# Patient Record
Sex: FEMALE | Race: WHITE | NOT HISPANIC OR LATINO | Employment: FULL TIME | URBAN - METROPOLITAN AREA
[De-identification: names, ages, dates, MRNs, and addresses within clinical notes are randomized per-mention and may not be internally consistent; named-entity substitution may affect disease eponyms.]

---

## 2017-08-18 ENCOUNTER — OFFICE VISIT (OUTPATIENT)
Dept: FAMILY MEDICINE | Facility: CLINIC | Age: 32
End: 2017-08-18
Payer: COMMERCIAL

## 2017-08-18 VITALS
TEMPERATURE: 98.4 F | BODY MASS INDEX: 31.65 KG/M2 | WEIGHT: 190 LBS | SYSTOLIC BLOOD PRESSURE: 120 MMHG | DIASTOLIC BLOOD PRESSURE: 70 MMHG | HEART RATE: 106 BPM | HEIGHT: 65 IN | OXYGEN SATURATION: 100 % | RESPIRATION RATE: 16 BRPM

## 2017-08-18 DIAGNOSIS — G89.4 CHRONIC PAIN SYNDROME: Primary | ICD-10-CM

## 2017-08-18 LAB
CRP SERPL-MCNC: <2.9 MG/L (ref 0–8)
ERYTHROCYTE [SEDIMENTATION RATE] IN BLOOD BY WESTERGREN METHOD: 9 MM/H (ref 0–20)

## 2017-08-18 PROCEDURE — 86618 LYME DISEASE ANTIBODY: CPT | Performed by: PHYSICIAN ASSISTANT

## 2017-08-18 PROCEDURE — 36415 COLL VENOUS BLD VENIPUNCTURE: CPT | Performed by: PHYSICIAN ASSISTANT

## 2017-08-18 PROCEDURE — 86140 C-REACTIVE PROTEIN: CPT | Performed by: PHYSICIAN ASSISTANT

## 2017-08-18 PROCEDURE — 85652 RBC SED RATE AUTOMATED: CPT | Performed by: PHYSICIAN ASSISTANT

## 2017-08-18 PROCEDURE — 99214 OFFICE O/P EST MOD 30 MIN: CPT | Performed by: PHYSICIAN ASSISTANT

## 2017-08-18 PROCEDURE — 86038 ANTINUCLEAR ANTIBODIES: CPT | Performed by: PHYSICIAN ASSISTANT

## 2017-08-18 PROCEDURE — 82306 VITAMIN D 25 HYDROXY: CPT | Performed by: PHYSICIAN ASSISTANT

## 2017-08-18 RX ORDER — DEXTROAMPHETAMINE SACCHARATE, AMPHETAMINE ASPARTATE, DEXTROAMPHETAMINE SULFATE AND AMPHETAMINE SULFATE 5; 5; 5; 5 MG/1; MG/1; MG/1; MG/1
20 TABLET ORAL 2 TIMES DAILY
Refills: 0 | COMMUNITY
Start: 2017-08-18 | End: 2020-07-27

## 2017-08-18 RX ORDER — DEXTROAMPHETAMINE SACCHARATE, AMPHETAMINE ASPARTATE MONOHYDRATE, DEXTROAMPHETAMINE SULFATE AND AMPHETAMINE SULFATE 5; 5; 5; 5 MG/1; MG/1; MG/1; MG/1
20 CAPSULE, EXTENDED RELEASE ORAL DAILY
Qty: 30 CAPSULE | Refills: 0 | COMMUNITY
Start: 2017-08-18 | End: 2019-04-22

## 2017-08-18 RX ORDER — CYCLOBENZAPRINE HCL 5 MG
5 TABLET ORAL 3 TIMES DAILY PRN
Qty: 30 TABLET | Refills: 1 | Status: SHIPPED | OUTPATIENT
Start: 2017-08-18 | End: 2018-02-05

## 2017-08-18 NOTE — PROGRESS NOTES
"  SUBJECTIVE:   Nel Durán is a 32 year old female who presents to clinic today for the following health issues:      Neck pain and multiple symptoms        Problem list and histories reviewed & adjusted, as indicated.  Additional history: 33 y/o new to me female here to discuss ome pain issues.  She has long term struggle with pain issues.  Started about 4 years ago with some LLQ and groin. She did end up having hysterectomy and further work up.  She did did work with pain clinic as well.  She states that she has had several other surgeries.  She last followed with GYN where her last surgery was for adhesions and had complications with some nerve damage.  She tried narcotics in the past, but she states she is hyper sensitive and they do not help.  Most of her pain symptoms tend to ebb and flow.  She has given gabapentin, but never took.  Lidocaine patch, no help.  Massage therapy.      She did work with GI and did have normal colonoscopy.      She has worked with chiropractors, PHYSICAL THERAPY, injection therapy with blocks.      When she gets flare, she feels very bloated.  But it does seem very random.    BP Readings from Last 3 Encounters:   08/18/17 120/70   05/19/15 118/82   01/20/15 125/84    Wt Readings from Last 3 Encounters:   08/18/17 190 lb (86.2 kg)   05/19/15 179 lb 11.2 oz (81.5 kg)   01/20/15 199 lb 11.2 oz (90.6 kg)                      Reviewed and updated as needed this visit by clinical staffTobacco  Allergies  Meds       Reviewed and updated as needed this visit by Provider         ROS:  Constitutional, HEENT, cardiovascular, pulmonary, gi and gu systems are negative, except as otherwise noted.      OBJECTIVE:   /70 (BP Location: Left arm, Cuff Size: Adult Regular)  Pulse 106  Temp 98.4  F (36.9  C) (Oral)  Resp 16  Ht 5' 5\" (1.651 m)  Wt 190 lb (86.2 kg)  LMP 03/09/2013  SpO2 100%  BMI 31.62 kg/m2  Body mass index is 31.62 kg/(m^2).  GENERAL: alert and no " distress  EYES: Eyes grossly normal to inspection  HENT: ear canals and TM's normal, nose and mouth without ulcers or lesions  NECK: no adenopathy, no asymmetry, masses, or scars and thyroid normal to palpation  RESP: lungs clear to auscultation - no rales, rhonchi or wheezes  CV: regular rate and rhythm, normal S1 S2, no S3 or S4, no murmur, click or rub, no peripheral edema and peripheral pulses strong  MS: no gross musculoskeletal defects noted, no edema  SKIN: no suspicious lesions or rashes  PSYCH: mentation appears normal, affect normal/bright    Diagnostic Test Results:  none     ASSESSMENT/PLAN:             1. Chronic pain syndrome  Complicated case from long history and unknown triggers.  Will trial low dose flexeril to see if helps symptoms.  Will start some inflammatory/auto immune work up.  We discussed some alternative approaches, and discussed elimination diet.  May trial gluten free.  - cyclobenzaprine (FLEXERIL) 5 MG tablet; Take 1 tablet (5 mg) by mouth 3 times daily as needed for muscle spasms  Dispense: 30 tablet; Refill: 1  - Antinuclear antibody screen by EIA  - ESR: Erythrocyte sedimentation rate  - CRP, inflammation  - Lyme Disease Aydee with reflex to WB Serum  - Vitamin D Deficiency        Jovanny Zayas PA-C  [facility]

## 2017-08-18 NOTE — MR AVS SNAPSHOT
"              After Visit Summary   8/18/2017    Nel Durán    MRN: [id number]           Patient Information     Date Of Birth          1985        Visit Information        Provider Department      8/18/2017 2:20 PM Jovanny Zayas PA-C [facility]        Today's Diagnoses     Chronic pain syndrome    -  1       Follow-ups after your visit        Who to contact     If you have questions or need follow up information about today's clinic visit or your schedule please contact [facility] directly at [phone].  Normal or non-critical lab and imaging results will be communicated to you by M[employer], letter or phone within 4 business days after the clinic has received the results. If you do not hear from us within 7 days, please contact the clinic through Mind[employer] or phone. If you have a critical or abnormal lab result, we will notify you by phone as soon as possible.  Submit refill requests through [employer] or call your pharmacy and they will forward the refill request to us. Please allow 3 business days for your refill to be completed.          Additional Information About Your Visit        MyChart Information     [employer] gives you secure access to your electronic health record. If you see a primary care provider, you can also send messages to your care team and make appointments. If you have questions, please call your primary care clinic.  If you do not have a primary care provider, please call [phone] and they will assist you.        Care EveryWhere ID     This is your Care EveryWhere ID. This could be used by other organizations to access your [facility] medical records  [id number]        Your Vitals Were     Pulse Temperature Respirations Height Last Period Pulse Oximetry    106 98.4  F (36.9  C) (Oral) 16 5' 5\" (1.651 m) 03/09/2013 100%    BMI (Body Mass Index)                   31.62 kg/m2            Blood Pressure from Last 3 Encounters:   08/18/17 120/70 "   05/19/15 118/82   01/20/15 125/84    Weight from Last 3 Encounters:   08/18/17 190 lb (86.2 kg)   05/19/15 179 lb 11.2 oz (81.5 kg)   01/20/15 199 lb 11.2 oz (90.6 kg)              We Performed the Following     Antinuclear antibody screen by EIA     CRP, inflammation     ESR: Erythrocyte sedimentation rate     Lyme Disease Aydee with reflex to WB Serum     Vitamin D Deficiency          Today's Medication Changes          These changes are accurate as of: 8/18/17  2:54 PM.  If you have any questions, ask your nurse or doctor.               Start taking these medicines.        Dose/Directions    cyclobenzaprine 5 MG tablet   Commonly known as:  FLEXERIL   Used for:  Chronic pain syndrome   Started by:  Jovanny Zayas PA-C        Dose:  5 mg   Take 1 tablet (5 mg) by mouth 3 times daily as needed for muscle spasms   Quantity:  30 tablet   Refills:  1            Where to get your medicines      These medications were sent to [facility] [address] [place] [address] & [address], [place] [address]     Phone:  [phone]     cyclobenzaprine 5 MG tablet                Primary Care Provider Office Phone # Fax #    Tammy Arzate [phone] [address] [address]        Equal Access to Services     CHARO RUEDA AH: Hadfederica kebedeo Somatthias, waaxda luqadaha, qaybta kaalmada heath, tian tanner . So [facility] [phone].    ATENCIÓN: Si habla español, tiene a maza disposición servicios gratuitos de asistencia lingüística. Nydia al [phone].    We comply with applicable federal civil rights laws and [place] laws. We do not discriminate on the basis of race, color, national origin, age, disability sex, sexual orientation or gender identity.            Thank you!     Thank you for choosing [facility]  for your care. Our goal is always to provide you with  excellent care. Hearing back from our patients is one way we can continue to improve our services. Please take a few minutes to complete the written survey that you may receive in the mail after your visit with us. Thank you!             Your Updated Medication List - Protect others around you: Learn how to safely use, store and throw away your medicines at www.disposemymeds.org.          This list is accurate as of: 8/18/17  2:54 PM.  Always use your most recent med list.                   Brand Name Dispense Instructions for use Diagnosis    * ADDERALL XR 20 MG per 24 hr capsule   Generic drug:  amphetamine-dextroamphetamine     30 capsule    Take 1 capsule (20 mg) by mouth daily        * ADDERALL 20 MG per tablet   Generic drug:  amphetamine-dextroamphetamine      Take 1 tablet (20 mg) by mouth 2 times daily        cetirizine 10 MG tablet    zyrTEC     Take 10 mg by mouth daily        cyclobenzaprine 5 MG tablet    FLEXERIL    30 tablet    Take 1 tablet (5 mg) by mouth 3 times daily as needed for muscle spasms    Chronic pain syndrome       MULTIVITAMINS PO      Take  by mouth daily.        * Notice:  This list has 2 medication(s) that are the same as other medications prescribed for you. Read the directions carefully, and ask your doctor or other care provider to review them with you.

## 2017-08-18 NOTE — NURSING NOTE
"Chief Complaint   Patient presents with     Neck Pain     initial /70 (BP Location: Left arm, Cuff Size: Adult Regular)  Pulse 106  Temp 98.4  F (36.9  C) (Oral)  Resp 16  Ht 5' 5\" (1.651 m)  Wt 190 lb (86.2 kg)  LMP 03/09/2013  SpO2 100%  BMI 31.62 kg/m2 Estimated body mass index is 31.62 kg/(m^2) as calculated from the following:    Height as of this encounter: 5' 5\" (1.651 m).    Weight as of this encounter: 190 lb (86.2 kg).  BP completed using cuff size: regular.  L  arm      Health Maintenance that is potentially due pending provider review:  NONE    n/a    Ulices Hernandez ma  "

## 2017-08-19 LAB — DEPRECATED CALCIDIOL+CALCIFEROL SERPL-MC: 34 UG/L (ref 20–75)

## 2017-08-21 LAB
ANA SER QL IA: <1
B BURGDOR IGG+IGM SER QL: 0.12 (ref 0–0.89)

## 2017-08-21 NOTE — PROGRESS NOTES
Dear Nel    Your test results are attached, feel free to contact me via Hashtagot     I have released your results so you can see as well.  Your vitamin D level is a little sub optimal, but I doubt this playing much role.  Have you started going gluten free today?    Cole Zayas PA-C

## 2017-08-27 ENCOUNTER — HEALTH MAINTENANCE LETTER (OUTPATIENT)
Age: 32
End: 2017-08-27

## 2018-02-05 ENCOUNTER — HOSPITAL ENCOUNTER (EMERGENCY)
Facility: CLINIC | Age: 33
Discharge: HOME | End: 2018-02-05
Attending: EMERGENCY MEDICINE | Admitting: EMERGENCY MEDICINE
Payer: COMMERCIAL

## 2018-02-05 ENCOUNTER — APPOINTMENT (OUTPATIENT)
Dept: ULTRASOUND IMAGING | Facility: CLINIC | Age: 33
End: 2018-02-05
Attending: EMERGENCY MEDICINE
Payer: COMMERCIAL

## 2018-02-05 ENCOUNTER — APPOINTMENT (OUTPATIENT)
Dept: CT IMAGING | Facility: CLINIC | Age: 33
End: 2018-02-05
Attending: EMERGENCY MEDICINE
Payer: COMMERCIAL

## 2018-02-05 VITALS
HEIGHT: 65 IN | DIASTOLIC BLOOD PRESSURE: 83 MMHG | SYSTOLIC BLOOD PRESSURE: 131 MMHG | OXYGEN SATURATION: 99 % | RESPIRATION RATE: 16 BRPM | BODY MASS INDEX: 29.16 KG/M2 | WEIGHT: 175 LBS | TEMPERATURE: 99.5 F

## 2018-02-05 DIAGNOSIS — N83.202 CYST OF LEFT OVARY: ICD-10-CM

## 2018-02-05 DIAGNOSIS — N28.9 RENAL LESION: ICD-10-CM

## 2018-02-05 DIAGNOSIS — J02.0 STREP THROAT: ICD-10-CM

## 2018-02-05 LAB
ALBUMIN SERPL-MCNC: 3.9 G/DL (ref 3.4–5)
ALBUMIN UR-MCNC: 30 MG/DL
ALP SERPL-CCNC: 46 U/L (ref 40–150)
ALT SERPL W P-5'-P-CCNC: 23 U/L (ref 0–50)
ANION GAP SERPL CALCULATED.3IONS-SCNC: 8 MMOL/L (ref 3–14)
APPEARANCE UR: CLEAR
AST SERPL W P-5'-P-CCNC: 16 U/L (ref 0–45)
BASOPHILS # BLD AUTO: 0 10E9/L (ref 0–0.2)
BASOPHILS NFR BLD AUTO: 0.2 %
BILIRUB SERPL-MCNC: 0.5 MG/DL (ref 0.2–1.3)
BILIRUB UR QL STRIP: NEGATIVE
BUN SERPL-MCNC: 8 MG/DL (ref 7–30)
CALCIUM SERPL-MCNC: 8.5 MG/DL (ref 8.5–10.1)
CHLORIDE SERPL-SCNC: 105 MMOL/L (ref 94–109)
CO2 SERPL-SCNC: 22 MMOL/L (ref 20–32)
COLOR UR AUTO: YELLOW
CREAT SERPL-MCNC: 0.71 MG/DL (ref 0.52–1.04)
DEPRECATED S PYO AG THROAT QL EIA: ABNORMAL
DIFFERENTIAL METHOD BLD: ABNORMAL
EOSINOPHIL # BLD AUTO: 0 10E9/L (ref 0–0.7)
EOSINOPHIL NFR BLD AUTO: 0 %
ERYTHROCYTE [DISTWIDTH] IN BLOOD BY AUTOMATED COUNT: 12 % (ref 10–15)
GFR SERPL CREATININE-BSD FRML MDRD: >90 ML/MIN/1.7M2
GLUCOSE SERPL-MCNC: 104 MG/DL (ref 70–99)
GLUCOSE UR STRIP-MCNC: NEGATIVE MG/DL
HCG UR QL: NEGATIVE
HCT VFR BLD AUTO: 39 % (ref 35–47)
HGB BLD-MCNC: 13.6 G/DL (ref 11.7–15.7)
HGB UR QL STRIP: ABNORMAL
IMM GRANULOCYTES # BLD: 0 10E9/L (ref 0–0.4)
IMM GRANULOCYTES NFR BLD: 0.3 %
KETONES UR STRIP-MCNC: 80 MG/DL
LEUKOCYTE ESTERASE UR QL STRIP: NEGATIVE
LYMPHOCYTES # BLD AUTO: 0.8 10E9/L (ref 0.8–5.3)
LYMPHOCYTES NFR BLD AUTO: 6.8 %
MCH RBC QN AUTO: 29.8 PG (ref 26.5–33)
MCHC RBC AUTO-ENTMCNC: 34.9 G/DL (ref 31.5–36.5)
MCV RBC AUTO: 86 FL (ref 78–100)
MONOCYTES # BLD AUTO: 0.7 10E9/L (ref 0–1.3)
MONOCYTES NFR BLD AUTO: 6.1 %
MUCOUS THREADS #/AREA URNS LPF: PRESENT /LPF
NEUTROPHILS # BLD AUTO: 10.1 10E9/L (ref 1.6–8.3)
NEUTROPHILS NFR BLD AUTO: 86.6 %
NITRATE UR QL: NEGATIVE
NRBC # BLD AUTO: 0 10*3/UL
NRBC BLD AUTO-RTO: 0 /100
PH UR STRIP: 5.5 PH (ref 5–7)
PLATELET # BLD AUTO: 122 10E9/L (ref 150–450)
POTASSIUM SERPL-SCNC: 3.5 MMOL/L (ref 3.4–5.3)
PROT SERPL-MCNC: 7.7 G/DL (ref 6.8–8.8)
RBC # BLD AUTO: 4.56 10E12/L (ref 3.8–5.2)
RBC #/AREA URNS AUTO: 1 /HPF (ref 0–2)
SODIUM SERPL-SCNC: 135 MMOL/L (ref 133–144)
SOURCE: ABNORMAL
SP GR UR STRIP: 1.02 (ref 1–1.03)
SPECIMEN SOURCE: ABNORMAL
SQUAMOUS #/AREA URNS AUTO: 2 /HPF (ref 0–1)
UROBILINOGEN UR STRIP-MCNC: NORMAL MG/DL (ref 0–2)
WBC # BLD AUTO: 11.7 10E9/L (ref 4–11)
WBC #/AREA URNS AUTO: 1 /HPF (ref 0–2)

## 2018-02-05 PROCEDURE — 81001 URINALYSIS AUTO W/SCOPE: CPT | Performed by: EMERGENCY MEDICINE

## 2018-02-05 PROCEDURE — 99285 EMERGENCY DEPT VISIT HI MDM: CPT | Mod: 25

## 2018-02-05 PROCEDURE — 96374 THER/PROPH/DIAG INJ IV PUSH: CPT | Mod: 59

## 2018-02-05 PROCEDURE — 93976 VASCULAR STUDY: CPT

## 2018-02-05 PROCEDURE — 85025 COMPLETE CBC W/AUTO DIFF WBC: CPT | Performed by: EMERGENCY MEDICINE

## 2018-02-05 PROCEDURE — 25000128 H RX IP 250 OP 636: Performed by: EMERGENCY MEDICINE

## 2018-02-05 PROCEDURE — 74177 CT ABD & PELVIS W/CONTRAST: CPT

## 2018-02-05 PROCEDURE — 80053 COMPREHEN METABOLIC PANEL: CPT | Performed by: EMERGENCY MEDICINE

## 2018-02-05 PROCEDURE — 25000125 ZZHC RX 250: Performed by: EMERGENCY MEDICINE

## 2018-02-05 PROCEDURE — 87880 STREP A ASSAY W/OPTIC: CPT | Performed by: EMERGENCY MEDICINE

## 2018-02-05 PROCEDURE — 81025 URINE PREGNANCY TEST: CPT | Performed by: EMERGENCY MEDICINE

## 2018-02-05 RX ORDER — IOPAMIDOL 755 MG/ML
88 INJECTION, SOLUTION INTRAVASCULAR ONCE
Status: COMPLETED | OUTPATIENT
Start: 2018-02-05 | End: 2018-02-05

## 2018-02-05 RX ORDER — KETOROLAC TROMETHAMINE 30 MG/ML
30 INJECTION, SOLUTION INTRAMUSCULAR; INTRAVENOUS ONCE
Status: COMPLETED | OUTPATIENT
Start: 2018-02-05 | End: 2018-02-05

## 2018-02-05 RX ORDER — AMOXICILLIN 500 MG/1
1000 CAPSULE ORAL 2 TIMES DAILY
Qty: 40 CAPSULE | Refills: 0 | Status: SHIPPED | OUTPATIENT
Start: 2018-02-05 | End: 2018-02-15

## 2018-02-05 RX ADMIN — SODIUM CHLORIDE 67 ML: 9 INJECTION, SOLUTION INTRAVENOUS at 13:22

## 2018-02-05 RX ADMIN — KETOROLAC TROMETHAMINE 30 MG: 30 INJECTION, SOLUTION INTRAMUSCULAR at 16:25

## 2018-02-05 RX ADMIN — IOPAMIDOL 88 ML: 755 INJECTION, SOLUTION INTRAVENOUS at 13:22

## 2018-02-05 ASSESSMENT — ENCOUNTER SYMPTOMS
SORE THROAT: 1
ABDOMINAL PAIN: 1
COUGH: 0
FEVER: 1
BLOOD IN STOOL: 1

## 2018-02-05 NOTE — ED PROVIDER NOTES
History     Chief Complaint:  Abdominal Pain       The history is provided by the patient.      Nel Durán is a 32 year old female with a history of chronic abdominal pain who presents to the ED for evaluation of abdominal pain. The patient has had LLQ abdominal pain and rectal bleeding since 02/01. In every bowel movement she has had since, she has noticed streaks of bright red blood in her stool and in the toilet. On 02/03, she developed a right sided sore throat with swelling and purulent drainage. Today, she noted an objective fever of 102F and decided to come in for evaluation.     Here in the ED, the patient denies having a cough or urinary changes. She reports a history of strep throat, a UTI in 2017, and colonoscopy from 3 years ago at the [facility]. Her colonoscopy was unremarkable, however, her pain decreased with a diet change.       Allergies:  No Known Drug Allergy    Medications:    The patient is currently on no regular medications.    Past Medical History:    ADD (attention deficit disorder)   ASCUS favor benign   depression   History of blood transfusion   Menarche   Pelvic pain     Past Surgical History:    Colonoscopy  Labial plasty  Hysterectomy supracervical  Lysis adhesions  Tubal ligation  Laparatomy exploratory    Family History:    Father: colon polyps  Mother: colon polyps    Social History:  Presents with her .  Negative for tobacco use.  Negative for alcohol use.  Marital Status:   [2]     Review of Systems   Constitutional: Positive for fever.   HENT: Positive for sore throat.    Respiratory: Negative for cough.    Gastrointestinal: Positive for abdominal pain and blood in stool.   All other systems reviewed and are negative.    Physical Exam   First Vitals:  Patient Vitals for the past 24 hrs:   BP Temp Temp src Heart Rate Resp SpO2 Height Weight   02/05/18 1630 131/83 - - - 16 99 % - -   02/05/18 1132 148/86 99.5  F (37.5  C) Oral 108 16 99 %  "1.651 m (5' 5\") 79.4 kg (175 lb)     Physical Exam  General: Resting on the gurney, appears uncomfortable  Head:  The scalp, face, and head appear normal  Mouth/Throat: Mucus membranes are moist. Slight tonsillar hydropathy, redness, and exudate of the posterior oral pharyxn, right worse than left.      No evidence of peritonsillar abscess or redness or swelling of the soft palat.   CV:  Regular rate    Normal S1 and S2  No pathological murmur   Resp:  Breath sounds clear and equal bilaterally    Non-labored, no retractions or accessory muscle use    No coarseness    No wheezing   GI:  Abdomen is soft, no rigidity    LLQ tenderness to palpation.    No guarding or rebound.    No CVA tenderness to percussion.  MS:  Normal motor assessment of all extremities.    Good capillary refill noted.  Skin:   No rash or lesions noted.  Neuro:   Speech is normal and fluent. No apparent deficit.  Psych: Awake. Alert.  Normal affect.      Appropriate interactions.    Emergency Department Course   Imaging:  Radiographic findings were communicated with the patient who voiced understanding of the findings.    CT Abdomen Pelvis with contrast:   IMPRESSION:   1. A left ovarian cystic lesion measures 3.1 cm. Pelvic ultrasound may  be helpful for further characterization.  2. Trace amount of nonspecific free fluid in the pelvis.  3.  Mild soft tissue density thickening involving the anterior  abdominal wall overlying the bladder just left of midline had a  similar appearance on the exam of 1/15/2015, and may represent  postoperative scarring. Please clinically correlate.  4. Indeterminate 1.6 cm hypoenhancing lesion in the lower pole of the  left kidney could represent a hemorrhagic or proteinaceous renal cyst,  however solid renal neoplasm cannot be excluded. Renal MRI should be  considered for further characterization.   As per radiology.    US Abdomen Pelvis Deplex Limited:   IMPRESSION:  1. Simple-appearing cyst in the left ovary " measuring 3 x 2.9 x 2.9 cm.  2. Status post hysterectomy. As per radiology.     Laboratory:  CBC: WBC: 11.7 (H), HGB: 13.6, PLT: 122 (L)  CMP: Glucose 104 (H), o/w WNL (Creatinine: 0.71)    UA with micro: blood trace (A), urineketon 80 (A), albumin 30 (A), squamous epithelial 2 (H), mucous present (A), o/w negative  HCG: Negative   Rapid strep: Positive     Interventions:  1625 Toradol 30 mg IV    Emergency Department Course:  Nursing notes and vitals reviewed. 1153 I performed an exam of the patient as documented above.     IV inserted. Medicine administered as documented above. Blood drawn. This was sent to the lab for further testing, results above.    The patient was sent for a CT Abdomen Pelvis and US Abdomen Pelvis Duplex while in the emergency department, findings above.     1447 I rechecked the patient and discussed the results of her workup thus far.     1619  I rechecked the patient and discussed the results of her workup thus far.  Findings and plan explained to the Patient. Patient discharged home with instructions regarding supportive care, medications, and reasons to return. The importance of close follow-up was reviewed. The patient was prescribed Amoxil.     I personally reviewed the laboratory results with the Patient and answered all related questions prior to discharge.     Impression & Plan    Medical Decision Making:  Nel Durán is a 32 year old female who presents with sore throat and clinical evidence of pharyngitis.  The rapid strep test is positive. There is no clinical evidence of  peritonsillar abscess, retropharyngeal abscess, Lemierre's Syndrome, epiglottis, or Rashawn's angina. The patient's symptoms are consistent with streptococcal pharyngitis.  I have recommended treatment with antibiotics and analgesics.     She additionally has LLQ pain pain.   A broad differential diagnosis was considered including colitis, appendicitis, intestinal cramping, pyelonephritis, UTI, kidney  stone, constipation, diverticulitis, volvulus, ileus, obstruction, pregnancy (ectopic or intrauterine), ovarian cyst (enlarged or ruptured), ovarian torsion, PID, etc as possibilities.   The workup in the ED shows likely ovarian cyst as source of pain. Doubt PID or TOA given clear findings of cyst without signs of abscess.  No other etiology for the patients pain is found at this point and my suspicion of an intraabdominal catastrophe or other worrisome etiology is very low. Patient is hemodynamically stable in ED.    Additionally, there was an incidental finding of a renal lesion. Patient was told to follow up with her primary care who will arrange a follow up with renal MRI.       Return for fevers greater than 102, increasing pain, other new symptoms develop.  Questions were answered.       Diagnosis:    ICD-10-CM    1. Strep throat J02.0    2. Cyst of left ovary N83.202    3. Renal lesion N28.9     -will need follow up with renal MRI - have your [facility] caredoctor arrange this       Disposition:  discharged to home    Discharge Medications:  New Prescriptions    AMOXICILLIN (AMOXIL) 500 MG CAPSULE    Take 2 capsules (1,000 mg) by mouth 2 times daily for 10 days     Rehana CUNHA, am serving as a scribe on 2/5/2018 at 11:53 AM to personally document services performed by Jessica Puckett MD based on my observations and the provider's statements to me.       Rehana Way  2/5/2018    EMERGENCY DEPARTMENT       Jessica Puckett MD  02/06/18 0218

## 2018-02-05 NOTE — DISCHARGE INSTRUCTIONS
Discharge Instructions  Sore Throat  You were seen today for a sore throat.  Most sore throats are caused by a virus. Antibiotics do not help with viral infections, but you can fight off the virus on your own.  In this case, your sore throat would be treated with medications for your pain and fever.    Strep throat is a kind of sore throat caused by Group A streptococcus bacteria.  This type of sore throat is treated with antibiotics.  If you had a rapid test done today for strep throat and it did not show infection, we always do a culture. If the culture shows you have strep throat, we will call you and get you a prescription for antibiotics.    Return to the Emergency Department if:    If you have difficulty breathing.    If you are drooling because you are unable to swallow.    You become dehydrated due to difficulty drinking. Signs of dehydration include weakness, dry mouth, and urinating less than 3 times per day.    If you develop swelling of the neck or tongue.    If you develop a high fever with either headache or stiff neck.    Treatment:      Pain relief -- Non-prescription pain medications, such as Tylenol  (acetaminophen) or Motrin , Advil  (ibuprofen) are usually recommended for pain.  Do not use a medicine that you are allergic to, or if your doctor has told you not to use it.   If you have been given a narcotic such as Vicodin  (hydrocodone with acetaminophen), Percocet  (oxycodone with acetaminophen), codeine, do not drive for four hours after you have taken it.  If the narcotic contains Tylenol  (acetaminophen), do not take Tylenol  with it. All narcotics will cause constipation, so eat a high fiber diet.      If you have been placed on antibiotics, watch for signs of allergic reaction.  These include rash, lip swelling, difficulty breathing, wheezing, and dizziness.  If you develop any of these symptoms, stop the antibiotic immediately and go to an Emergency Department or Urgent Care for  "evaluation.    Probiotics: If you have been given an antibiotic, you may want to also take a probiotic pill or eat yogurt with live cultures. Probiotics have \"good bacteria\" to help your intestines stay healthy. Studies have shown that probiotics help prevent diarrhea and other intestine problems (including C. diff infection) when you take antibiotics. You can buy these without a prescription in the pharmacy section of the store.   If you were given a prescription for medicine here today, be sure to read all of the information (including the package insert) that comes with your prescription.  This will include important information about the medicine, its side effects, and any warnings that you need to know about.  The pharmacist who fills the prescription can provide more information and answer questions you may have about the medicine.  If you have questions or concerns that the pharmacist cannot address, please call or return to the Emergency Department.           Opioid Medication Information    Pain medications are among the most commonly prescribed medicines, so we are including this information for all our patients. If you did not receive pain medication or get a prescription for pain medicine, you can ignore it.     You may have been given a prescription for an opioid (narcotic) pain medicine and/or have received a pain medicine while here in the Emergency Department. These medicines can make you drowsy or impaired. You must not drive, operate dangerous equipment, or engage in any other dangerous activities while taking these medications. If you drive while taking these medications, you could be arrested for DUI, or driving under the influence. Do not drink any alcohol while you are taking these medications.     Opioid pain medications can cause addiction. If you have a history of chemical dependency of any type, you are at a higher risk of becoming addicted to pain medications.  Only take these prescribed " medications to treat your pain when all other options have been tried. Take it for as short a time and as few doses as possible. Store your pain pills in a secure place, as they are frequently stolen and provide a dangerous opportunity for children or visitors in your house to start abusing these powerful medications. We will not replace any lost or stolen medicine.  As soon as your pain is better, you should flush all your remaining medication.     Many prescription pain medications contain Tylenol  (acetaminophen), including Vicodin , Tylenol #3 , Norco , Lortab , and Percocet .  You should not take any extra pills of Tylenol  if you are using these prescription medications or you can get very sick.  Do not ever take more than 3000 mg of acetaminophen in any 24 hour period.    All opioids tend to cause constipation. Drink plenty of water and eat foods that have a lot of fiber, such as fruits, vegetables, prune juice, apple juice and high fiber cereal.  Take a laxative if you don t move your bowels at least every other day. Miralax , Milk of Magnesia, Colace , or Senna  can be used to keep you regular.      Remember that you can always come back to the Emergency Department if you are not able to see your regular doctor in the amount of time listed above, if you get any new symptoms, or if there is anything that worries you.    Discharge Instructions  Ovarian Cyst    Abdominal pain can be caused by many things. Your doctor today has found that you have a cyst on the ovary, which appears to be the cause of your pain. Women in their reproductive years form cysts every month, but only cause pain if they are very large, or if they rupture and release blood or fluid. Fortunately, they rarely require surgery or hospitalization. The pain from a ruptured cyst usually gets gradually better, and should be much better within a few days. If there is a large cyst, it will usually go away within 1-2 months, but needs to be  watched to be sure it does go away, since sometimes a large cyst can become a cancer. There can be complications of a cyst, or other problems that cannot be found right away, so it is very important that you follow up as directed.      Return to the Emergency Department for a recheck if your pain gets worse, changes in location, or feels different.    Return to the Emergency Department right away if:    You get an oral temperature above 102oF or as directed by your doctor.    You have blood in your stools (bright red or black, tarry stools), or in your vomit.    You keep throwing up or can t drink liquids.    You can t have a bowel movement or you can t pass gas.    You faint, or feel very weak.    You have bloody, frequent or painful urination.    You have new symptoms or anything that worries you.    What can I do to help myself?    Take any medication prescribed by your doctor.    You may use Tylenol  (acetaminophen) or Advil , Motrin  (ibuprofen) for pain. Be sure to read and follow the package directions, and ask your doctor if you have questions.    Narcotic pain pills. If you have been given a narcotic such as Vicodin  (hydrocodone with acetaminophen), Percocet  (oxycodone with acetaminophen), codeine, do not drive for four hours after you have taken it.  If the narcotic contains Tylenol  (acetaminophen), do not take Tylenol  with it. All narcotics will cause constipation, so eat a high fiber diet.      Avoid sex for several days, because it will probably be painful.    Follow-up:      See your doctor within 2-3 days for a re-check.    If you were given a prescription for medicine here today, be sure to read all of the information (including the package insert) that comes with your prescription.  This will include important information about the medicine, its side effects, and any warnings that you need to know about.  The pharmacist who fills the prescription can provide more information and answer questions  you may have about the medicine.  If you have questions or concerns that the pharmacist cannot address, please call or return to the Emergency Department.         Opioid Medication Information    Pain medications are among the most commonly prescribed medicines, so we are including this information for all our patients. If you did not receive pain medication or get a prescription for pain medicine, you can ignore it.     You may have been given a prescription for an opioid (narcotic) pain medicine and/or have received a pain medicine while here in the Emergency Department. These medicines can make you drowsy or impaired. You must not drive, operate dangerous equipment, or engage in any other dangerous activities while taking these medications. If you drive while taking these medications, you could be arrested for DUI, or driving under the influence. Do not drink any alcohol while you are taking these medications.     Opioid pain medications can cause addiction. If you have a history of chemical dependency of any type, you are at a higher risk of becoming addicted to pain medications.  Only take these prescribed medications to treat your pain when all other options have been tried. Take it for as short a time and as few doses as possible. Store your pain pills in a secure place, as they are frequently stolen and provide a dangerous opportunity for children or visitors in your house to start abusing these powerful medications. We will not replace any lost or stolen medicine.  As soon as your pain is better, you should flush all your remaining medication.     Many prescription pain medications contain Tylenol  (acetaminophen), including Vicodin , Tylenol #3 , Norco , Lortab , and Percocet .  You should not take any extra pills of Tylenol  if you are using these prescription medications or you can get very sick.  Do not ever take more than 3000 mg of acetaminophen in any 24 hour period.    All opioids tend to cause  constipation. Drink plenty of water and eat foods that have a lot of fiber, such as fruits, vegetables, prune juice, apple juice and high fiber cereal.  Take a laxative if you don t move your bowels at least every other day. Miralax , Milk of Magnesia, Colace , or Senna  can be used to keep you regular.      Remember that you can always come back to the Emergency Department if you are not able to see your regular doctor in the amount of time listed above, if you get any new symptoms, or if there is anything that worries you.    Discharge Instructions  Incidental Findings    An incidental finding is something unexpected that was found while you were being treated today.  While this finding is not an emergency, you will want to follow up with your primary doctor to determine if anything should be done about it.    These findings can come from:    Checking your vital signs (example: high blood pressure).    Taking your history (example: unexplained weight loss).    The physical exam (example: a heart murmur).    Laboratory study (example: anemia).    X-rays/ultrasound/CT or other imaging (example: an unexplained mass).    Return to the Emergency Department if:    Your condition worsens.    You develop unexpected pain.    You now develop new symptoms or have new concerns.    Follow up with your doctor:    Follow up within the next week to discuss the results of all of your tests and about the main reason for your visit today.    Inform your doctor of what testing you had done today. You may want to obtain copies of your results from the medical records department.    Modern medical technology has become very sensitive.  Unexpected or incidental findings are very common and do not always indicate a problem.  However, sometimes problems are found very early before symptoms have even started. While these findings are not an emergency, this may allow your primary care doctor to start the earliest treatment possible.  Sometimes these incidental findings are not discovered until later when the final report is reported or when your primary care doctor compares our finding to your previous studies. Therefore, it is important for you to always review all results and studies with your primary care doctor or clinic after an Emergency Department visit.  If you were given a prescription for medicine here today, be sure to read all of the information (including the package insert) that comes with your prescription.  This will include important information about the medicine, its side effects, and any warnings that you need to know about.  The pharmacist who fills the prescription can provide more information and answer questions you may have about the medicine.  If you have questions or concerns that the pharmacist cannot address, please call or return to the Emergency Department.   Remember that you can always come back to the Emergency Department if you are not able to see your regular doctor in the amount of time listed above, if you get any new symptoms, or if there is anything that worries you.      You need to follow up with your primary to have your kidney reevaluated

## 2018-02-05 NOTE — ED AVS SNAPSHOT
Emergency Department    [address] [facility] [address]    Phone:  [phone]    Fax:  [phone]                                       Nel Durán   MRN: [id number]    Department:   Emergency Department   Date of Visit:  2/5/2018           After Visit Summary Signature Page     I have received my discharge instructions, and my questions have been answered. I have discussed any challenges I see with this plan with the nurse or doctor.    ..........................................................................................................................................  Patient/Patient Representative Signature      ..........................................................................................................................................  Patient Representative Print Name and Relationship to Patient    ..................................................               ................................................  Date                                            Time    ..........................................................................................................................................  Reviewed by Signature/Title    ...................................................              ..............................................  Date                                                            Time

## 2018-02-05 NOTE — ED AVS SNAPSHOT
Emergency Department    [address] [facility] [address]    Phone:  [phone]    Fax:  [phone]                                       Nel Durán   MRN: [id number]    Department:   Emergency Department   Date of Visit:  2/5/2018           Patient Information     Date Of Birth          1985        Your diagnoses for this visit were:     Strep throat     Cyst of left ovary     Renal lesion -will need follow up with renal MRI - have your priamry caredoctor arrange this       You were seen by Jessica Puckett MD.      Follow-up Information     Follow up with [facility]. Schedule an appointment as soon as possible for a visit in 2 days.    Contact information:    [address], [address]  [facility] [address] [phone]          Follow up with  Emergency Department.    Specialty:  EMERGENCY MEDICINE    Why:  As needed, If symptoms worsen    Contact information:    [address] [facility] [address] [phone]        Discharge Instructions       Discharge Instructions  Sore Throat  You were seen today for a sore throat.  Most sore throats are caused by a virus. Antibiotics do not help with viral infections, but you can fight off the virus on your own.  In this case, your sore throat would be treated with medications for your pain and fever.    Strep throat is a kind of sore throat caused by Group A streptococcus bacteria.  This type of sore throat is treated with antibiotics.  If you had a rapid test done today for strep throat and it did not show infection, we always do a culture. If the culture shows you have strep throat, we will call you and get you a prescription for antibiotics.    Return to the Emergency Department if:    If you have difficulty breathing.    If you are drooling because you are unable to swallow.    You become dehydrated due to difficulty drinking. Signs of dehydration include weakness, dry mouth, and urinating less  "than 3 times per day.    If you develop swelling of the neck or tongue.    If you develop a high fever with either headache or stiff neck.    Treatment:      Pain relief -- Non-prescription pain medications, such as Tylenol  (acetaminophen) or Motrin , Advil  (ibuprofen) are usually recommended for pain.  Do not use a medicine that you are allergic to, or if your doctor has told you not to use it.   If you have been given a narcotic such as Vicodin  (hydrocodone with acetaminophen), Percocet  (oxycodone with acetaminophen), codeine, do not drive for four hours after you have taken it.  If the narcotic contains Tylenol  (acetaminophen), do not take Tylenol  with it. All narcotics will cause constipation, so eat a high fiber diet.      If you have been placed on antibiotics, watch for signs of allergic reaction.  These include rash, lip swelling, difficulty breathing, wheezing, and dizziness.  If you develop any of these symptoms, stop the antibiotic immediately and go to an Emergency Department or Urgent Care for evaluation.    Probiotics: If you have been given an antibiotic, you may want to also take a probiotic pill or eat yogurt with live cultures. Probiotics have \"good bacteria\" to help your intestines stay healthy. Studies have shown that probiotics help prevent diarrhea and other intestine problems (including C. diff infection) when you take antibiotics. You can buy these without a prescription in the pharmacy section of the store.   If you were given a prescription for medicine here today, be sure to read all of the information (including the package insert) that comes with your prescription.  This will include important information about the medicine, its side effects, and any warnings that you need to know about.  The pharmacist who fills the prescription can provide more information and answer questions you may have about the medicine.  If you have questions or concerns that the pharmacist cannot address, " please call or return to the Emergency Department.           Opioid Medication Information    Pain medications are among the most commonly prescribed medicines, so we are including this information for all our patients. If you did not receive pain medication or get a prescription for pain medicine, you can ignore it.     You may have been given a prescription for an opioid (narcotic) pain medicine and/or have received a pain medicine while here in the Emergency Department. These medicines can make you drowsy or impaired. You must not drive, operate dangerous equipment, or engage in any other dangerous activities while taking these medications. If you drive while taking these medications, you could be arrested for DUI, or driving under the influence. Do not drink any alcohol while you are taking these medications.     Opioid pain medications can cause addiction. If you have a history of chemical dependency of any type, you are at a higher risk of becoming addicted to pain medications.  Only take these prescribed medications to treat your pain when all other options have been tried. Take it for as short a time and as few doses as possible. Store your pain pills in a secure place, as they are frequently stolen and provide a dangerous opportunity for children or visitors in your house to start abusing these powerful medications. We will not replace any lost or stolen medicine.  As soon as your pain is better, you should flush all your remaining medication.     Many prescription pain medications contain Tylenol  (acetaminophen), including Vicodin , Tylenol #3 , Norco , Lortab , and Percocet .  You should not take any extra pills of Tylenol  if you are using these prescription medications or you can get very sick.  Do not ever take more than 3000 mg of acetaminophen in any 24 hour period.    All opioids tend to cause constipation. Drink plenty of water and eat foods that have a lot of fiber, such as fruits, vegetables,  prune juice, apple juice and high fiber cereal.  Take a laxative if you don t move your bowels at least every other day. Miralax , Milk of Magnesia, Colace , or Senna  can be used to keep you regular.      Remember that you can always come back to the Emergency Department if you are not able to see your regular doctor in the amount of time listed above, if you get any new symptoms, or if there is anything that worries you.    Discharge Instructions  Ovarian Cyst    Abdominal pain can be caused by many things. Your doctor today has found that you have a cyst on the ovary, which appears to be the cause of your pain. Women in their reproductive years form cysts every month, but only cause pain if they are very large, or if they rupture and release blood or fluid. Fortunately, they rarely require surgery or hospitalization. The pain from a ruptured cyst usually gets gradually better, and should be much better within a few days. If there is a large cyst, it will usually go away within 1-2 months, but needs to be watched to be sure it does go away, since sometimes a large cyst can become a cancer. There can be complications of a cyst, or other problems that cannot be found right away, so it is very important that you follow up as directed.      Return to the Emergency Department for a recheck if your pain gets worse, changes in location, or feels different.    Return to the Emergency Department right away if:    You get an oral temperature above 102oF or as directed by your doctor.    You have blood in your stools (bright red or black, tarry stools), or in your vomit.    You keep throwing up or can t drink liquids.    You can t have a bowel movement or you can t pass gas.    You faint, or feel very weak.    You have bloody, frequent or painful urination.    You have new symptoms or anything that worries you.    What can I do to help myself?    Take any medication prescribed by your doctor.    You may use Tylenol   (acetaminophen) or Advil , Motrin  (ibuprofen) for pain. Be sure to read and follow the package directions, and ask your doctor if you have questions.    Narcotic pain pills. If you have been given a narcotic such as Vicodin  (hydrocodone with acetaminophen), Percocet  (oxycodone with acetaminophen), codeine, do not drive for four hours after you have taken it.  If the narcotic contains Tylenol  (acetaminophen), do not take Tylenol  with it. All narcotics will cause constipation, so eat a high fiber diet.      Avoid sex for several days, because it will probably be painful.    Follow-up:      See your doctor within 2-3 days for a re-check.    If you were given a prescription for medicine here today, be sure to read all of the information (including the package insert) that comes with your prescription.  This will include important information about the medicine, its side effects, and any warnings that you need to know about.  The pharmacist who fills the prescription can provide more information and answer questions you may have about the medicine.  If you have questions or concerns that the pharmacist cannot address, please call or return to the Emergency Department.         Opioid Medication Information    Pain medications are among the most commonly prescribed medicines, so we are including this information for all our patients. If you did not receive pain medication or get a prescription for pain medicine, you can ignore it.     You may have been given a prescription for an opioid (narcotic) pain medicine and/or have received a pain medicine while here in the Emergency Department. These medicines can make you drowsy or impaired. You must not drive, operate dangerous equipment, or engage in any other dangerous activities while taking these medications. If you drive while taking these medications, you could be arrested for DUI, or driving under the influence. Do not drink any alcohol while you are taking these  medications.     Opioid pain medications can cause addiction. If you have a history of chemical dependency of any type, you are at a higher risk of becoming addicted to pain medications.  Only take these prescribed medications to treat your pain when all other options have been tried. Take it for as short a time and as few doses as possible. Store your pain pills in a secure place, as they are frequently stolen and provide a dangerous opportunity for children or visitors in your house to start abusing these powerful medications. We will not replace any lost or stolen medicine.  As soon as your pain is better, you should flush all your remaining medication.     Many prescription pain medications contain Tylenol  (acetaminophen), including Vicodin , Tylenol #3 , Norco , Lortab , and Percocet .  You should not take any extra pills of Tylenol  if you are using these prescription medications or you can get very sick.  Do not ever take more than 3000 mg of acetaminophen in any 24 hour period.    All opioids tend to cause constipation. Drink plenty of water and eat foods that have a lot of fiber, such as fruits, vegetables, prune juice, apple juice and high fiber cereal.  Take a laxative if you don t move your bowels at least every other day. Miralax , Milk of Magnesia, Colace , or Senna  can be used to keep you regular.      Remember that you can always come back to the Emergency Department if you are not able to see your regular doctor in the amount of time listed above, if you get any new symptoms, or if there is anything that worries you.    Discharge Instructions  Incidental Findings    An incidental finding is something unexpected that was found while you were being treated today.  While this finding is not an emergency, you will want to follow up with your primary doctor to determine if anything should be done about it.    These findings can come from:    Checking your vital signs (example: high blood  pressure).    Taking your history (example: unexplained weight loss).    The physical exam (example: a heart murmur).    Laboratory study (example: anemia).    X-rays/ultrasound/CT or other imaging (example: an unexplained mass).    Return to the Emergency Department if:    Your condition worsens.    You develop unexpected pain.    You now develop new symptoms or have new concerns.    Follow up with your doctor:    Follow up within the next week to discuss the results of all of your tests and about the main reason for your visit today.    Inform your doctor of what testing you had done today. You may want to obtain copies of your results from the medical records department.    Modern medical technology has become very sensitive.  Unexpected or incidental findings are very common and do not always indicate a problem.  However, sometimes problems are found very early before symptoms have even started. While these findings are not an emergency, this may allow your primary care doctor to start the earliest treatment possible. Sometimes these incidental findings are not discovered until later when the final report is reported or when your primary care doctor compares our finding to your previous studies. Therefore, it is important for you to always review all results and studies with your primary care doctor or clinic after an Emergency Department visit.  If you were given a prescription for medicine here today, be sure to read all of the information (including the package insert) that comes with your prescription.  This will include important information about the medicine, its side effects, and any warnings that you need to know about.  The pharmacist who fills the prescription can provide more information and answer questions you may have about the medicine.  If you have questions or concerns that the pharmacist cannot address, please call or return to the Emergency Department.   Remember that you can always come back  to the Emergency Department if you are not able to see your regular doctor in the amount of time listed above, if you get any new symptoms, or if there is anything that worries you.      You need to follow up with your primary to have your kidney reevaluated    Future Appointments        Provider Department Dept Phone Center    2/8/2018 8:20 AM Jovanny Zayas PA-C [facility] [phone] UP      24 Hour Appointment Hotline       To make an appointment at any [facility], call [phone] ([phone]). If you don't have a family doctor or clinic, we will help you find one. [facility] are conveniently located to serve the needs of you and your family.             Review of your medicines      START taking        Dose / Directions Last dose taken    amoxicillin 500 MG capsule   Commonly known as:  AMOXIL   Dose:  1000 mg   Quantity:  40 capsule        Take 2 capsules (1,000 mg) by mouth 2 times daily for 10 days   Refills:  0          Our records show that you are taking the medicines listed below. If these are incorrect, please call your family doctor or clinic.        Dose / Directions Last dose taken    * ADDERALL XR 20 MG per 24 hr capsule   Dose:  20 mg   Quantity:  30 capsule   Generic drug:  amphetamine-dextroamphetamine        Take 1 capsule (20 mg) by mouth daily   Refills:  0        * ADDERALL 20 MG per tablet   Dose:  20 mg   Generic drug:  amphetamine-dextroamphetamine        Take 1 tablet (20 mg) by mouth 2 times daily   Refills:  0        B COMPLEX + C TR PO        Refills:  0        FISH OIL PO        Refills:  0        * Notice:  This list has 2 medication(s) that are the same as other medications prescribed for you. Read the directions carefully, and ask your doctor or other care provider to review them with you.            Prescriptions were sent or printed at these locations (1 Prescription)                   Other Prescriptions                Printed at  Department/Unit printer (1 of 1)         amoxicillin (AMOXIL) 500 MG capsule                Procedures and tests performed during your visit     CBC with platelets differential    CT Abdomen Pelvis w Contrast    Comprehensive metabolic panel    Give 20 ounces of water 15 minutes before CT of abdomen    HCG qualitative urine    Rapid strep screen    UA with Microscopic reflex to Culture    US Abdomen Pelvis Duplex Limited      Orders Needing Specimen Collection     None      Pending Results     Date and Time Order Name Status Description    2/5/2018 1406 US Abdomen Pelvis Duplex Limited Preliminary             Pending Culture Results     No orders found from 2/3/2018 to 2/6/2018.            Pending Results Instructions     If you had any lab results that were not finalized at the time of your Discharge, you can call the ED Lab Result RN at [phone]. You will be contacted by this team for any positive Lab results or changes in treatment. The nurses are available 7 days a week from 10A to 6:30P.  You can leave a message 24 hours per day and they will return your call.        Test Results From Your Hospital Stay        2/5/2018 12:40 PM      Component Results     Component Value Ref Range & Units Status    WBC 11.7 (H) 4.0 - 11.0 10e9/L Final    RBC Count 4.56 3.8 - 5.2 10e12/L Final    Hemoglobin 13.6 11.7 - 15.7 g/dL Final    Hematocrit 39.0 35.0 - 47.0 % Final    MCV 86 78 - 100 fl Final    MCH 29.8 26.5 - 33.0 pg Final    MCHC 34.9 31.5 - 36.5 g/dL Final    RDW 12.0 10.0 - 15.0 % Final    Platelet Count 122 (L) 150 - 450 10e9/L Final    Diff Method Automated Method  Final    % Neutrophils 86.6 % Final    % Lymphocytes 6.8 % Final    % Monocytes 6.1 % Final    % Eosinophils 0.0 % Final    % Basophils 0.2 % Final    % Immature Granulocytes 0.3 % Final    Nucleated RBCs 0 0 /100 Final    Absolute Neutrophil 10.1 (H) 1.6 - 8.3 10e9/L Final    Absolute Lymphocytes 0.8 0.8 - 5.3 10e9/L Final    Absolute Monocytes 0.7  0.0 - 1.3 10e9/L Final    Absolute Eosinophils 0.0 0.0 - 0.7 10e9/L Final    Absolute Basophils 0.0 0.0 - 0.2 10e9/L Final    Abs Immature Granulocytes 0.0 0 - 0.4 10e9/L Final    Absolute Nucleated RBC 0.0  Final         2/5/2018 12:54 PM      Component Results     Component Value Ref Range & Units Status    Sodium 135 133 - 144 mmol/L Final    Potassium 3.5 3.4 - 5.3 mmol/L Final    Chloride 105 94 - 109 mmol/L Final    Carbon Dioxide 22 20 - 32 mmol/L Final    Anion Gap 8 3 - 14 mmol/L Final    Glucose 104 (H) 70 - 99 mg/dL Final    Urea Nitrogen 8 7 - 30 mg/dL Final    Creatinine 0.71 0.52 - 1.04 mg/dL Final    GFR Estimate >90 >60 mL/min/1.7m2 Final    Non  GFR Calc    GFR Estimate If Black >90 >60 mL/min/1.7m2 Final    African American GFR Calc    Calcium 8.5 8.5 - 10.1 mg/dL Final    Bilirubin Total 0.5 0.2 - 1.3 mg/dL Final    Albumin 3.9 3.4 - 5.0 g/dL Final    Protein Total 7.7 6.8 - 8.8 g/dL Final    Alkaline Phosphatase 46 40 - 150 U/L Final    ALT 23 0 - 50 U/L Final    AST 16 0 - 45 U/L Final         2/5/2018 12:49 PM      Component Results     Component Value Ref Range & Units Status    Color Urine Yellow  Final    Appearance Urine Clear  Final    Glucose Urine Negative NEG^Negative mg/dL Final    Bilirubin Urine Negative NEG^Negative Final    Ketones Urine 80 (A) NEG^Negative mg/dL Final    Specific Gravity Urine 1.022 1.003 - 1.035 Final    Blood Urine Trace (A) NEG^Negative Final    pH Urine 5.5 5.0 - 7.0 pH Final    Protein Albumin Urine 30 (A) NEG^Negative mg/dL Final    Urobilinogen mg/dL Normal 0.0 - 2.0 mg/dL Final    Nitrite Urine Negative NEG^Negative Final    Leukocyte Esterase Urine Negative NEG^Negative Final    Source Midstream Urine  Final    WBC Urine 1 0 - 2 /HPF Final    RBC Urine 1 0 - 2 /HPF Final    Squamous Epithelial /HPF Urine 2 (H) 0 - 1 /HPF Final    Mucous Urine Present (A) NEG^Negative /LPF Final         2/5/2018 12:42 PM      Component Results     Component  Value Ref Range & Units Status    HCG Qual Urine Negative NEG^Negative Final    This test is for screening purposes.  Results should be interpreted along with   the clinical picture.  Confirmation testing is available if warranted by   ordering [id number], HCG Quantitative Pregnancy.           2/5/2018 12:51 PM      Component Results     Component    Specimen Description    Throat    Rapid Strep A Screen (Abnormal)    POSITIVE: Group A Streptococcal antigen detected by immunoassay.         2/5/2018  2:48 PM      Narrative     CT ABDOMEN AND PELVIS WITH CONTRAST   2/5/2018 1:25 PM     HISTORY: Left lower quadrant pain.    TECHNIQUE: 88 mL Isovue-370 IV were administered. After contrast  administration, volumetric helical sections were acquired from the  lung bases to the ischial tuberosities. Coronal images were also  reconstructed. Radiation dose for this scan was reduced using  automated exposure control, adjustment of the mA and/or kV according  to patient size, or iterative reconstruction technique.    COMPARISON: CT of the abdomen and pelvis performed 1/15/2015.     FINDINGS: No bowel obstruction. No evidence for colitis or  diverticulitis. The appendix is partially seen, and appears  unremarkable where visualized. The uterus is not visualized,  consistent with history of prior supracervical hysterectomy. A left  ovarian cystic lesion measures 3.1 cm (series 2 image 76). There is a  trace amount of nonspecific free fluid in the pelvis mild soft tissue  density thickening in the anterior abdominal wall overlying the pelvis  just left of midline (series 2 image 80) has a similar appearance to  the previous exam, and may represent postoperative scarring.  Indeterminate hypoenhancing lesion in the lower pole of the left  kidney posterolaterally (series 2 image 39) measures 1.6 cm. This  lesion was not visible on the previous examination. The liver,  gallbladder, spleen, adrenal glands, pancreas, and kidneys  are  otherwise unremarkable. No hydronephrosis.  The visualized lung bases  are clear.          Impression     IMPRESSION:   1. A left ovarian cystic lesion measures 3.1 cm. Pelvic ultrasound may  be helpful for further characterization.  2. Trace amount of nonspecific free fluid in the pelvis.  3.  Mild soft tissue density thickening involving the anterior  abdominal wall overlying the bladder just left of midline had a  similar appearance on the exam of 1/15/2015, and may represent  postoperative scarring. Please clinically correlate.  4. Indeterminate 1.6 cm hypoenhancing lesion in the lower pole of the  left kidney could represent a hemorrhagic or proteinaceous renal cyst,  however solid renal neoplasm cannot be excluded. Renal MRI should be  considered for further characterization.      JET MELISSA MD               2/5/2018  4:08 PM      Narrative     ULTRASOUND ABDOMEN OR PELVIS DOPPLER LIMITED  2/5/2018 3:03 PM    HISTORY:  32-year-old woman with pelvic pain.    COMPARISON: No previous ultrasound exam, though a CT exam was  reviewed, performed earlier the same day on February 5, 2018.    TECHNIQUE: Transabdominal images were obtained. Patient refused  transvaginal imaging. Patient had hysterectomy in 2013. CT exam  demonstrated cystic collection in/adjacent to left ovary.    FINDINGS: The uterus is not identified. The right ovary is 3.5 x 2.6 x  2.4 cm. The left ovary is 5.1 x 3.3 x 4.1 cm. Hypoechoic collection is  noted within the left ovary measuring 3 x 2.9 x 2.9 cm. No internal  blood flow. This collection appears relatively anechoic and without  septations. No free fluid identified. Arterial and venous blood flow  is visible and identified in both ovaries.        Impression     IMPRESSION:  1. Simple-appearing cyst in the left ovary measuring 3 x 2.9 x 2.9 cm.  2. Status post hysterectomy.                Clinical Quality Measure: Blood Pressure Screening     Your blood pressure was checked while  you were in the emergency department today. The last reading we obtained was  BP: 148/86 . Please read the guidelines below about what these numbers mean and what you should do about them.  If your systolic blood pressure (the top number) is less than 120 and your diastolic blood pressure (the bottom number) is less than 80, then your blood pressure is normal. There is nothing more that you need to do about it.  If your systolic blood pressure (the top number) is 120-139 or your diastolic blood pressure (the bottom number) is 80-89, your blood pressure may be higher than it should be. You should have your blood pressure rechecked within a year by a primary care provider.  If your systolic blood pressure (the top number) is 140 or greater or your diastolic blood pressure (the bottom number) is 90 or greater, you may have high blood pressure. High blood pressure is treatable, but if left untreated over time it can put you at risk for heart attack, stroke, or kidney failure. You should have your blood pressure rechecked by a primary care provider within the next 4 weeks.  If your provider in the emergency department today gave you specific instructions to follow-up with your doctor or provider even sooner than that, you should follow that instruction and not wait for up to 4 weeks for your follow-up visit.        Thank you for choosing [place]       Thank you for choosing [facility] for your care. Our goal is always to provide you with excellent care. Hearing back from our patients is one way we can continue to improve our services. Please take a few minutes to complete the written survey that you may receive in the mail after you visit with us. Thank you!        [employer]W [employer] Information     [employer] gives you secure access to your electronic health record. If you see a primary care provider, you can also send messages to your care team and make appointments. If you have questions, please call your primary care clinic.  If  you do not have a primary care provider, please call [phone] and they will assist you.        Care EveryWhere ID     This is your Care EveryWhere ID. This could be used by other organizations to access your [place] medical records  [id number]        Equal Access to Services     KARMEN RUEDA : Tessa Maxwell, colton chambers, ileana kaalrichy joe, tian nevarez. So [facility] [phone].    ATENCIÓN: Si habla español, tiene a maza disposición servicios gratuitos de asistencia lingüística. Llame al [phone].    We comply with applicable federal civil rights laws and [place] laws. We do not discriminate on the basis of race, color, national origin, age, disability, sex, sexual orientation, or gender identity.            After Visit Summary       This is your record. Keep this with you and show to your community pharmacist(s) and doctor(s) at your next visit.

## 2018-02-22 ENCOUNTER — OFFICE VISIT (OUTPATIENT)
Dept: FAMILY MEDICINE | Facility: CLINIC | Age: 33
End: 2018-02-22
Payer: COMMERCIAL

## 2018-02-22 VITALS
OXYGEN SATURATION: 99 % | RESPIRATION RATE: 16 BRPM | TEMPERATURE: 98.7 F | HEART RATE: 66 BPM | WEIGHT: 179 LBS | SYSTOLIC BLOOD PRESSURE: 124 MMHG | BODY MASS INDEX: 29.79 KG/M2 | DIASTOLIC BLOOD PRESSURE: 86 MMHG

## 2018-02-22 DIAGNOSIS — N28.9 RENAL LESION: Primary | ICD-10-CM

## 2018-02-22 DIAGNOSIS — N83.202 CYST OF LEFT OVARY: ICD-10-CM

## 2018-02-22 PROCEDURE — 99213 OFFICE O/P EST LOW 20 MIN: CPT | Performed by: PHYSICIAN ASSISTANT

## 2018-02-22 ASSESSMENT — ANXIETY QUESTIONNAIRES
7. FEELING AFRAID AS IF SOMETHING AWFUL MIGHT HAPPEN: NOT AT ALL
6. BECOMING EASILY ANNOYED OR IRRITABLE: SEVERAL DAYS
IF YOU CHECKED OFF ANY PROBLEMS ON THIS QUESTIONNAIRE, HOW DIFFICULT HAVE THESE PROBLEMS MADE IT FOR YOU TO DO YOUR WORK, TAKE CARE OF THINGS AT HOME, OR GET ALONG WITH OTHER PEOPLE: NOT DIFFICULT AT ALL
5. BEING SO RESTLESS THAT IT IS HARD TO SIT STILL: NOT AT ALL
2. NOT BEING ABLE TO STOP OR CONTROL WORRYING: NOT AT ALL
1. FEELING NERVOUS, ANXIOUS, OR ON EDGE: SEVERAL DAYS
GAD7 TOTAL SCORE: 3
3. WORRYING TOO MUCH ABOUT DIFFERENT THINGS: NOT AT ALL

## 2018-02-22 ASSESSMENT — PATIENT HEALTH QUESTIONNAIRE - PHQ9: 5. POOR APPETITE OR OVEREATING: SEVERAL DAYS

## 2018-02-22 NOTE — PROGRESS NOTES
SUBJECTIVE:   Nel Durán is a 32 year old female who presents to clinic today for the following health issues:      ED/UC Followup:     Facility:  [facility]   Date of visit: 2/5/2018  Reason for visit: Sore throat, abdominal pain, and rectal bleeding  Current Status: pt states that she feels fine             Problem list and histories reviewed & adjusted, as indicated.  Additional history: patient was recently in the ER on 2/5 secondary to lower abdomen pain that was assumed to be from ovarian cyst.  She has had these in the past, but this one was worse.  She was also was dx and treated for strep.  She is feeling better from both of those.  During her work up, a CT was performed, and a possible renal lesion was seen, which recommendation for MRI follow up.    BP Readings from Last 3 Encounters:   02/22/18 124/86   02/05/18 131/83   08/18/17 120/70    Wt Readings from Last 3 Encounters:   02/22/18 179 lb (81.2 kg)   02/05/18 175 lb (79.4 kg)   08/18/17 190 lb (86.2 kg)                    Reviewed and updated as needed this visit by clinical staff  Tobacco  Allergies  Meds  Med Hx  Surg Hx  Fam Hx  Soc Hx      Reviewed and updated as needed this visit by Provider         ROS:  Constitutional, HEENT, cardiovascular, pulmonary, gi and gu systems are negative, except as otherwise noted.    OBJECTIVE:     /86  Pulse 66  Temp 98.7  F (37.1  C) (Tympanic)  Resp 16  Wt 179 lb (81.2 kg)  LMP 03/09/2013  SpO2 99%  BMI 29.79 kg/m2  Body mass index is 29.79 kg/(m^2).  GENERAL: alert and no distress  EYES: Eyes grossly normal to inspection  RESP: lungs clear to auscultation - no rales, rhonchi or wheezes  CV: regular rate and rhythm, normal S1 S2, no S3 or S4, no murmur, click or rub, no peripheral edema and peripheral pulses strong  PSYCH: mentation appears normal, affect normal/bright    Diagnostic Test Results:  none     ASSESSMENT/PLAN:             1. Renal lesion  Will get follow up  image as recommended by radiology.    - MR Abdomen w/o & w Contrast; Future    2. Cyst of left ovary  improved          Jovanny Zayas PA-C  [facility]

## 2018-02-22 NOTE — MR AVS SNAPSHOT
After Visit Summary   2/22/2018    Nel Durán    MRN: [id number]           Patient Information     Date Of Birth          1985        Visit Information        Provider Department      2/22/2018 2:00 PM Jovanny Zayas PA-C [facility]        Today's Diagnoses     Renal lesion    -  1    Cyst of left ovary           Follow-ups after your visit        Future tests that were ordered for you today     Open Future Orders        Priority Expected Expires Ordered    MR Abdomen w/o & w Contrast Routine  2/22/2019 2/22/2018            Who to contact     If you have questions or need follow up information about today's clinic visit or your schedule please contact [facility] directly at [phone].  Normal or non-critical lab and imaging results will be communicated to you by Bro[employer]-e-gameshart, letter or phone within 4 business days after the clinic has received the results. If you do not hear from us within 7 days, please contact the clinic through Brocc[employer]-e-gameshart or phone. If you have a critical or abnormal lab result, we will notify you by phone as soon as possible.  Submit refill requests through iRezQ or call your pharmacy and they will forward the refill request to us. Please allow 3 business days for your refill to be completed.          Additional Information About Your Visit        MyChart Information     [employer] gives you secure access to your electronic health record. If you see a primary care provider, you can also send messages to your care team and make appointments. If you have questions, please call your primary care clinic.  If you do not have a primary care provider, please call [phone] and they will assist you.        Care EveryWhere ID     This is your Care EveryWhere ID. This could be used by other organizations to access your [place] medical records  [id number]        Your Vitals Were     Pulse Temperature Respirations Last Period Pulse Oximetry BMI  (Body Mass Index)    66 98.7  F (37.1  C) (Tympanic) 16 03/09/2013 99% 29.79 kg/m2       Blood Pressure from Last 3 Encounters:   02/22/18 124/86   02/05/18 131/83   08/18/17 120/70    Weight from Last 3 Encounters:   02/22/18 179 lb (81.2 kg)   02/05/18 175 lb (79.4 kg)   08/18/17 190 lb (86.2 kg)               Primary Care Provider Office Phone # Fax #    [facility] [phone] [address]  [place] [address]        Equal Access to Services     KARMEN RUEDA : Tessa Maxwell, wamarcie chambers, sarata kaalmada heath, tian tanner . So [facility] [phone].    ATENCIÓN: Si habla español, tiene a maza disposición servicios gratuitos de asistencia lingüística. Llame al [phone].    We comply with applicable federal civil rights laws and [place] laws. We do not discriminate on the basis of race, color, national origin, age, disability, sex, sexual orientation, or gender identity.            Thank you!     Thank you for choosing [facility]  for your care. Our goal is always to provide you with excellent care. Hearing back from our patients is one way we can continue to improve our services. Please take a few minutes to complete the written survey that you may receive in the mail after your visit with us. Thank you!             Your Updated Medication List - Protect others around you: Learn how to safely use, store and throw away your medicines at www.disposemymeds.org.          This list is accurate as of 2/22/18  2:21 PM.  Always use your most recent med list.                   Brand Name Dispense Instructions for use Diagnosis    * ADDERALL XR 20 MG per 24 hr capsule   Generic drug:  amphetamine-dextroamphetamine     30 capsule    Take 1 capsule (20 mg) by mouth daily        * ADDERALL 20 MG per tablet   Generic drug:  amphetamine-dextroamphetamine      Take 1 tablet (20 mg) by mouth 2 times daily        B COMPLEX  + C TR PO           FISH OIL PO           * Notice:  This list has 2 medication(s) that are the same as other medications prescribed for you. Read the directions carefully, and ask your doctor or other care provider to review them with you.

## 2018-02-22 NOTE — NURSING NOTE
"Chief Complaint   Patient presents with     Hospital F/U     2 weeks ago and would like to schedule an MRI      Initial /86  Pulse 66  Temp 98.7  F (37.1  C) (Tympanic)  Resp 16  Wt 179 lb (81.2 kg)  LMP 03/09/2013  SpO2 99%  BMI 29.79 kg/m2 Estimated body mass index is 29.79 kg/(m^2) as calculated from the following:    Height as of 2/5/18: 5' 5\" (1.651 m).    Weight as of this encounter: 179 lb (81.2 kg).  BP completed using cuff size: large.  R arm       Health Maintenance that is potentially due pending provider review:   Pap Smear, PHQ9 and GAD7    PHQ/CONI--Gave pt questionnare and pt stated that she will schedule her pap smear when she gets her new work schedule.        Olesya De Luna CMA     "

## 2018-02-23 ASSESSMENT — PATIENT HEALTH QUESTIONNAIRE - PHQ9: SUM OF ALL RESPONSES TO PHQ QUESTIONS 1-9: 3

## 2018-02-23 ASSESSMENT — ANXIETY QUESTIONNAIRES: GAD7 TOTAL SCORE: 3

## 2018-03-02 ENCOUNTER — HOSPITAL ENCOUNTER (OUTPATIENT)
Dept: MRI IMAGING | Facility: CLINIC | Age: 33
Discharge: HOME | End: 2018-03-02
Attending: PHYSICIAN ASSISTANT | Admitting: PHYSICIAN ASSISTANT
Payer: COMMERCIAL

## 2018-03-02 DIAGNOSIS — N28.9 RENAL LESION: ICD-10-CM

## 2018-03-02 PROCEDURE — 74183 MRI ABD W/O CNTR FLWD CNTR: CPT

## 2018-03-02 PROCEDURE — 25000128 H RX IP 250 OP 636: Performed by: PHYSICIAN ASSISTANT

## 2018-03-02 PROCEDURE — 27210995 ZZH RX 272: Performed by: PHYSICIAN ASSISTANT

## 2018-03-02 PROCEDURE — A9585 GADOBUTROL INJECTION: HCPCS | Performed by: PHYSICIAN ASSISTANT

## 2018-03-02 RX ORDER — ACYCLOVIR 200 MG/1
60 CAPSULE ORAL ONCE
Status: COMPLETED | OUTPATIENT
Start: 2018-03-02 | End: 2018-03-02

## 2018-03-02 RX ORDER — GADOBUTROL 604.72 MG/ML
8 INJECTION INTRAVENOUS ONCE
Status: COMPLETED | OUTPATIENT
Start: 2018-03-02 | End: 2018-03-02

## 2018-03-02 RX ADMIN — GADOBUTROL 8 ML: 604.72 INJECTION INTRAVENOUS at 11:18

## 2018-03-02 RX ADMIN — SODIUM CHLORIDE 60 ML: 9 INJECTION, SOLUTION INTRAMUSCULAR; INTRAVENOUS; SUBCUTANEOUS at 11:18

## 2018-03-08 ENCOUNTER — TELEPHONE (OUTPATIENT)
Dept: FAMILY MEDICINE | Facility: CLINIC | Age: 33
End: 2018-03-08

## 2018-03-08 DIAGNOSIS — N28.9 RENAL LESION: Primary | ICD-10-CM

## 2018-03-09 NOTE — TELEPHONE ENCOUNTER
Patient informed.  Verbalizes understanding  Gave her number for Urology to call and schedule appointment.  Rachelle Ceja RN

## 2018-03-09 NOTE — TELEPHONE ENCOUNTER
Please call patient with results.  The MRI is not giving us much more information that the CT did as far as the lesion.  They still are classifying this as indeterminate.  Instead of ordered further images at this time, I have placed a referral for patient to discuss further with specialist to determine the best step going forward.      Cole Zayas PA-C

## 2018-03-19 ASSESSMENT — ENCOUNTER SYMPTOMS
CONSTIPATION: 1
ABDOMINAL PAIN: 1
SPEECH CHANGE: 0
WEIGHT LOSS: 1
SWOLLEN GLANDS: 0
SLEEP DISTURBANCES DUE TO BREATHING: 0
JOINT SWELLING: 0
BLOOD IN STOOL: 0
HEMATURIA: 0
INSOMNIA: 1
HYPOTENSION: 0
DIARRHEA: 1
BOWEL INCONTINENCE: 0
BACK PAIN: 1
NAUSEA: 1
DIZZINESS: 1
INCREASED ENERGY: 1
PARALYSIS: 0
MYALGIAS: 1
HEADACHES: 1
BRUISES/BLEEDS EASILY: 1
VOMITING: 0
DECREASED APPETITE: 0
EXERCISE INTOLERANCE: 0
POLYPHAGIA: 0
HYPERTENSION: 0
LEG PAIN: 0
NECK PAIN: 1
HALLUCINATIONS: 0
MEMORY LOSS: 0
SYNCOPE: 0
CHILLS: 0
FATIGUE: 1
WEAKNESS: 0
DIFFICULTY URINATING: 0
TREMORS: 0
ALTERED TEMPERATURE REGULATION: 1
DISTURBANCES IN COORDINATION: 1
EYE WATERING: 1
HEARTBURN: 0
FEVER: 0
EYE REDNESS: 0
ORTHOPNEA: 0
SEIZURES: 0
STIFFNESS: 1
MUSCLE WEAKNESS: 0
PANIC: 0
NIGHT SWEATS: 0
NERVOUS/ANXIOUS: 0
DYSURIA: 1
MUSCLE CRAMPS: 0
NUMBNESS: 0
PALPITATIONS: 0
DEPRESSION: 0
RECTAL PAIN: 0
EYE PAIN: 0
EYE IRRITATION: 0
LIGHT-HEADEDNESS: 1
DOUBLE VISION: 0
ARTHRALGIAS: 0
BLOATING: 1
DECREASED CONCENTRATION: 1
FLANK PAIN: 1
POLYDIPSIA: 0
WEIGHT GAIN: 0
JAUNDICE: 0
TINGLING: 0
LOSS OF CONSCIOUSNESS: 0

## 2018-03-21 DIAGNOSIS — N28.9 RENAL LESION: Primary | ICD-10-CM

## 2018-03-22 ENCOUNTER — TELEPHONE (OUTPATIENT)
Dept: INTERVENTIONAL RADIOLOGY/VASCULAR | Facility: CLINIC | Age: 33
End: 2018-03-22

## 2018-03-22 ENCOUNTER — OFFICE VISIT (OUTPATIENT)
Dept: UROLOGY | Facility: CLINIC | Age: 33
End: 2018-03-22
Payer: COMMERCIAL

## 2018-03-22 VITALS — HEART RATE: 72 BPM | HEIGHT: 65 IN | OXYGEN SATURATION: 98 % | BODY MASS INDEX: 29.82 KG/M2 | WEIGHT: 179 LBS

## 2018-03-22 DIAGNOSIS — N28.89 LEFT RENAL MASS: Primary | ICD-10-CM

## 2018-03-22 DIAGNOSIS — N28.9 RENAL LESION: ICD-10-CM

## 2018-03-22 LAB
ALBUMIN UR-MCNC: NEGATIVE MG/DL
APPEARANCE UR: CLEAR
BILIRUB UR QL STRIP: NEGATIVE
COLOR UR AUTO: YELLOW
GLUCOSE UR STRIP-MCNC: NEGATIVE MG/DL
HGB UR QL STRIP: NEGATIVE
KETONES UR STRIP-MCNC: ABNORMAL MG/DL
LEUKOCYTE ESTERASE UR QL STRIP: NEGATIVE
NITRATE UR QL: NEGATIVE
PH UR STRIP: 6 PH (ref 5–7)
SOURCE: ABNORMAL
SP GR UR STRIP: 1.02 (ref 1–1.03)
UROBILINOGEN UR STRIP-ACNC: 1 EU/DL (ref 0.2–1)

## 2018-03-22 PROCEDURE — 81003 URINALYSIS AUTO W/O SCOPE: CPT | Performed by: UROLOGY

## 2018-03-22 PROCEDURE — 99203 OFFICE O/P NEW LOW 30 MIN: CPT | Performed by: UROLOGY

## 2018-03-22 RX ORDER — CETIRIZINE HYDROCHLORIDE 10 MG/1
10 TABLET ORAL DAILY
COMMUNITY
End: 2020-07-27

## 2018-03-22 ASSESSMENT — PAIN SCALES - GENERAL: PAINLEVEL: NO PAIN (0)

## 2018-03-22 NOTE — LETTER
3/22/2018       RE: Nel Durán  [address]  [place] [address]     Dear Colleague,    Thank you for referring your patient, Nel Durán, to the [facility] [place] [facility]. Please see a copy of my visit note below.    [facility]  Main Office: [address]  [address]  [place] [address]       CHIEF COMPLAINT:   Left renal lesion    HISTORY:   I was asked by Cole Zayas at [facility] to see this 33-year-old woman for a left renal lesion. She has a prior history of a hysterectomy in  because of pelvic pain after 2  sections. She had continued pelvic pain after hysterectomy and did undergo a laparoscopy with lysis of adhesions once at the [facility] in 2015. Of note, a cystotomy occurred during the procedure and it was repaired by Dr. Don. She had a recent CT scan and pelvic ultrasound performed because of left lower quadrant pain. A 3 cm ovarian cyst was discovered. She also incidentally had a 1.6 cm left-sided renal mass discovered. It was inconclusive as to whether this was a solid mass or fluid-filled/cyst. She had an MRI performed for follow-up and this also could not determine solid versus fluid filled mass. She is sent here today for evaluation.       PAST MEDICAL HISTORY:   Past Medical History:   Diagnosis Date     ADD (attention deficit disorder)      ASCUS favor benign 2014    neg hpv cotest in 3 yrs     depression     Not currently on meds     History of blood transfusion 2013    6 units after hysterectomy     Menarche age 12     Pelvic pain        PAST SURGICAL HISTORY:     Past Surgical History:   Procedure Laterality Date     C/SECTION, LOW TRANSVERSE        x2     CERVICECTOMY (TRACHELECTOMY) N/A 2015    not done     COLONOSCOPY  2014    Procedure: COMBINED COLONOSCOPY, SINGLE BIOPSY/POLYPECTOMY BY BIOPSY;  Surgeon: Devyn  Elizabeth Shore MD;  Location:  GI     CYSTOSCOPY  4/5/2013    Procedure: CYSTOSCOPY;;  Surgeon: Mariel Smith MD;  Location: [facility] OR     LABIALPLASTY  4/4/2013    Procedure: LABIALPLASTY;;  Surgeon: Leticia Staley MD;  Location: [facility] OR     LAPAROSCOPIC HYSTERECTOMY SUPRACERVICAL  4/4/2013    simple hyperplasiaProcedure: LAPAROSCOPIC HYSTERECTOMY SUPRACERVICAL;  Laparoscopic Supracervical Hysterectomy, left labialplasty;  Surgeon: Leticia Staley MD;  Location: [facility] OR     LAPAROSCOPIC LYSIS ADHESIONS N/A 1/9/2015    Procedure: LAPAROSCOPIC LYSIS ADHESIONS;  Surgeon: Sue Johnston MD;  Location: [facility] OR     LAPAROSCOPIC TUBAL LIGATION  3/7/2013    Procedure: LAPAROSCOPIC TUBAL LIGATION;  Bilateral Laparoscopic Tubal Ligation With Intrauterine Device Removal ;  Surgeon: Jaime Patrick MD;  Location: [facility] OR     LAPAROSCOPY DIAGNOSTIC (GYN)  4/5/2013    Procedure: LAPAROSCOPY DIAGNOSTIC (GYN);;  Surgeon: Mariel Smith MD;  Location: [facility] OR     LAPAROSCOPY OPERATIVE ADULT N/A 1/9/2015    Procedure: LAPAROSCOPY OPERATIVE ADULT;  Surgeon: Sue Johnston MD;  Location: [facility] OR     LAPAROTOMY EXPLORATORY  4/5/2013    Procedure: LAPAROTOMY EXPLORATORY;  ligasure of pedicles and cervical stump;  Surgeon: Mariel Smith MD;  Location: [facility] OR       FAMILY HISTORY:   Family History   Problem Relation Age of Onset     GASTROINTESTINAL DISEASE Mother      colon polyps     GASTROINTESTINAL DISEASE Father      colon polyps     Family History Negative Other      Cancer - colorectal Maternal Grandmother      thinks grandparents on both sides had colon cancer, unsure of details     Cancer - colorectal Paternal Grandmother        SOCIAL HISTORY:   Social History   Substance Use Topics     Smoking status: Never Smoker     Smokeless tobacco: Never Used     Alcohol use No        ALLERGIES:  Allergies   Allergen Reactions     No Known Drug Allergy        MEDICATIONS:     Current Outpatient  Prescriptions:      B Complex-C-Folic Acid (B COMPLEX + C TR PO), , Disp: , Rfl:      Omega-3 Fatty Acids (FISH OIL PO), , Disp: , Rfl:      amphetamine-dextroamphetamine (ADDERALL XR) 20 MG per 24 hr capsule, Take 1 capsule (20 mg) by mouth daily, Disp: 30 capsule, Rfl: 0     amphetamine-dextroamphetamine (ADDERALL) 20 MG per tablet, Take 1 tablet (20 mg) by mouth 2 times daily, Disp: , Rfl: 0  No current facility-administered medications for this visit.     Facility-Administered Medications Ordered in Other Visits:      lidocaine 1 % injection, , , PRN, Sarah Barillas, SYDNI CRNA, 0.1 mL at 03/07/13 0818    REVIEW OF SYSTEMS:  Allergic/Immunologic: negative  Constitutional Symptoms: negative   Fever: none   Weight loss: none   Other: none  Hematologic/Lymphatic: negative  Integumentary: negative   Breast: negative   Hair: negative   Skin: negative   Skin: negative  Eyes: negative  Ears/Nose/Throat: negative  Respiratory: negative  Cardiovascular: negative  Gastrointestinal: negative  Genitourinary: negative  Musculoskeletal: negative  Neurologic: negative  Psychiatric: negative  Reproductive System: negative  Endocrine: negative      PHYSICAL EXAM:  VS: HR: Data Unavailable    BP: Data Unavailable      WT: 0 lbs 0 oz       HT: Data Unavailable    General appearance: In NAD, conversant  HEENT: normocephalic and atraumatic, anicteric sclera  Lymph Nodes: not examined  Cardiovascular: not examined  Respiratory: normal, non-labored breathing  Abdomen: soft, non-tender, and non-distended  Back/Flank: not examined  Peripheral Vascular/extremity: no peripheral edema  Lymphatic: not examined  Skin: Normal temperature, turgor, and texture. No rash  Psychiatric: Appropriate affect. Alert and Oriented to person, place, and time    Pelvic:    External genitalia: not examined   Urethra: not examined   Vagina: not examined      Cystoscopy:     Laboratory Studies:     Imaging Studies: I reviewed her CT scan and MRI  images. She has a renal lesion in the lateral aspect of the lower pole of the left kidney. On both the CT scan and MRI really cannot tell whether this is a fluid filled cyst or solid mass      CLINICAL IMPRESSION:   Left renal lesion    PLAN:   We discussed her findings on both her CT scan and MRI. In looking at both of these studies really cannot determine whether or not this is a fluid-filled cyst or a solid mass. In light of this, we discussed her options. We could proceed with surgical treatment of the mass. We could also commence with surveillance of the mass with either a repeat CT scan or serial ultrasounds to monitor the mass regrowth. We could also consider a biopsy of the renal mass. Since she is of such a young age and in good health I would lean towards doing a renal mass biopsy. Although both options, biopsy versus surveillance are reasonable in my opinion. We discussed both options and she would like to go ahead with a biopsy of the renal mass. I will get this ordered. I will contact her when the results are available.      Again, thank you for allowing me to participate in the care of your patient.      Sincerely,    Matthew Aranda MD

## 2018-03-22 NOTE — TELEPHONE ENCOUNTER
Interventional Radiology   Interventional Radiology at [facility] has been requested to perform a Left renal mass biopsy from Dr Aranda.  Nel Durán is a 33 year old woman with a history of dysthymia, endometriosis and Left lower quadrant pain.  She has been seen for abdominal pain since her c sections. She had a hysterectomy in 2013 and a laparascopic lysis of adhesions in 2015 to help with control of abdominal pain. She had a CT scan and pelvic US done recently for further evaluation of the LLQ pain and an incidental finding of a 1.6 cm renal lesions was found. She was sent to Urology who requested a biopsy for further evaluation  Reviewed imaging and clinical information with Radiology staff Dr Turner who approved the biopsy with Ultrasound and/or CT imaging (both rooms will be held in case it is difficult to visualize in either modality for biopsy). It may be difficult to see the lesion during CT biopsy without IV contrast which is short lived. The US didn't show the lesions well but it was not specifically looked for. When biopsy is attempted that will be the main focus.   Central scheduling will contact the patient to schedule the biopsy.     Thanks Bibiana [facility] Interventional Radiology CNP ([phone])

## 2018-03-22 NOTE — MR AVS SNAPSHOT
After Visit Summary   3/22/2018    Nel Durán    MRN: [id number]           Patient Information     Date Of Birth          1985        Visit Information        Provider Department      3/22/2018 8:30 AM Matthew Aranda MD [facility] [place]        Today's Diagnoses     Left renal mass    -  1    Renal lesion           Follow-ups after your visit        Follow-up notes from your care team     Return for I ordered renal mass biopsy with interventional radiology , I will call with results.      Future tests that were ordered for you today     Open Future Orders        Priority Expected Expires Ordered    IR Renal Biopsy Left Routine  3/22/2019 3/22/2018            Who to contact     If you have questions or need follow up information about today's clinic visit or your schedule please contact [facility] UROLOG[facility] directly at [phone].  Normal or non-critical lab and imaging results will be communicated to you by PillGuardhart, letter or phone within 4 business days after the clinic has received the results. If you do not hear from us within 7 days, please contact the clinic through PillGuardhart or phone. If you have a critical or abnormal lab result, we will notify you by phone as soon as possible.  Submit refill requests through Entomo or call your pharmacy and they will forward the refill request to us. Please allow 3 business days for your refill to be completed.          Additional Information About Your Visit        MyChart Information     [employer] gives you secure access to your electronic health record. If you see a primary care provider, you can also send messages to your care team and make appointments. If you have questions, please call your primary care clinic.  If you do not have a primary care provider, please call [phone] and they will assist you.        Care EveryWhere ID     This is your Care EveryWhere ID.  "This could be used by other organizations to access your [place] medical records  [id number]        Your Vitals Were     Pulse Height Last Period Pulse Oximetry BMI (Body Mass Index)       72 1.651 m (5' 5\") 03/09/2013 98% 29.79 kg/m2        Blood Pressure from Last 3 Encounters:   02/22/18 124/86   02/05/18 131/83   08/18/17 120/70    Weight from Last 3 Encounters:   03/22/18 81.2 kg (179 lb)   02/22/18 81.2 kg (179 lb)   02/05/18 79.4 kg (175 lb)              We Performed the Following     UA without Microscopic        Primary Care Provider Office Phone # Fax #    [facility] [phone] [address]  [facility] [address]        Equal Access to Services     KARMEN RUEDA : Hadii juaquin rubin hadasho Somatthias, waaxda luqadaha, qaybta kaalmada aderommelyada, tian tanner . So [facility] [place] [facility] [phone].    ATENCIÓN: Si habla español, tiene a maza disposición servicios gratuitos de asistencia lingüística. Llame al [phone].    We comply with applicable federal civil rights laws and [place] laws. We do not discriminate on the basis of race, color, national origin, age, disability, sex, sexual orientation, or gender identity.            Thank you!     Thank you for choosing [facility]OLOG[facility] CL[facility]IC [place]  for your care. Our goal is always to provide you with excellent care. Hearing back from our patients is one way we can continue to improve our services. Please take a few minutes to complete the written survey that you may receive in the mail after your visit with us. Thank you!             Your Updated Medication List - Protect others around you: Learn how to safely use, store and throw away your medicines at www.disposemymeds.org.          This list is accurate as of 3/22/18  9:15 AM.  Always use your most recent med list.                   Brand Name Dispense Instructions for use Diagnosis    * ADDERALL XR 20 MG per 24 hr capsule "   Generic drug:  amphetamine-dextroamphetamine     30 capsule    Take 1 capsule (20 mg) by mouth daily        * ADDERALL 20 MG per tablet   Generic drug:  amphetamine-dextroamphetamine      Take 1 tablet (20 mg) by mouth 2 times daily        B COMPLEX + C TR PO           cetirizine 10 MG tablet    zyrTEC     Take 10 mg by mouth daily        FISH OIL PO           * Notice:  This list has 2 medication(s) that are the same as other medications prescribed for you. Read the directions carefully, and ask your doctor or other care provider to review them with you.

## 2018-03-22 NOTE — PROGRESS NOTES
[facility]  Main Office: [address]  [address]  [place] [address]       CHIEF COMPLAINT:   Left renal lesion    HISTORY:   I was asked by Cole Zayas at [facility] to see this 33-year-old woman for a left renal lesion. She has a prior history of a hysterectomy in  because of pelvic pain after 2  sections. She had continued pelvic pain after hysterectomy and did undergo a laparoscopy with lysis of adhesions once at the [facility] in 2015. Of note, a cystotomy occurred during the procedure and it was repaired by Dr. Don. She had a recent CT scan and pelvic ultrasound performed because of left lower quadrant pain. A 3 cm ovarian cyst was discovered. She also incidentally had a 1.6 cm left-sided renal mass discovered. It was inconclusive as to whether this was a solid mass or fluid-filled/cyst. She had an MRI performed for follow-up and this also could not determine solid versus fluid filled mass. She is sent here today for evaluation.       PAST MEDICAL HISTORY:   Past Medical History:   Diagnosis Date     ADD (attention deficit disorder)      ASCUS favor benign 2014    neg hpv cotest in 3 yrs     depression 2000    Not currently on meds     History of blood transfusion 2013    6 units after hysterectomy     Menarche age 12     Pelvic pain        PAST SURGICAL HISTORY:     Past Surgical History:   Procedure Laterality Date     C/SECTION, LOW TRANSVERSE        x2     CERVICECTOMY (TRACHELECTOMY) N/A 2015    not done     COLONOSCOPY  2014    Procedure: COMBINED COLONOSCOPY, SINGLE BIOPSY/POLYPECTOMY BY BIOPSY;  Surgeon: Elizabeth Shepard MD;  [facility]     CYSTOSCOPY  2013    Procedure: CYSTOSCOPY;;  Surgeon: Mariel Smith MD;  [facility]     LABIALPLASTY  2013    Procedure: LABIALPLASTY;;  Surgeon: Leticia Staley MD;  [facility]     LAPAROSCOPIC HYSTERECTOMY SUPRACERVICAL  2013    simple  hyperplasiaProcedure: LAPAROSCOPIC HYSTERECTOMY SUPRACERVICAL;  Laparoscopic Supracervical Hysterectomy, left labialplasty;  Surgeon: Leticia Staley MD;  [facility]     LAPAROSCOPIC LYSIS ADHESIONS N/A 1/9/2015    Procedure: LAPAROSCOPIC LYSIS ADHESIONS;  Surgeon: Sue Johnston MD;  [facility]     LAPAROSCOPIC TUBAL LIGATION  3/7/2013    Procedure: LAPAROSCOPIC TUBAL LIGATION;  Bilateral Laparoscopic Tubal Ligation With Intrauterine Device Removal ;  Surgeon: Jaime Patrick MD;  [facility]     LAPAROSCOPY DIAGNOSTIC (GYN)  4/5/2013    Procedure: LAPAROSCOPY DIAGNOSTIC (GYN);;  Surgeon: Mariel Smith MD;  [facility]     LAPAROSCOPY OPERATIVE ADULT N/A 1/9/2015    Procedure: LAPAROSCOPY OPERATIVE ADULT;  Surgeon: Sue Johnston MD;  [facility]     LAPAROTOMY EXPLORATORY  4/5/2013    Procedure: LAPAROTOMY EXPLORATORY;  ligasure of pedicles and cervical stump;  Surgeon: Mariel Smith MD;  [facility]       FAMILY HISTORY:   Family History   Problem Relation Age of Onset     GASTROINTESTINAL DISEASE Mother      colon polyps     GASTROINTESTINAL DISEASE Father      colon polyps     Family History Negative Other      Cancer - colorectal Maternal Grandmother      thinks grandparents on both sides had colon cancer, unsure of details     Cancer - colorectal Paternal Grandmother        SOCIAL HISTORY:   Social History   Substance Use Topics     Smoking status: Never Smoker     Smokeless tobacco: Never Used     Alcohol use No        ALLERGIES:  Allergies   Allergen Reactions     No Known Drug Allergy        MEDICATIONS:     Current Outpatient Prescriptions:      B Complex-C-Folic Acid (B COMPLEX + C TR PO), , Disp: , Rfl:      Omega-3 Fatty Acids (FISH OIL PO), , Disp: , Rfl:      amphetamine-dextroamphetamine (ADDERALL XR) 20 MG per 24 hr capsule, Take 1 capsule (20 mg) by mouth daily, Disp: 30 capsule, Rfl: 0     amphetamine-dextroamphetamine (ADDERALL) 20 MG  per tablet, Take 1 tablet (20 mg) by mouth 2 times daily, Disp: , Rfl: 0  No current facility-administered medications for this visit.     Facility-Administered Medications Ordered in Other Visits:      lidocaine 1 % injection, , , PRN, Sarah Barillas APRN CRNA, 0.1 mL at 03/07/13 0818    REVIEW OF SYSTEMS:  Allergic/Immunologic: negative  Constitutional Symptoms: negative   Fever: none   Weight loss: none   Other: none  Hematologic/Lymphatic: negative  Integumentary: negative   Breast: negative   Hair: negative   Skin: negative   Skin: negative  Eyes: negative  Ears/Nose/Throat: negative  Respiratory: negative  Cardiovascular: negative  Gastrointestinal: negative  Genitourinary: negative  Musculoskeletal: negative  Neurologic: negative  Psychiatric: negative  Reproductive System: negative  Endocrine: negative      PHYSICAL EXAM:  VS: HR: Data Unavailable    BP: Data Unavailable      WT: 0 lbs 0 oz       HT: Data Unavailable    General appearance: In NAD, conversant  HEENT: normocephalic and atraumatic, anicteric sclera  Lymph Nodes: not examined  Cardiovascular: not examined  Respiratory: normal, non-labored breathing  Abdomen: soft, non-tender, and non-distended  Back/Flank: not examined  Peripheral Vascular/extremity: no peripheral edema  Lymphatic: not examined  Skin: Normal temperature, turgor, and texture. No rash  Psychiatric: Appropriate affect. Alert and Oriented to person, place, and time    Pelvic:    External genitalia: not examined   Urethra: not examined   Vagina: not examined      Cystoscopy:     Laboratory Studies:     Imaging Studies: I reviewed her CT scan and MRI images. She has a renal lesion in the lateral aspect of the lower pole of the left kidney. On both the CT scan and MRI really cannot tell whether this is a fluid filled cyst or solid mass      CLINICAL IMPRESSION:   Left renal lesion    PLAN:   We discussed her findings on both her CT scan and MRI. In looking at both of these  studies really cannot determine whether or not this is a fluid-filled cyst or a solid mass. In light of this, we discussed her options. We could proceed with surgical treatment of the mass. We could also commence with surveillance of the mass with either a repeat CT scan or serial ultrasounds to monitor the mass regrowth. We could also consider a biopsy of the renal mass. Since she is of such a young age and in good health I would lean towards doing a renal mass biopsy. Although both options, biopsy versus surveillance are reasonable in my opinion. We discussed both options and she would like to go ahead with a biopsy of the renal mass. I will get this ordered. I will contact her when the results are available.      Matthew Aranda M.D.    Answers for HPI/ROS submitted by the patient on 3/19/2018   General Symptoms: Yes  Skin Symptoms: No  HENT Symptoms: No  EYE SYMPTOMS: Yes  HEART SYMPTOMS: Yes  LUNG SYMPTOMS: No  INTESTINAL SYMPTOMS: Yes  URINARY SYMPTOMS: Yes  GYNECOLOGIC SYMPTOMS: No  BREAST SYMPTOMS: No  SKELETAL SYMPTOMS: Yes  BLOOD SYMPTOMS: Yes  NERVOUS SYSTEM SYMPTOMS: Yes  MENTAL HEALTH SYMPTOMS: Yes  Fever: No  Loss of appetite: No  Weight loss: Yes  Weight gain: No  Fatigue: Yes  Night sweats: No  Chills: No  Increased stress: No  Excessive hunger: No  Excessive thirst: No  Feeling hot or cold when others believe the temperature is normal: Yes  Loss of height: No  Post-operative complications: No  Surgical site pain: No  Hallucinations: No  Change in or Loss of Energy: Yes  Hyperactivity: No  Confusion: No  Eye pain: No  Vision loss: No  Dry eyes: Yes  Watery eyes: Yes  Eye bulging: No  Double vision: No  Flashing of lights: No  Spots: No  Floaters: No  Redness: No  Crossed eyes: No  Tunnel Vision: No  Yellowing of eyes: No  Eye irritation: No  Chest pain or pressure: No  Fast or irregular heartbeat: No  Pain in legs with walking: No  Trouble breathing while lying down: No  Fingers or toes appear  blue: No  High blood pressure: No  Low blood pressure: No  Fainting: No  Murmurs: No  Pacemaker: No  Varicose veins: No  Edema or swelling: No  Wake up at night with shortness of breath: No  Light-headedness: Yes  Exercise intolerance: No  Heart burn or indigestion: No  Nausea: Yes  Vomiting: No  Abdominal pain: Yes  Bloating: Yes  Constipation: Yes  Diarrhea: Yes  Blood in stool: No  Black stools: No  Rectal or Anal pain: No  Fecal incontinence: No  Yellowing of skin or eyes: No  Vomit with blood: No  Change in stools: No  Trouble holding urine or incontinence: No  Pain or burning: Yes  Trouble starting or stopping: No  Increased frequency of urination: Yes  Blood in urine: No  Decreased frequency of urination: No  Frequent nighttime urination: No  Flank pain: Yes  Difficulty emptying bladder: No  Back pain: Yes  Muscle aches: Yes  Neck pain: Yes  Swollen joints: No  Joint pain: No  Bone pain: No  Muscle cramps: No  Muscle weakness: No  Joint stiffness: Yes  Bone fracture: No  Anemia: No  Swollen glands: No  Easy bleeding or bruising: Yes  Trouble with coordination: Yes  Dizziness or trouble with balance: Yes  Fainting or black-out spells: No  Memory loss: No  Headache: Yes  Seizures: No  Speech problems: No  Tingling: No  Tremor: No  Weakness: No  Difficulty walking: No  Paralysis: No  Numbness: No  Nervous or Anxious: No  Depression: No  Trouble sleeping: Yes  Trouble thinking or concentrating: Yes  Mood changes: Yes  Panic attacks: No

## 2018-03-27 ENCOUNTER — HOSPITAL ENCOUNTER (OUTPATIENT)
Dept: ULTRASOUND IMAGING | Facility: CLINIC | Age: 33
End: 2018-03-27
Attending: UROLOGY | Admitting: RADIOLOGY
Payer: COMMERCIAL

## 2018-03-27 ENCOUNTER — HOSPITAL ENCOUNTER (OUTPATIENT)
Dept: CT IMAGING | Facility: CLINIC | Age: 33
End: 2018-03-27
Attending: UROLOGY | Admitting: RADIOLOGY
Payer: COMMERCIAL

## 2018-03-27 ENCOUNTER — HOSPITAL ENCOUNTER (OUTPATIENT)
Facility: CLINIC | Age: 33
Discharge: HOME | End: 2018-03-27
Attending: RADIOLOGY | Admitting: RADIOLOGY
Payer: COMMERCIAL

## 2018-03-27 VITALS
SYSTOLIC BLOOD PRESSURE: 109 MMHG | BODY MASS INDEX: 29.99 KG/M2 | DIASTOLIC BLOOD PRESSURE: 65 MMHG | HEIGHT: 65 IN | RESPIRATION RATE: 16 BRPM | OXYGEN SATURATION: 97 % | WEIGHT: 180 LBS | TEMPERATURE: 98.5 F | HEART RATE: 65 BPM

## 2018-03-27 DIAGNOSIS — N28.89 LEFT RENAL MASS: ICD-10-CM

## 2018-03-27 DIAGNOSIS — N28.9 RENAL LESION: ICD-10-CM

## 2018-03-27 LAB
INR PPP: 1.22 (ref 0.86–1.14)
PLATELET # BLD AUTO: 160 10E9/L (ref 150–450)

## 2018-03-27 PROCEDURE — 76775 US EXAM ABDO BACK WALL LIM: CPT

## 2018-03-27 PROCEDURE — 88305 TISSUE EXAM BY PATHOLOGIST: CPT | Performed by: RADIOLOGY

## 2018-03-27 PROCEDURE — 88161 CYTOPATH SMEAR OTHER SOURCE: CPT | Mod: 26 | Performed by: RADIOLOGY

## 2018-03-27 PROCEDURE — 88173 CYTOPATH EVAL FNA REPORT: CPT | Mod: 26 | Performed by: RADIOLOGY

## 2018-03-27 PROCEDURE — 85049 AUTOMATED PLATELET COUNT: CPT | Performed by: RADIOLOGY

## 2018-03-27 PROCEDURE — 88172 CYTP DX EVAL FNA 1ST EA SITE: CPT | Mod: 26 | Performed by: RADIOLOGY

## 2018-03-27 PROCEDURE — 88173 CYTOPATH EVAL FNA REPORT: CPT | Performed by: RADIOLOGY

## 2018-03-27 PROCEDURE — 88161 CYTOPATH SMEAR OTHER SOURCE: CPT | Performed by: RADIOLOGY

## 2018-03-27 PROCEDURE — 85610 PROTHROMBIN TIME: CPT | Performed by: RADIOLOGY

## 2018-03-27 PROCEDURE — 25000125 ZZHC RX 250: Performed by: UROLOGY

## 2018-03-27 PROCEDURE — 40000863 ZZH STATISTIC RADIOLOGY XRAY, US, CT, MAR, NM

## 2018-03-27 PROCEDURE — 88172 CYTP DX EVAL FNA 1ST EA SITE: CPT | Performed by: RADIOLOGY

## 2018-03-27 PROCEDURE — 36415 COLL VENOUS BLD VENIPUNCTURE: CPT | Performed by: RADIOLOGY

## 2018-03-27 PROCEDURE — 88305 TISSUE EXAM BY PATHOLOGIST: CPT | Mod: 26 | Performed by: RADIOLOGY

## 2018-03-27 PROCEDURE — 27211116 CT RENAL BIOPSY PERCUTANEOUS

## 2018-03-27 RX ORDER — LIDOCAINE HYDROCHLORIDE 10 MG/ML
10 INJECTION, SOLUTION EPIDURAL; INFILTRATION; INTRACAUDAL; PERINEURAL ONCE
Status: COMPLETED | OUTPATIENT
Start: 2018-03-27 | End: 2018-03-27

## 2018-03-27 RX ORDER — LIDOCAINE 40 MG/G
CREAM TOPICAL
Status: DISCONTINUED | OUTPATIENT
Start: 2018-03-27 | End: 2018-03-27 | Stop reason: HOSPADM

## 2018-03-27 RX ADMIN — LIDOCAINE HYDROCHLORIDE 10 ML: 10 INJECTION, SOLUTION EPIDURAL; INFILTRATION; INTRACAUDAL; PERINEURAL at 09:55

## 2018-03-27 NOTE — IP AVS SNAPSHOT
[facility] [address]    [place] [address]    Phone:  [phone]                                       After Visit Summary   3/27/2018    Nel Durán    MRN: [id number]           After Visit Summary Signature Page     I have received my discharge instructions, and my questions have been answered. I have discussed any challenges I see with this plan with the nurse or doctor.    ..........................................................................................................................................  Patient/Patient Representative Signature      ..........................................................................................................................................  Patient Representative Print Name and Relationship to Patient    ..................................................               ................................................  Date                                            Time    ..........................................................................................................................................  Reviewed by Signature/Title    ...................................................              ..............................................  Date                                                            Time

## 2018-03-27 NOTE — PROGRESS NOTES
RADIOLOGY PROCEDURE NOTE  Patient name: Nel Durán  MRN: [id number]  : 1985    Pre-procedure diagnosis: Left renal lesion  Post-procedure diagnosis: Same    Procedure Date/Time: 2018  10:23 AM  Procedure: Biopsy.  Estimated blood loss: None  Specimen(s) collected with description: Core biopsy samples.  The patient tolerated the procedure well with a small post biopsy perinephric hematoma.    See imaging dictation for procedural details and findings.    Provider name: Owen Turner  Assistant(s):None

## 2018-03-27 NOTE — PLAN OF CARE
Pt admitted for renal biopsy, accompanied by spouse. INR and PTT drawn, WNL. Post-procedure discharge instructions reviewed with pt, questions answered.

## 2018-03-27 NOTE — PLAN OF CARE
VSS following procedure, L flank adhesive strip clean and dry. Pt reports mild back tenderness, declines intervention.

## 2018-03-27 NOTE — PLAN OF CARE
30 minutes of bedrest complete, site remains dry and soft, slightly tender. Pt up to bathroom. Pt and spouse deny questions and concerns at this time. Discharging home via spouse.

## 2018-03-27 NOTE — IP AVS SNAPSHOT
MRN:[id number]                      After Visit Summary   3/27/2018    Nel Durán    MRN: [id number]           Visit Information        Department      3/27/2018  7:16 AM [facility]          Review of your medicines      UNREVIEWED medicines. Ask your doctor about these medicines        Dose / Directions    * ADDERALL XR 20 MG per 24 hr capsule   Generic drug:  amphetamine-dextroamphetamine        Dose:  20 mg   Take 1 capsule (20 mg) by mouth daily   Quantity:  30 capsule   Refills:  0       * ADDERALL 20 MG per tablet   Generic drug:  amphetamine-dextroamphetamine        Dose:  20 mg   Take 1 tablet (20 mg) by mouth 2 times daily   Refills:  0       B COMPLEX + C TR PO        Refills:  0       cetirizine 10 MG tablet   Commonly known as:  zyrTEC        Dose:  10 mg   Take 10 mg by mouth daily   Refills:  0       FISH OIL PO        Refills:  0       * Notice:  This list has 2 medication(s) that are the same as other medications prescribed for you. Read the directions carefully, and ask your doctor or other care provider to review them with you.             Protect others around you: Learn how to safely use, store and throw away your medicines at www.disposemymeds.org.         Follow-ups after your visit        Your next 10 appointments already scheduled     Mar 27, 2018  8:30 AM CDT   (Arrive by 8:15 AM)   CT RENAL BIOPSY PERCUTANEOUS with ARIC LIAO IMAGING NURSE, [facility] RAD   [facility] CT ([facility])    [address] [place] [id number] [phone]           Please bring any scans or X-rays taken at other hospitals, if they may be helpful. Also bring a list of your medicines, including vitamins, minerals and over-the-counter drugs.  Tell your doctor in advance:   If you have any allergies.   If you are breastfeeding or there s any chance you are pregnant.   If you are taking Coumadin (or any other blood thinners) 5 days  prior to the exam for any special instructions.   If you are diabetic to determine if your insulin needs have to be adjusted for the exam.  The day before your exam:   Drink extra fluids  at least six 8-ounce glasses (unless your doctor tells you to restrict fluids).  The day of your exam:   No eating or drinking for 4 hours before your test. You may take medicine with small sips of water.   Plan for an adult to drive you home and stay with you until morning.   Leave your valuables at home.  If you have any questions, please call the imaging department where you will have your exam.            Mar 27, 2018  8:30 AM CDT   (Arrive by 8:15 AM)   US BIOPSY RENAL with SHUS4   [facility] ([facility])    [address] [place] [address] [phone]           Tell us in advance if there s any chance you may be pregnant.  Bring a list of your medicines to the exam. Include vitamins, minerals and over-the-counter drugs.  No eating or drinking for 4 hours prior to exam.  If you take blood thinners, you may need to stop taking them a few days before treatment. Talk to your doctor before stopping these medicines. You will need a blood test the morning of your exam.   Stop taking Coumadin (warfarin) 3 days before your exam. Restart the day after your exam.   If you take aspirin, you may need to stop taking it 3 days before your scan.   If you take Plavix, Ticlid, Pletal or Persantine, you may need to stop taking them 5 days before your scan. Please talk to your doctor before stopping these medicines.  If you will receive sedation for this test (medicine to help you relax):   See your family doctor for an exam within 30 days of treatment.   Plan for an adult to drive you home and stay with you for at least 6 hours.   Follow the eating and drinking guidelines checked below:   No eating or drinking for 4 hours before your test. You may take medicine with small sips of water.   If  you have diabetes:If you take insulin, call your diabetes care team. Do not take diabetes pills on the morning of your test. If you take metformin (Avandamet, Glucophage, Glucovance, Metaglip) and received contrast, wait 48 hours before re-starting this medicine.  Please call the Imaging Department at your exam site with any questions.               Care Instructions        Further instructions from your care team       Renal Biopsy Discharge Instructions     After you go home:      You may resume your normal diet    Have an adult stay with you for 6 hours if you received sedation       For 24 hours - due to the sedation you received:    Relax and take it easy    Do NOT make any important or legal decisions    Do NOT drive or operate machines at home or at work    Do NOT drink alcohol    Care of Puncture Site:      For the first 48 hrs, check your puncture site every couple hours while you are awake     You may remove/change the bandaid tomorrow    You may shower tomorrow    No tub baths, whirlpools or swimming until your puncture site has fully healed    Activity       You may go back to normal activity in 24 hours     Wait 48 hours before lifting, straining, exercise or other strenuous activity    Medicines:      You may resume all medications    Resume your Warfarin/Coumadin at your regular dose today. Follow up with your provider to have your INR rechecked    Resume your Platelet Inhibitors and Aspirin tomorrow at your regular dose    For minor pain, you may take Acetaminophen (Tylenol) or Ibuprofen (Advil)            Call the provider who ordered this test if:      Increased pain or a large or growing hard lump around the site    Blood or fluid is draining from the site    The site is red, swollen, hot or tender    Chills or a fever greater than 101 F (38 C)    Pain that is getting worse    Any questions or concerns    Call  911 or go to the Emergency Room if:      Severe pain or trouble breathing    Bleeding  "that you cannot control        If you have questions call:          [facility] Radiology Dept @ [phone]                   Additional Information About Your Visit        MyChart Information     [employer] gives you secure access to your electronic health record. If you see a primary care provider, you can also send messages to your care team and make appointments. If you have questions, please call your primary care clinic.  If you do not have a primary care provider, please call [phone] and they will assist you.        Care EveryWhere ID     This is your Care EveryWhere ID. This could be used by other organizations to access your [facility] medical records  [id number]        Your Vitals Were     Blood Pressure Pulse Temperature Respirations Height Weight    109/57 (BP Location: Left arm) 68 98.5  F (36.9  C) (Oral) 16 1.651 m (5' 5\") 81.6 kg (180 lb)    Last Period Pulse Oximetry BMI (Body Mass Index)             03/09/2013 100% 29.95 kg/m2          Primary Care Provider Office Phone # Fax #    [facility] [phone]      Equal Access to Services     KARMEN RUEDA : Hadii juaquin ku hadasho Soomaali, waaxda luqadaha, qaybta kaalmada aderommelyada, tian tanner . So [facility] [phone].    ATENCIÓN: Si habla español, tiene a maza disposición servicios gratuitos de asistencia lingüística. Llame al [phone].    We comply with applicable federal civil rights laws and [place] laws. We do not discriminate on the basis of race, color, national origin, age, disability, sex, sexual orientation, or gender identity.            Thank you!     Thank you for choosing [facility] for your care. Our goal is always to provide you with excellent care. Hearing back from our patients is one way we can continue to improve our services. Please take a few minutes to complete the written survey that you may receive in the mail after you visit with us. Thank you!           "   Medication List: This is a list of all your medications and when to take them. Check marks below indicate your daily home schedule. Keep this list as a reference.      Medications           Morning Afternoon Evening Bedtime As Needed    * ADDERALL XR 20 MG per 24 hr capsule   Take 1 capsule (20 mg) by mouth daily   Generic drug:  amphetamine-dextroamphetamine                                * ADDERALL 20 MG per tablet   Take 1 tablet (20 mg) by mouth 2 times daily   Generic drug:  amphetamine-dextroamphetamine                                B COMPLEX + C TR PO                                cetirizine 10 MG tablet   Commonly known as:  zyrTEC   Take 10 mg by mouth daily                                FISH OIL PO                                * Notice:  This list has 2 medication(s) that are the same as other medications prescribed for you. Read the directions carefully, and ask your doctor or other care provider to review them with you.

## 2018-03-27 NOTE — DISCHARGE INSTRUCTIONS
Renal Biopsy Discharge Instructions     After you go home:      You may resume your normal diet    Have an adult stay with you for 6 hours if you received sedation       For 24 hours - due to the sedation you received:    Relax and take it easy    Do NOT make any important or legal decisions    Do NOT drive or operate machines at home or at work    Do NOT drink alcohol    Care of Puncture Site:      For the first 48 hrs, check your puncture site every couple hours while you are awake     You may remove/change the bandaid tomorrow    You may shower tomorrow    No tub baths, whirlpools or swimming until your puncture site has fully healed    Activity       You may go back to normal activity in 24 hours     Wait 48 hours before lifting, straining, exercise or other strenuous activity    Medicines:      You may resume all medications    Resume your Warfarin/Coumadin at your regular dose today. Follow up with your provider to have your INR rechecked    Resume your Platelet Inhibitors and Aspirin tomorrow at your regular dose    For minor pain, you may take Acetaminophen (Tylenol) or Ibuprofen (Advil)            Call the provider who ordered this test if:      Increased pain or a large or growing hard lump around the site    Blood or fluid is draining from the site    The site is red, swollen, hot or tender    Chills or a fever greater than 101 F (38 C)    Pain that is getting worse    Any questions or concerns    Call  911 or go to the Emergency Room if:      Severe pain or trouble breathing    Bleeding that you cannot control        If you have questions call:          [facility] Radiology Dept @ [phone]

## 2018-03-28 LAB — COPATH REPORT: NORMAL

## 2018-03-30 ENCOUNTER — TELEPHONE (OUTPATIENT)
Dept: UROLOGY | Facility: CLINIC | Age: 33
End: 2018-03-30

## 2018-03-30 DIAGNOSIS — N28.89 RENAL MASS: Primary | ICD-10-CM

## 2018-03-30 NOTE — TELEPHONE ENCOUNTER
Spoke on phone: renal mass biopsy non-diagnostic  I recommended I see her in 6 month with renal ultrasound for surveillance of the mass

## 2019-04-22 ENCOUNTER — OFFICE VISIT (OUTPATIENT)
Dept: FAMILY MEDICINE | Facility: CLINIC | Age: 34
End: 2019-04-22
Payer: COMMERCIAL

## 2019-04-22 VITALS
TEMPERATURE: 98.4 F | OXYGEN SATURATION: 100 % | WEIGHT: 181.6 LBS | HEIGHT: 65 IN | HEART RATE: 100 BPM | SYSTOLIC BLOOD PRESSURE: 135 MMHG | BODY MASS INDEX: 30.26 KG/M2 | DIASTOLIC BLOOD PRESSURE: 89 MMHG

## 2019-04-22 DIAGNOSIS — F41.1 GAD (GENERALIZED ANXIETY DISORDER): Primary | ICD-10-CM

## 2019-04-22 DIAGNOSIS — N28.9 RENAL LESION: ICD-10-CM

## 2019-04-22 DIAGNOSIS — R03.0 ELEVATED BP WITHOUT DIAGNOSIS OF HYPERTENSION: ICD-10-CM

## 2019-04-22 PROCEDURE — 99214 OFFICE O/P EST MOD 30 MIN: CPT | Performed by: PHYSICIAN ASSISTANT

## 2019-04-22 RX ORDER — METOPROLOL SUCCINATE 50 MG/1
50 TABLET, EXTENDED RELEASE ORAL DAILY
Qty: 30 TABLET | Refills: 5 | Status: SHIPPED | OUTPATIENT
Start: 2019-04-22 | End: 2020-07-27

## 2019-04-22 RX ORDER — DEXTROAMPHETAMINE SACCHARATE, AMPHETAMINE ASPARTATE MONOHYDRATE, DEXTROAMPHETAMINE SULFATE AND AMPHETAMINE SULFATE 7.5; 7.5; 7.5; 7.5 MG/1; MG/1; MG/1; MG/1
CAPSULE, EXTENDED RELEASE ORAL
Refills: 0 | COMMUNITY
Start: 2019-04-05 | End: 2020-07-27

## 2019-04-22 ASSESSMENT — ANXIETY QUESTIONNAIRES
6. BECOMING EASILY ANNOYED OR IRRITABLE: NEARLY EVERY DAY
2. NOT BEING ABLE TO STOP OR CONTROL WORRYING: NEARLY EVERY DAY
7. FEELING AFRAID AS IF SOMETHING AWFUL MIGHT HAPPEN: MORE THAN HALF THE DAYS
3. WORRYING TOO MUCH ABOUT DIFFERENT THINGS: NEARLY EVERY DAY
GAD7 TOTAL SCORE: 20
5. BEING SO RESTLESS THAT IT IS HARD TO SIT STILL: NEARLY EVERY DAY
IF YOU CHECKED OFF ANY PROBLEMS ON THIS QUESTIONNAIRE, HOW DIFFICULT HAVE THESE PROBLEMS MADE IT FOR YOU TO DO YOUR WORK, TAKE CARE OF THINGS AT HOME, OR GET ALONG WITH OTHER PEOPLE: EXTREMELY DIFFICULT
1. FEELING NERVOUS, ANXIOUS, OR ON EDGE: NEARLY EVERY DAY

## 2019-04-22 ASSESSMENT — PATIENT HEALTH QUESTIONNAIRE - PHQ9
5. POOR APPETITE OR OVEREATING: NEARLY EVERY DAY
SUM OF ALL RESPONSES TO PHQ QUESTIONS 1-9: 19

## 2019-04-22 ASSESSMENT — MIFFLIN-ST. JEOR: SCORE: 1511.67

## 2019-04-22 NOTE — NURSING NOTE
"Chief Complaint   Patient presents with     MOOD CHANGES     /89   Pulse 100   Temp 98.4  F (36.9  C) (Oral)   Ht 1.638 m (5' 4.5\")   Wt 82.4 kg (181 lb 9.6 oz)   LMP 03/09/2013   SpO2 100%   BMI 30.69 kg/m   Estimated body mass index is 30.69 kg/m  as calculated from the following:    Height as of this encounter: 1.638 m (5' 4.5\").    Weight as of this encounter: 82.4 kg (181 lb 9.6 oz).  Medication Reconciliation: complete      Health Maintenance that is potentially due pending provider review:  Pap Smear    Pt will get pap smear done at physical.    HAWA Olmos  "

## 2019-04-22 NOTE — PROGRESS NOTES
SUBJECTIVE:   eNl Durán is a 34 year old female who presents to clinic today for the following   health issues:      Abnormal Mood Symptoms      Duration: x2-3 months, sx have worsened in the past couple weeks     Description:  Depression: YES- mild   Anxiety: YES  Panic attacks: YES     Accompanying signs and symptoms: see PHQ-9 and CONI scores - stress and decreased appetite     History (similar episodes/previous evaluation): hx of major depression and anxiety in year 2000 - was inpatient during highschool years for sx     Precipitating or alleviating factors: None    Therapies tried and outcome: Celexa (Citalopram), Paxil (Paroxetine), Zoloft (Sertraline) and Abilify - didn't help with sx, Wellbutrin - helped with some sx           Additional history: 33 y/o female here with a flare of her anxiety for the lat few months.  She has been having increases in stress and work.  She has had anxiety in the past, has failed most serotonin specific reuptake inhibitor.  She was on wellbutrin, did help some, but not sure if fully.  Does not remember any side effects.  She has taken a propranolol in the past, and does remember it working well for her anxiety, but did not last all that long.      Since I have seen her last, she had worked with urology for a left renal mass that was indeterminate on both CT and MRI.  They proceeded with bx which was inconclusive.  She has not follow up with them.  Was supposed to get updated US.  Reviewed  and updated as needed this visit by clinical staff         Reviewed and updated as needed this visit by Provider         BP Readings from Last 3 Encounters:   04/22/19 135/89   03/27/18 109/65   02/22/18 124/86    Wt Readings from Last 3 Encounters:   04/22/19 82.4 kg (181 lb 9.6 oz)   03/27/18 81.6 kg (180 lb)   03/22/18 81.2 kg (179 lb)                    ROS:  Constitutional, HEENT, cardiovascular, pulmonary, gi and gu systems are negative, except as otherwise  "noted.    OBJECTIVE:     /89   Pulse 100   Temp 98.4  F (36.9  C) (Oral)   Ht 1.638 m (5' 4.5\")   Wt 82.4 kg (181 lb 9.6 oz)   LMP 03/09/2013   SpO2 100%   BMI 30.69 kg/m    Body mass index is 30.69 kg/m .  GENERAL: alert and no distress  EYES: Eyes grossly normal to inspection  HENT: ear canals and TM's normal, nose and mouth without ulcers or lesions  RESP: lungs clear to auscultation - no rales, rhonchi or wheezes  CV: regular rate and rhythm, normal S1 S2, no S3 or S4, no murmur, click or rub, no peripheral edema and peripheral pulses strong  PSYCH: mentation appears normal, affect normal/bright    Diagnostic Test Results:  none     ASSESSMENT/PLAN:             1. CONI (generalized anxiety disorder)  We discussed different options including going back on wellbutrin, trying new medicine such as buspar, or trying long acting beta blocker.  After discussing the potential benefits and side effects of each, she would like to try the beta blocker.  - metoprolol succinate ER (TOPROL-XL) 50 MG 24 hr tablet; Take 1 tablet (50 mg) by mouth daily  Dispense: 30 tablet; Refill: 5    2. Elevated BP without diagnosis of hypertension  I think this is a good choice, and her BP has been inching up the last few visit.      3.  Renal lesion  Discussed that we do not have US here, and that she should contact scheduling to update.  Will have her follow up with urology after.    Follow up in 2-3 months for recheck.    Jovanny Zayas PA-C  [facility]      "

## 2019-04-23 ASSESSMENT — ANXIETY QUESTIONNAIRES: GAD7 TOTAL SCORE: 20

## 2019-09-24 ENCOUNTER — OFFICE VISIT (OUTPATIENT)
Dept: URGENT CARE | Facility: URGENT CARE | Age: 34
End: 2019-09-24
Payer: COMMERCIAL

## 2019-09-24 ENCOUNTER — ANCILLARY PROCEDURE (OUTPATIENT)
Dept: GENERAL RADIOLOGY | Facility: CLINIC | Age: 34
End: 2019-09-24
Attending: FAMILY MEDICINE
Payer: COMMERCIAL

## 2019-09-24 VITALS
WEIGHT: 181 LBS | TEMPERATURE: 98.9 F | HEART RATE: 94 BPM | RESPIRATION RATE: 18 BRPM | DIASTOLIC BLOOD PRESSURE: 80 MMHG | SYSTOLIC BLOOD PRESSURE: 151 MMHG | OXYGEN SATURATION: 100 % | BODY MASS INDEX: 30.59 KG/M2

## 2019-09-24 DIAGNOSIS — R50.9 FEVER AND CHILLS: ICD-10-CM

## 2019-09-24 DIAGNOSIS — R07.9 CHEST PAIN, UNSPECIFIED TYPE: ICD-10-CM

## 2019-09-24 DIAGNOSIS — J20.9 ACUTE BRONCHITIS, UNSPECIFIED ORGANISM: Primary | ICD-10-CM

## 2019-09-24 PROCEDURE — 71046 X-RAY EXAM CHEST 2 VIEWS: CPT

## 2019-09-24 PROCEDURE — 99214 OFFICE O/P EST MOD 30 MIN: CPT | Performed by: FAMILY MEDICINE

## 2019-09-24 RX ORDER — CHLORAL HYDRATE 500 MG
1000 CAPSULE ORAL
COMMUNITY
End: 2022-07-11

## 2019-09-24 RX ORDER — MULTIVITAMIN WITH IRON
TABLET ORAL
COMMUNITY
End: 2022-07-11

## 2019-09-24 RX ORDER — PREDNISONE 20 MG/1
20 TABLET ORAL 2 TIMES DAILY
Qty: 10 TABLET | Refills: 0 | Status: SHIPPED | OUTPATIENT
Start: 2019-09-24 | End: 2019-09-29

## 2019-09-24 RX ORDER — CYCLOBENZAPRINE HCL 5 MG
TABLET ORAL
COMMUNITY
Start: 2017-08-18 | End: 2020-07-27

## 2019-09-24 RX ORDER — CETIRIZINE HYDROCHLORIDE 10 MG/1
TABLET ORAL
COMMUNITY

## 2019-09-24 RX ORDER — CODEINE PHOSPHATE AND GUAIFENESIN 10; 100 MG/5ML; MG/5ML
SOLUTION ORAL
Qty: 120 ML | Refills: 0 | Status: SHIPPED | OUTPATIENT
Start: 2019-09-24 | End: 2019-09-29

## 2019-09-24 RX ORDER — METHYLPREDNISOLONE 4 MG
TABLET, DOSE PACK ORAL
COMMUNITY
Start: 2017-08-28 | End: 2020-07-27

## 2019-09-24 RX ORDER — BENZONATATE 100 MG/1
100 CAPSULE ORAL 3 TIMES DAILY PRN
Qty: 21 CAPSULE | Refills: 0 | Status: SHIPPED | OUTPATIENT
Start: 2019-09-24 | End: 2019-10-01

## 2019-09-24 RX ORDER — MULTIVITAMIN COMBINATION NO.56
TABLET,CHEWABLE ORAL
COMMUNITY
End: 2020-07-27

## 2019-09-24 RX ORDER — DEXTROAMPHETAMINE SACCHARATE, AMPHETAMINE ASPARTATE MONOHYDRATE, DEXTROAMPHETAMINE SULFATE AND AMPHETAMINE SULFATE 5; 5; 5; 5 MG/1; MG/1; MG/1; MG/1
CAPSULE, EXTENDED RELEASE ORAL
COMMUNITY
Start: 2017-08-18 | End: 2020-07-27

## 2019-09-24 RX ORDER — DEXTROAMPHETAMINE SACCHARATE, AMPHETAMINE ASPARTATE MONOHYDRATE, DEXTROAMPHETAMINE SULFATE AND AMPHETAMINE SULFATE 5; 5; 5; 5 MG/1; MG/1; MG/1; MG/1
CAPSULE, EXTENDED RELEASE ORAL
Refills: 0 | COMMUNITY
Start: 2018-12-22 | End: 2020-07-27

## 2019-09-24 RX ORDER — IBUPROFEN 800 MG/1
800 TABLET, FILM COATED ORAL
COMMUNITY
Start: 2017-08-28 | End: 2020-07-27

## 2019-09-24 NOTE — PROGRESS NOTES
SUBJECTIVE: Nel Durán is a 34 year old female presenting with a chief complaint of cough  and chest pain.  Onset of symptoms was day(s) ago.  Course of illness is worsening.    Severity moderate  Current and Associated symptoms: runny nose, stuffy nose and cough - non-productive  Treatment measures tried include Tylenol/Ibuprofen and Inhaler (name: alb).  Predisposing factors include None.    Past Medical History:   Diagnosis Date     ADD (attention deficit disorder)      ASCUS favor benign 2/2014    neg hpv cotest in 3 yrs     depression 2000    Not currently on meds     History of blood transfusion 4/2013    6 units after hysterectomy     Menarche age 12     Pelvic pain      Allergies   Allergen Reactions     No Known Drug Allergy      Social History     Tobacco Use     Smoking status: Never Smoker     Smokeless tobacco: Never Used   Substance Use Topics     Alcohol use: No       ROS:  SKIN: no rash  GI: no vomiting    OBJECTIVE:  BP (!) 151/80 (BP Location: Left arm, Patient Position: Sitting, Cuff Size: Adult Regular)   Pulse 94   Temp 98.9  F (37.2  C) (Oral)   Resp 18   Wt 82.1 kg (181 lb)   LMP 03/09/2013   SpO2 100%   BMI 30.59 kg/m  GENERAL APPEARANCE: healthy, alert and no distress  EYES: EOMI,  PERRL, conjunctiva clear  HENT: ear canals and TM's normal.  Nose and mouth without ulcers, erythema or lesions  NECK: supple, nontender, no lymphadenopathy  RESP: lungs clear to auscultation - no rales, rhonchi or wheezes  CV: regular rates and rhythm, normal S1 S2, no murmur noted  SKIN: no suspicious lesions or rashes    Xray without acute findings, no pneumonia read by Yoseph Carney D.O.      ICD-10-CM    1. Acute bronchitis, unspecified organism J20.9 guaiFENesin-codeine (ROBITUSSIN AC) 100-10 MG/5ML solution     benzonatate (TESSALON) 100 MG capsule     predniSONE (DELTASONE) 20 MG tablet   2. Chest pain, unspecified type R07.9 XR Chest 2 Views     predniSONE (DELTASONE) 20 MG tablet   3.  Fever and chills R50.9 XR Chest 2 Views     Cont inhaler  Fluids/Rest, f/u if worse/not any better

## 2019-11-07 ENCOUNTER — HEALTH MAINTENANCE LETTER (OUTPATIENT)
Age: 34
End: 2019-11-07

## 2020-02-19 ENCOUNTER — TRANSFERRED RECORDS (OUTPATIENT)
Dept: HEALTH INFORMATION MANAGEMENT | Facility: CLINIC | Age: 35
End: 2020-02-19

## 2020-07-27 ENCOUNTER — VIRTUAL VISIT (OUTPATIENT)
Dept: FAMILY MEDICINE | Facility: CLINIC | Age: 35
End: 2020-07-27
Payer: COMMERCIAL

## 2020-07-27 VITALS — WEIGHT: 268 LBS | BODY MASS INDEX: 45.29 KG/M2

## 2020-07-27 DIAGNOSIS — F90.2 ATTENTION DEFICIT HYPERACTIVITY DISORDER (ADHD), COMBINED TYPE: Primary | ICD-10-CM

## 2020-07-27 PROCEDURE — 99213 OFFICE O/P EST LOW 20 MIN: CPT | Mod: 95 | Performed by: PHYSICIAN ASSISTANT

## 2020-07-27 RX ORDER — DEXTROAMPHETAMINE SACCHARATE, AMPHETAMINE ASPARTATE, DEXTROAMPHETAMINE SULFATE AND AMPHETAMINE SULFATE 5; 5; 5; 5 MG/1; MG/1; MG/1; MG/1
20 TABLET ORAL DAILY
Qty: 30 TABLET | Refills: 0 | Status: SHIPPED | OUTPATIENT
Start: 2020-07-27 | End: 2020-09-23

## 2020-07-27 RX ORDER — DEXTROAMPHETAMINE SACCHARATE, AMPHETAMINE ASPARTATE MONOHYDRATE, DEXTROAMPHETAMINE SULFATE AND AMPHETAMINE SULFATE 7.5; 7.5; 7.5; 7.5 MG/1; MG/1; MG/1; MG/1
30 CAPSULE, EXTENDED RELEASE ORAL EVERY MORNING
Qty: 30 CAPSULE | Refills: 0 | Status: SHIPPED | OUTPATIENT
Start: 2020-07-27 | End: 2020-09-23

## 2020-07-27 ASSESSMENT — PATIENT HEALTH QUESTIONNAIRE - PHQ9: SUM OF ALL RESPONSES TO PHQ QUESTIONS 1-9: 4

## 2020-07-27 NOTE — PROGRESS NOTES
"Nel Durán is a 35 year old female who is being evaluated via a billable video visit.      The patient has been notified of following:     \"This video visit will be conducted via a call between you and your physician/provider. We have found that certain health care needs can be provided without the need for an in-person physical exam.  This service lets us provide the care you need with a video conversation.  If a prescription is necessary we can send it directly to your pharmacy.  If lab work is needed we can place an order for that and you can then stop by our lab to have the test done at a later time.    Video visits are billed at different rates depending on your insurance coverage.  Please reach out to your insurance provider with any questions.    If during the course of the call the physician/provider feels a video visit is not appropriate, you will not be charged for this service.\"    Patient has given verbal consent for Video visit? Yes  How would you like to obtain your AVS? MyChart  If you are dropped from the video visit, the video invite should be resent to:   Will anyone else be joining your video visit? No    Subjective     Nel Durán is a 35 year old female who presents today via video visit for the following health issues:    HPI    New Patient/Transfer of Care  ADHD    How many servings of fruits and vegetables do you eat daily?  4 or more    On average, how many sweetened beverages do you drink each day (Examples: soda, juice, sweet tea, etc.  Do NOT count diet or artificially sweetened beverages)?   0    How many days per week do you exercise enough to make your heart beat faster? 5    How many minutes a day do you exercise enough to make your heart beat faster? 30 - 60    How many days per week do you miss taking your medication? 0         Video Start Time: 12:04 PM    36 y/o female to discuss PCP taking over her adderall rx.  She has followed with another provider for " many years, and she has found out that he has retired and closed clinic.  Was a small independent clinic, so no other provider to take over.  She has been stable on adderall xr 30 mg daily along with IR 20 mg daily.  Denies any side effects, and continues to work well.    BP Readings from Last 3 Encounters:   09/24/19 (!) 151/80   04/22/19 135/89   03/27/18 109/65    Wt Readings from Last 3 Encounters:   07/27/20 121.6 kg (268 lb)   09/24/19 82.1 kg (181 lb)   04/22/19 82.4 kg (181 lb 9.6 oz)                    Reviewed and updated as needed this visit by Provider         Review of Systems   Constitutional, HEENT, cardiovascular, pulmonary, gi and gu systems are negative, except as otherwise noted.      Objective             Physical Exam     GENERAL: alert and no distress  EYES: Eyes grossly normal to inspection.  No discharge or erythema, or obvious scleral/conjunctival abnormalities.  RESP: No audible wheeze, cough, or visible cyanosis.  No visible retractions or increased work of breathing.    SKIN: Visible skin clear. No significant rash, abnormal pigmentation or lesions.  NEURO: Cranial nerves grossly intact.  Mentation and speech appropriate for age.  PSYCH: Mentation appears normal, affect normal/bright, judgement and insight intact, normal speech and appearance well-groomed.      Diagnostic Test Results:  Labs reviewed in Epic        Assessment & Plan     1. Attention deficit hyperactivity disorder (ADHD), combined type  She is going to get records transferred.  Explained refill policy/  - amphetamine-dextroamphetamine (ADDERALL XR) 30 MG 24 hr capsule; Take 1 capsule (30 mg) by mouth every morning  Dispense: 30 capsule; Refill: 0  - amphetamine-dextroamphetamine (ADDERALL) 20 MG tablet; Take 1 tablet (20 mg) by mouth daily  Dispense: 30 tablet; Refill: 0           Return in about 4 weeks (around 8/24/2020) for e-visit/telephone.    Jovanny Zayas PA-C  [facility]      Video-Visit  Details    Type of service:  Video Visit    Video End Time:12:14 PM    Originating Location (pt. Location): Home    Distant Location (provider location):  [facility]     Platform used for Video Visit: Nieves Elkins in about 4 weeks (around 8/24/2020) for e-visit/telephone.       Jovanny Zayas PA-C

## 2020-09-14 ENCOUNTER — MYC REFILL (OUTPATIENT)
Dept: FAMILY MEDICINE | Facility: CLINIC | Age: 35
End: 2020-09-14

## 2020-09-14 ENCOUNTER — E-VISIT (OUTPATIENT)
Dept: FAMILY MEDICINE | Facility: CLINIC | Age: 35
End: 2020-09-14
Payer: COMMERCIAL

## 2020-09-14 ENCOUNTER — MYC MEDICAL ADVICE (OUTPATIENT)
Dept: FAMILY MEDICINE | Facility: CLINIC | Age: 35
End: 2020-09-14

## 2020-09-14 DIAGNOSIS — F90.2 ATTENTION DEFICIT HYPERACTIVITY DISORDER (ADHD), COMBINED TYPE: ICD-10-CM

## 2020-09-14 PROCEDURE — 99421 OL DIG E/M SVC 5-10 MIN: CPT | Performed by: PHYSICIAN ASSISTANT

## 2020-09-14 RX ORDER — DEXTROAMPHETAMINE SACCHARATE, AMPHETAMINE ASPARTATE, DEXTROAMPHETAMINE SULFATE AND AMPHETAMINE SULFATE 5; 5; 5; 5 MG/1; MG/1; MG/1; MG/1
20 TABLET ORAL DAILY
Qty: 30 TABLET | Refills: 0 | Status: CANCELLED | OUTPATIENT
Start: 2020-09-14

## 2020-09-14 RX ORDER — DEXTROAMPHETAMINE SACCHARATE, AMPHETAMINE ASPARTATE MONOHYDRATE, DEXTROAMPHETAMINE SULFATE AND AMPHETAMINE SULFATE 7.5; 7.5; 7.5; 7.5 MG/1; MG/1; MG/1; MG/1
30 CAPSULE, EXTENDED RELEASE ORAL EVERY MORNING
Qty: 30 CAPSULE | Refills: 0 | Status: CANCELLED | OUTPATIENT
Start: 2020-09-14

## 2020-09-23 RX ORDER — DEXTROAMPHETAMINE SACCHARATE, AMPHETAMINE ASPARTATE MONOHYDRATE, DEXTROAMPHETAMINE SULFATE AND AMPHETAMINE SULFATE 7.5; 7.5; 7.5; 7.5 MG/1; MG/1; MG/1; MG/1
30 CAPSULE, EXTENDED RELEASE ORAL EVERY MORNING
Qty: 30 CAPSULE | Refills: 0 | Status: SHIPPED | OUTPATIENT
Start: 2020-09-23 | End: 2020-12-03

## 2020-09-23 RX ORDER — DEXTROAMPHETAMINE SACCHARATE, AMPHETAMINE ASPARTATE, DEXTROAMPHETAMINE SULFATE AND AMPHETAMINE SULFATE 5; 5; 5; 5 MG/1; MG/1; MG/1; MG/1
20 TABLET ORAL DAILY
Qty: 30 TABLET | Refills: 0 | Status: SHIPPED | OUTPATIENT
Start: 2020-09-23 | End: 2020-12-03

## 2020-11-23 ENCOUNTER — MYC MEDICAL ADVICE (OUTPATIENT)
Dept: FAMILY MEDICINE | Facility: CLINIC | Age: 35
End: 2020-11-23

## 2020-11-29 ENCOUNTER — MYC REFILL (OUTPATIENT)
Dept: FAMILY MEDICINE | Facility: CLINIC | Age: 35
End: 2020-11-29

## 2020-11-29 ENCOUNTER — HEALTH MAINTENANCE LETTER (OUTPATIENT)
Age: 35
End: 2020-11-29

## 2020-11-29 DIAGNOSIS — F90.2 ATTENTION DEFICIT HYPERACTIVITY DISORDER (ADHD), COMBINED TYPE: ICD-10-CM

## 2020-11-29 RX ORDER — DEXTROAMPHETAMINE SACCHARATE, AMPHETAMINE ASPARTATE, DEXTROAMPHETAMINE SULFATE AND AMPHETAMINE SULFATE 5; 5; 5; 5 MG/1; MG/1; MG/1; MG/1
20 TABLET ORAL DAILY
Qty: 30 TABLET | Refills: 0 | Status: CANCELLED | OUTPATIENT
Start: 2020-11-29

## 2020-11-29 RX ORDER — DEXTROAMPHETAMINE SACCHARATE, AMPHETAMINE ASPARTATE MONOHYDRATE, DEXTROAMPHETAMINE SULFATE AND AMPHETAMINE SULFATE 7.5; 7.5; 7.5; 7.5 MG/1; MG/1; MG/1; MG/1
30 CAPSULE, EXTENDED RELEASE ORAL EVERY MORNING
Qty: 30 CAPSULE | Refills: 0 | Status: CANCELLED | OUTPATIENT
Start: 2020-11-29

## 2020-12-01 ENCOUNTER — MYC MEDICAL ADVICE (OUTPATIENT)
Dept: FAMILY MEDICINE | Facility: CLINIC | Age: 35
End: 2020-12-01

## 2020-12-03 ENCOUNTER — OFFICE VISIT (OUTPATIENT)
Dept: FAMILY MEDICINE | Facility: CLINIC | Age: 35
End: 2020-12-03
Payer: COMMERCIAL

## 2020-12-03 VITALS
BODY MASS INDEX: 28.32 KG/M2 | HEART RATE: 72 BPM | WEIGHT: 170 LBS | SYSTOLIC BLOOD PRESSURE: 120 MMHG | DIASTOLIC BLOOD PRESSURE: 78 MMHG | HEIGHT: 65 IN

## 2020-12-03 DIAGNOSIS — H53.8 BLURRED VISION: Primary | ICD-10-CM

## 2020-12-03 DIAGNOSIS — R47.89 ALTERATION IN SPEECH: ICD-10-CM

## 2020-12-03 DIAGNOSIS — R26.89 LOSS OF BALANCE: ICD-10-CM

## 2020-12-03 DIAGNOSIS — F90.2 ATTENTION DEFICIT HYPERACTIVITY DISORDER (ADHD), COMBINED TYPE: ICD-10-CM

## 2020-12-03 PROCEDURE — 99214 OFFICE O/P EST MOD 30 MIN: CPT | Performed by: PHYSICIAN ASSISTANT

## 2020-12-03 RX ORDER — PNV NO.95/FERROUS FUM/FOLIC AC 28MG-0.8MG
1 TABLET ORAL DAILY
COMMUNITY

## 2020-12-03 RX ORDER — METHYLPHENIDATE HYDROCHLORIDE 20 MG/1
20 CAPSULE, EXTENDED RELEASE ORAL DAILY
Qty: 30 CAPSULE | Refills: 0 | Status: SHIPPED | OUTPATIENT
Start: 2020-12-03 | End: 2021-04-12

## 2020-12-03 ASSESSMENT — MIFFLIN-ST. JEOR: SCORE: 1454.05

## 2020-12-03 NOTE — PROGRESS NOTES
"Subjective     Nel Durán is a 35 year old female who presents to clinic today for the following health issues:    HPI         Medication Followup of Adderall    Taking Medication as prescribed: yes    Side Effects:  None    Medication Helping Symptoms:  yes     Patient would also like to get a referral for Neurology due to vision changes, speech changes, balance issues, joint pain, bladder leakage. Her coworker has even started to see these changes in her.    No family history of MS or other neuro conditions, but she does worry.  The timing could be side effect of adderall, so she would polo to switch off of that.    She does have vision exam coming up already.    Review of Systems   Constitutional, HEENT, cardiovascular, pulmonary, gi and gu systems are negative, except as otherwise noted.      Objective    /78   Pulse 72   Ht 1.638 m (5' 4.5\")   Wt 77.1 kg (170 lb)   LMP 03/09/2013   BMI 28.73 kg/m    Body mass index is 28.73 kg/m .  Physical Exam   GENERAL: alert and no distress  NECK: no adenopathy, no asymmetry, masses, or scars and thyroid normal to palpation  RESP: lungs clear to auscultation - no rales, rhonchi or wheezes  CV: regular rate and rhythm, normal S1 S2, no S3 or S4, no murmur, click or rub, no peripheral edema and peripheral pulses strong  MS: no gross musculoskeletal defects noted, no edema  NEURO: Normal strength and tone, mentation intact and speech normal    No results found for this or any previous visit (from the past 24 hour(s)).        Assessment & Plan     Alteration in speech  Due to myriad of symptoms, further eval from neuro does make sense.  - NEUROLOGY ADULT REFERRAL    Loss of balance    - NEUROLOGY ADULT REFERRAL    Blurred vision      Attention deficit hyperactivity disorder (ADHD), combined type  Will also stop adderall and trial ritalin to see if this is playing any role.  - methylphenidate (RITALIN LA) 20 MG 24 hr capsule; Take 20 mg by mouth daily   " "  BMI:   Estimated body mass index is 28.73 kg/m  as calculated from the following:    Height as of this encounter: 1.638 m (5' 4.5\").    Weight as of this encounter: 77.1 kg (170 lb).   Weight management plan: Discussed healthy diet and exercise guidelines             No follow-ups on file.    Jovanny Zayas PA-C  [facility]    "

## 2021-02-05 ENCOUNTER — VIRTUAL VISIT (OUTPATIENT)
Dept: FAMILY MEDICINE | Facility: CLINIC | Age: 36
End: 2021-02-05
Payer: COMMERCIAL

## 2021-02-05 DIAGNOSIS — F90.2 ATTENTION DEFICIT HYPERACTIVITY DISORDER (ADHD), COMBINED TYPE: Primary | ICD-10-CM

## 2021-02-05 PROCEDURE — 99213 OFFICE O/P EST LOW 20 MIN: CPT | Mod: 95 | Performed by: PHYSICIAN ASSISTANT

## 2021-02-05 RX ORDER — METHYLPHENIDATE HYDROCHLORIDE 30 MG/1
30 CAPSULE, EXTENDED RELEASE ORAL EVERY MORNING
Qty: 30 CAPSULE | Refills: 0 | Status: SHIPPED | OUTPATIENT
Start: 2021-02-05 | End: 2021-07-26

## 2021-02-05 ASSESSMENT — PATIENT HEALTH QUESTIONNAIRE - PHQ9
SUM OF ALL RESPONSES TO PHQ QUESTIONS 1-9: 4
SUM OF ALL RESPONSES TO PHQ QUESTIONS 1-9: 4
10. IF YOU CHECKED OFF ANY PROBLEMS, HOW DIFFICULT HAVE THESE PROBLEMS MADE IT FOR YOU TO DO YOUR WORK, TAKE CARE OF THINGS AT HOME, OR GET ALONG WITH OTHER PEOPLE: SOMEWHAT DIFFICULT

## 2021-02-05 NOTE — NURSING NOTE
"Chief Complaint   Patient presents with     Recheck Medication     Ritalin     initial LMP 03/09/2013  Estimated body mass index is 28.73 kg/m  as calculated from the following:    Height as of 12/3/20: 1.638 m (5' 4.5\").    Weight as of 12/3/20: 77.1 kg (170 lb).  BP completed using cuff size: .  L  R arm      Health Maintenance that is potentially due pending provider review:      Ulices Hernandez ma  "

## 2021-02-05 NOTE — PROGRESS NOTES
Nel is a 35 year old who is being evaluated via a billable video visit.      How would you like to obtain your AVS? MyChart  If the video visit is dropped, the invitation should be resent by:   Will anyone else be joining your video visit? No    Video Start Time: 849  Assessment & Plan     Attention deficit hyperactivity disorder (ADHD), combined type    - methylphenidate (RITALIN LA) 30 MG 24 hr capsule; Take 1 capsule (30 mg) by mouth every morning                 No follow-ups on file.    Jovanny Zayas PA-C  [facility]     Nel is a 35 year old who presents to clinic today for the following health issues     HPI       Medication Followup of Ritalin    Taking Medication as prescribed: yes    Side Effects:  None    Medication Helping Symptoms:  yes     At our last visit we did switch to ritalin from adderall.  She does think this has been more beneficial, although she does wonder if dose could be adjusted up a bit.  No real side effects.    Review of Systems   Constitutional, HEENT, cardiovascular, pulmonary, gi and gu systems are negative, except as otherwise noted.      Objective    Vitals - Patient Reported  Weight (Patient Reported): 77.1 kg (170 lb)        Physical Exam   GENERAL: alert and no distress  EYES: Eyes grossly normal to inspection.  No discharge or erythema, or obvious scleral/conjunctival abnormalities.  RESP: No audible wheeze, cough, or visible cyanosis.  No visible retractions or increased work of breathing.    SKIN: Visible skin clear. No significant rash, abnormal pigmentation or lesions.  NEURO: Cranial nerves grossly intact.  Mentation and speech appropriate for age.  PSYCH: Mentation appears normal, affect normal/bright, judgement and insight intact, normal speech and appearance well-groomed.                Video-Visit Details    Type of service:  Video Visit    Video End Time:906    Originating Location (pt. Location): Home    Distant  Location (provider location):  [facility]     Platform used for Video Visit: Nieves  Answers for HPI/ROS submitted by the patient on 2/5/2021   If you checked off any problems, how difficult have these problems made it for you to do your work, take care of things at home, or get along with other people?: Somewhat difficult  PHQ9 TOTAL SCORE: 4

## 2021-04-09 NOTE — PROGRESS NOTES
Nel is a 36 year old who is being evaluated via a billable video visit.      How would you like to obtain your AVS? MyChart  If the video visit is dropped, the invitation should be resent by: Text to cell phone: [phone]  Will anyone else be joining your video visit? No    Video Start Time: 8:21 AM    Assessment & Plan     Attention deficit hyperactivity disorder (ADHD), combined type  Working well, will continue current dose.  - methylphenidate (RITALIN LA) 30 MG 24 hr capsule; Take 1 capsule (30 mg) by mouth daily  - methylphenidate (RITALIN LA) 30 MG 24 hr capsule; Take 1 capsule (30 mg) by mouth daily  - methylphenidate (RITALIN LA) 30 MG 24 hr capsule; Take 1 capsule (30 mg) by mouth daily    CONI (generalized anxiety disorder)  She has not done well with selective serotonin reuptake inhibitor or wellbutrin in the past.  She does feel that she does need something to help, and is interested in buspar.  Will also set up referral to psych in case this does not get her to goal.  - MENTAL HEALTH REFERRAL  - Adult; Psychiatry; Psychiatry; Other: [facility] [phone]; We will contact you to schedule the appointment or please call with any questions  - busPIRone (BUSPAR) 5 MG tablet; 5 mg PO every day x 3 days; 5 mg PO BID x 3 days; 10 mg PO q am, 5 mg PO q hs x 3 days; 10 mg BID    Chronic idiopathic pain syndrome  Had discussion about pain management.  She has had multiple visit with specialists through the years, without any known cause.  Going gluten free seems to have made those most beneft, but she still get 1-2 days a month where pain is unbearable.  She has used toradol injection at work, does work very well.  Wonders about oral options.  She only thinks she would need 2 doses a month.  - ketorolac (TORADOL) 10 MG tablet; Take 1 tablet (10 mg) by mouth daily as needed for moderate pain                 Return in about 4 weeks (around 5/10/2021).    [facility]  [facility] [place] is a 36 year old who presents for the following health issues     HPI     ADHD Follow-Up    Date of last ADHD office visit: 02/05/2021  Status since last visit: Stable  Taking controlled (daily) medications as prescribed: Yes                       Parent/Patient Concerns with Medications: None  ADHD Medication     Stimulants - Misc. Disp Start End     methylphenidate (RITALIN LA) 30 MG 24 hr capsule    30 capsule 2/5/2021     Sig - Route: Take 1 capsule (30 mg) by mouth every morning - Oral    Class: E-Prescribe    Earliest Fill Date: 2/5/2021          School:      Sleep: pt states she has problems sleeping but not related to medication it's more life.   Home/Family Concerns: None  Peer Concerns: None    Co-Morbid Diagnosis: anxiety    Currently in counseling: Yes        Medication Benefits:   Controlled symptoms: Distractability and Finishing tasks  Uncontrolled Symptoms: Frustration tolerance    Medication side effects:  Side effects noted: none  Denies: appetite suppression and weight loss    Pain History:  When did you first notice your pain? - More than 6 weeks   Have you seen this provider for your pain in the past?   Yes   Where in your body do you have pain? widespread  Are you seeing anyone else for your pain? No    PHQ-9 SCORE 4/22/2019 7/27/2020 2/5/2021   PHQ-9 Total Score - - -   PHQ-9 Total Score MyChart - - 4 (Minimal depression)   PHQ-9 Total Score 19 4 4               Chronic Pain Follow Up:    Location of pain: widespread  Analgesia/pain control:    - Recent changes:  Flares, unknown cause    - Overall control: Tolerable with discomfort    - Current treatments: has used toradol shots through work, have been only thing that have helped   Adherence:     - Do you ever take more pain medicine than prescribed? Yes: as above    - When did you take your last dose of pain medicine?  Last month   Adverse effects: No   Activity (PEG): No flowsheet data  found.  PDMP Review       Value Time User    State PDMP site checked  Yes 2/5/2021  8:54 AM Jovanny Zayas PA-C        Last CSA Agreement:   Patient-Level CSA:    There are no patient-level csa.       No flowsheet data found.    Anxiety Follow-Up    How are you doing with your anxiety since your last visit? Worsened     Are you having other symptoms that might be associated with anxiety? No    Have you had a significant life event? No     Are you feeling depressed? No    Do you have any concerns with your use of alcohol or other drugs? No    Social History     Tobacco Use     Smoking status: Never Smoker     Smokeless tobacco: Never Used   Substance Use Topics     Alcohol use: No     Drug use: No     CONI-7 SCORE 2/22/2018 4/22/2019   Total Score 3 20     PHQ 4/22/2019 7/27/2020 2/5/2021   PHQ-9 Total Score 19 4 4   Q9: Thoughts of better off dead/self-harm past 2 weeks Not at all Not at all Not at all             Review of Systems   Constitutional, HEENT, cardiovascular, pulmonary, gi and gu systems are negative, except as otherwise noted.      Objective           Vitals:  No vitals were obtained today due to virtual visit.    Physical Exam   GENERAL: Healthy, alert and no distress  EYES: Eyes grossly normal to inspection.  No discharge or erythema, or obvious scleral/conjunctival abnormalities.  RESP: No audible wheeze, cough, or visible cyanosis.  No visible retractions or increased work of breathing.    SKIN: Visible skin clear. No significant rash, abnormal pigmentation or lesions.  NEURO: Cranial nerves grossly intact.  Mentation and speech appropriate for age.  PSYCH: Mentation appears normal, affect normal/bright, judgement and insight intact, normal speech and appearance well-groomed.                Video-Visit Details    Type of service:  Video Visit    Video End Time:8:38 AM    Originating Location (pt. Location): Home    Distant Location (provider location):  [facility]      Platform used for Video Visit: Nieves

## 2021-04-12 ENCOUNTER — VIRTUAL VISIT (OUTPATIENT)
Dept: FAMILY MEDICINE | Facility: CLINIC | Age: 36
End: 2021-04-12
Payer: COMMERCIAL

## 2021-04-12 DIAGNOSIS — F90.2 ATTENTION DEFICIT HYPERACTIVITY DISORDER (ADHD), COMBINED TYPE: Primary | ICD-10-CM

## 2021-04-12 DIAGNOSIS — F41.1 GAD (GENERALIZED ANXIETY DISORDER): ICD-10-CM

## 2021-04-12 DIAGNOSIS — G89.29 CHRONIC IDIOPATHIC PAIN SYNDROME: ICD-10-CM

## 2021-04-12 PROCEDURE — 99214 OFFICE O/P EST MOD 30 MIN: CPT | Mod: 95 | Performed by: PHYSICIAN ASSISTANT

## 2021-04-12 RX ORDER — BUSPIRONE HYDROCHLORIDE 5 MG/1
TABLET ORAL
Qty: 60 TABLET | Refills: 0 | Status: SHIPPED | OUTPATIENT
Start: 2021-04-12 | End: 2021-07-26

## 2021-04-12 RX ORDER — METHYLPHENIDATE HYDROCHLORIDE 30 MG/1
30 CAPSULE, EXTENDED RELEASE ORAL DAILY
Qty: 30 CAPSULE | Refills: 0 | Status: SHIPPED | OUTPATIENT
Start: 2021-06-13 | End: 2021-07-13

## 2021-04-12 RX ORDER — METHYLPHENIDATE HYDROCHLORIDE 30 MG/1
30 CAPSULE, EXTENDED RELEASE ORAL DAILY
Qty: 30 CAPSULE | Refills: 0 | Status: SHIPPED | OUTPATIENT
Start: 2021-04-12 | End: 2021-05-12

## 2021-04-12 RX ORDER — KETOROLAC TROMETHAMINE 10 MG/1
10 TABLET, FILM COATED ORAL DAILY PRN
Qty: 2 TABLET | Refills: 0 | Status: ON HOLD | OUTPATIENT
Start: 2021-04-12 | End: 2022-01-02

## 2021-04-12 RX ORDER — METHYLPHENIDATE HYDROCHLORIDE 30 MG/1
30 CAPSULE, EXTENDED RELEASE ORAL DAILY
Qty: 30 CAPSULE | Refills: 0 | Status: SHIPPED | OUTPATIENT
Start: 2021-05-13 | End: 2021-06-12

## 2021-07-26 ENCOUNTER — VIRTUAL VISIT (OUTPATIENT)
Dept: FAMILY MEDICINE | Facility: CLINIC | Age: 36
End: 2021-07-26
Payer: COMMERCIAL

## 2021-07-26 DIAGNOSIS — F90.2 ATTENTION DEFICIT HYPERACTIVITY DISORDER (ADHD), COMBINED TYPE: ICD-10-CM

## 2021-07-26 PROCEDURE — 99213 OFFICE O/P EST LOW 20 MIN: CPT | Mod: 95 | Performed by: PHYSICIAN ASSISTANT

## 2021-07-26 RX ORDER — METHYLPHENIDATE HYDROCHLORIDE 20 MG/1
20 CAPSULE, EXTENDED RELEASE ORAL DAILY
Qty: 30 CAPSULE | Refills: 0 | Status: SHIPPED | OUTPATIENT
Start: 2021-07-26 | End: 2021-08-25

## 2021-07-26 RX ORDER — METHYLPHENIDATE HYDROCHLORIDE 20 MG/1
20 CAPSULE, EXTENDED RELEASE ORAL DAILY
Qty: 30 CAPSULE | Refills: 0 | Status: SHIPPED | OUTPATIENT
Start: 2021-09-26 | End: 2021-10-26

## 2021-07-26 RX ORDER — METHYLPHENIDATE HYDROCHLORIDE 30 MG/1
30 CAPSULE, EXTENDED RELEASE ORAL EVERY MORNING
Qty: 30 CAPSULE | Refills: 0 | Status: CANCELLED | OUTPATIENT
Start: 2021-07-26

## 2021-07-26 RX ORDER — METHYLPHENIDATE HYDROCHLORIDE 20 MG/1
20 CAPSULE, EXTENDED RELEASE ORAL DAILY
Qty: 30 CAPSULE | Refills: 0 | Status: SHIPPED | OUTPATIENT
Start: 2021-08-26 | End: 2021-09-25

## 2021-07-26 ASSESSMENT — PATIENT HEALTH QUESTIONNAIRE - PHQ9
10. IF YOU CHECKED OFF ANY PROBLEMS, HOW DIFFICULT HAVE THESE PROBLEMS MADE IT FOR YOU TO DO YOUR WORK, TAKE CARE OF THINGS AT HOME, OR GET ALONG WITH OTHER PEOPLE: NOT DIFFICULT AT ALL
SUM OF ALL RESPONSES TO PHQ QUESTIONS 1-9: 2
SUM OF ALL RESPONSES TO PHQ QUESTIONS 1-9: 2

## 2021-07-26 NOTE — PROGRESS NOTES
"Nel is a 36 year old who is being evaluated via a billable video visit.      How would you like to obtain your AVS? MyChart  If the video visit is dropped, the invitation should be resent by: Text to cell phone: [phone]  Will anyone else be joining your video visit? No    Video Start Time: 5:43 PM    Assessment & Plan     Attention deficit hyperactivity disorder (ADHD), combined type  Doing well, does not take every day.  Did not start the buspar, feels like anxiety is ok, and completely situational.  - methylphenidate (RITALIN LA) 20 MG 24 hr capsule; Take 20 mg by mouth daily  - methylphenidate (RITALIN LA) 20 MG 24 hr capsule; Take 20 mg by mouth daily  - methylphenidate (RITALIN LA) 20 MG 24 hr capsule; Take 20 mg by mouth daily        Discussed that she is overdue for physical/pap.  She will look to schedule.     BMI:   Estimated body mass index is 28.73 kg/m  as calculated from the following:    Height as of 12/3/20: 1.638 m (5' 4.5\").    Weight as of 12/3/20: 77.1 kg (170 lb).           Return in about 6 months (around 1/26/2022).    Jovanny Zayas PA-C  [facility]   Nel is a 36 year old who presents for the following health issues     HPI     Answers for HPI/ROS submitted by the patient on 7/26/2021  If you checked off any problems, how difficult have these problems made it for you to do your work, take care of things at home, or get along with other people?: Not difficult at all  PHQ9 TOTAL SCORE: 2        ADHD Follow-Up    Date of last ADHD office visit: 4/12/2021  Status since last visit: Stable  Taking controlled (daily) medications as prescribed: Yes                       Parent/Patient Concerns with Medications: None  ADHD Medication     Stimulants - Misc. Disp Start End     methylphenidate (RITALIN LA) 30 MG 24 hr capsule    30 capsule 2/5/2021     Sig - Route: Take 1 capsule (30 mg) by mouth every morning - Oral    Class: E-Prescribe    " Earliest Fill Date: 2/5/2021        Sleep: no problems  Home/Family Concerns: Improving  Peer Concerns: Stable    Co-Morbid Diagnosis: Anxiety    Currently in counseling: Yes        Medication Benefits:   Controlled symptoms: Distractability and Finishing tasks  Uncontrolled Symptoms: None    Medication side effects:  Side effects noted: none  Denies: weight loss and insomnia            Review of Systems   Constitutional, HEENT, cardiovascular, pulmonary, gi and gu systems are negative, except as otherwise noted.      Objective           Vitals:  No vitals were obtained today due to virtual visit.    Physical Exam   GENERAL: Healthy, alert and no distress  EYES: Eyes grossly normal to inspection.  No discharge or erythema, or obvious scleral/conjunctival abnormalities.  RESP: No audible wheeze, cough, or visible cyanosis.  No visible retractions or increased work of breathing.    SKIN: Visible skin clear. No significant rash, abnormal pigmentation or lesions.  NEURO: Cranial nerves grossly intact.  Mentation and speech appropriate for age.  PSYCH: Mentation appears normal, affect normal/bright, judgement and insight intact, normal speech and appearance well-groomed.                Video-Visit Details    Type of service:  Video Visit    Video End Time:5:52 PM    Originating Location (pt. Location): Home    Distant Location (provider location):  [facility]     Platform used for Video Visit: [employer]

## 2021-09-25 ENCOUNTER — HEALTH MAINTENANCE LETTER (OUTPATIENT)
Age: 36
End: 2021-09-25

## 2021-10-26 ENCOUNTER — E-VISIT (OUTPATIENT)
Dept: URGENT CARE | Facility: CLINIC | Age: 36
End: 2021-10-26
Payer: COMMERCIAL

## 2021-10-26 DIAGNOSIS — F90.2 ATTENTION DEFICIT HYPERACTIVITY DISORDER (ADHD), COMBINED TYPE: Primary | ICD-10-CM

## 2021-10-26 PROCEDURE — 99421 OL DIG E/M SVC 5-10 MIN: CPT

## 2021-10-26 RX ORDER — METHYLPHENIDATE HYDROCHLORIDE 20 MG/1
20 CAPSULE, EXTENDED RELEASE ORAL DAILY
Qty: 30 CAPSULE | Refills: 0 | Status: SHIPPED | OUTPATIENT
Start: 2021-10-26 | End: 2022-07-08 | Stop reason: DRUGHIGH

## 2021-12-02 NOTE — PROGRESS NOTES
Nel is a 36 year old who is being evaluated via a billable video visit.      How would you like to obtain your AVS? MyChart  If the video visit is dropped, the invitation should be resent by: Text to cell phone: [phone]  Will anyone else be joining your video visit? No    Video Start Time: 11:35 AM    Assessment & Plan     Attention deficit hyperactivity disorder (ADHD), combined type  Doing well, no concerns.  Is due for pap/pelvic, plans to follow up with GYN  - methylphenidate (RITALIN LA) 20 MG 24 hr capsule; Take 20 mg by mouth daily  - methylphenidate (RITALIN LA) 20 MG 24 hr capsule; Take 20 mg by mouth daily  - methylphenidate (RITALIN LA) 20 MG 24 hr capsule; Take 20 mg by mouth daily                 Return in about 6 months (around 6/6/2022).    Jovanny Zayas PA-C  [facility]   Nel is a 36 year old who presents for the following health issues     History of Present Illness       She eats 4 or more servings of fruits and vegetables daily.She consumes 0 sweetened beverage(s) daily.She exercises with enough effort to increase her heart rate 60 or more minutes per day.  She exercises with enough effort to increase her heart rate 4 days per week. She is missing 2 dose(s) of medications per week.  She is not taking prescribed medications regularly due to remembering to take and other.       ADHD Follow-Up    Date of last ADHD office visit: 07/26/2021  Status since last visit: Stable  Taking controlled (daily) medications as prescribed: Yes                       Parent/Patient Concerns with Medications: None  ADHD Medication     Stimulants - Misc. Disp Start End     methylphenidate (RITALIN LA) 20 MG 24 hr capsule    30 capsule 10/26/2021     Sig - Route: Take 20 mg by mouth daily - Oral    Class: E-Prescribe    Earliest Fill Date: 10/26/2021          School:    Sleep: no problems  Home/Family Concerns: None  Peer Concerns: None    Co-Morbid Diagnosis:  None    Currently in counseling: No        Medication Benefits:   Controlled symptoms: Attention span, Distractability and Finishing tasks  Uncontrolled Symptoms: None    Medication side effects:  Side effects noted: none  Denies: appetite suppression, weight loss and insomnia            Review of Systems   Constitutional, HEENT, cardiovascular, pulmonary, gi and gu systems are negative, except as otherwise noted.      Objective           Vitals:  No vitals were obtained today due to virtual visit.    Physical Exam   GENERAL: Healthy, alert and no distress  EYES: Eyes grossly normal to inspection.  No discharge or erythema, or obvious scleral/conjunctival abnormalities.  RESP: No audible wheeze, cough, or visible cyanosis.  No visible retractions or increased work of breathing.    SKIN: Visible skin clear. No significant rash, abnormal pigmentation or lesions.  NEURO: Cranial nerves grossly intact.  Mentation and speech appropriate for age.  PSYCH: Mentation appears normal, affect normal/bright, judgement and insight intact, normal speech and appearance well-groomed.                Video-Visit Details    Type of service:  Video Visit    Video End Time:11:42 AM    Originating Location (pt. Location): Home    Distant Location (provider location):  [facility]     Platform used for Video Visit: [employer]an[employer]

## 2021-12-06 ENCOUNTER — VIRTUAL VISIT (OUTPATIENT)
Dept: FAMILY MEDICINE | Facility: CLINIC | Age: 36
End: 2021-12-06
Payer: COMMERCIAL

## 2021-12-06 DIAGNOSIS — F90.2 ATTENTION DEFICIT HYPERACTIVITY DISORDER (ADHD), COMBINED TYPE: Primary | ICD-10-CM

## 2021-12-06 PROCEDURE — 99213 OFFICE O/P EST LOW 20 MIN: CPT | Mod: 95 | Performed by: PHYSICIAN ASSISTANT

## 2021-12-06 RX ORDER — METHYLPHENIDATE HYDROCHLORIDE 20 MG/1
20 CAPSULE, EXTENDED RELEASE ORAL DAILY
Qty: 30 CAPSULE | Refills: 0 | Status: SHIPPED | OUTPATIENT
Start: 2022-02-06 | End: 2021-12-29

## 2021-12-06 RX ORDER — METHYLPHENIDATE HYDROCHLORIDE 20 MG/1
20 CAPSULE, EXTENDED RELEASE ORAL DAILY
Qty: 30 CAPSULE | Refills: 0 | Status: SHIPPED | OUTPATIENT
Start: 2022-01-06 | End: 2021-12-29

## 2021-12-06 RX ORDER — METHYLPHENIDATE HYDROCHLORIDE 20 MG/1
20 CAPSULE, EXTENDED RELEASE ORAL DAILY
Qty: 30 CAPSULE | Refills: 0 | Status: SHIPPED | OUTPATIENT
Start: 2021-12-06 | End: 2021-12-29

## 2021-12-29 ENCOUNTER — HOSPITAL ENCOUNTER (INPATIENT)
Facility: CLINIC | Age: 36
LOS: 4 days | Discharge: HOME | End: 2022-01-02
Attending: EMERGENCY MEDICINE | Admitting: INTERNAL MEDICINE
Payer: COMMERCIAL

## 2021-12-29 DIAGNOSIS — R10.84 ABDOMINAL PAIN, GENERALIZED: ICD-10-CM

## 2021-12-29 DIAGNOSIS — N12 PYELONEPHRITIS: ICD-10-CM

## 2021-12-29 DIAGNOSIS — R10.9 RIGHT FLANK PAIN: ICD-10-CM

## 2021-12-29 LAB
ANION GAP SERPL CALCULATED.3IONS-SCNC: 7 MMOL/L (ref 3–14)
BASOPHILS # BLD AUTO: 0.1 10E3/UL (ref 0–0.2)
BASOPHILS NFR BLD AUTO: 0 %
BUN SERPL-MCNC: 17 MG/DL (ref 7–30)
CALCIUM SERPL-MCNC: 8.3 MG/DL (ref 8.5–10.1)
CHLORIDE BLD-SCNC: 109 MMOL/L (ref 94–109)
CO2 SERPL-SCNC: 21 MMOL/L (ref 20–32)
CREAT SERPL-MCNC: 1.21 MG/DL (ref 0.52–1.04)
EOSINOPHIL # BLD AUTO: 0 10E3/UL (ref 0–0.7)
EOSINOPHIL NFR BLD AUTO: 0 %
ERYTHROCYTE [DISTWIDTH] IN BLOOD BY AUTOMATED COUNT: 12.6 % (ref 10–15)
GFR SERPL CREATININE-BSD FRML MDRD: 59 ML/MIN/1.73M2
GLUCOSE BLD-MCNC: 119 MG/DL (ref 70–99)
HCT VFR BLD AUTO: 40.6 % (ref 35–47)
HGB BLD-MCNC: 13 G/DL (ref 11.7–15.7)
IMM GRANULOCYTES # BLD: 0.1 10E3/UL
IMM GRANULOCYTES NFR BLD: 1 %
LACTATE SERPL-SCNC: 0.7 MMOL/L (ref 0.7–2)
LYMPHOCYTES # BLD AUTO: 0.5 10E3/UL (ref 0.8–5.3)
LYMPHOCYTES NFR BLD AUTO: 3 %
MCH RBC QN AUTO: 29.4 PG (ref 26.5–33)
MCHC RBC AUTO-ENTMCNC: 32 G/DL (ref 31.5–36.5)
MCV RBC AUTO: 92 FL (ref 78–100)
MONOCYTES # BLD AUTO: 0.4 10E3/UL (ref 0–1.3)
MONOCYTES NFR BLD AUTO: 2 %
NEUTROPHILS # BLD AUTO: 16.1 10E3/UL (ref 1.6–8.3)
NEUTROPHILS NFR BLD AUTO: 94 %
NRBC # BLD AUTO: 0 10E3/UL
NRBC BLD AUTO-RTO: 0 /100
PLATELET # BLD AUTO: 147 10E3/UL (ref 150–450)
POTASSIUM BLD-SCNC: 3.9 MMOL/L (ref 3.4–5.3)
RBC # BLD AUTO: 4.42 10E6/UL (ref 3.8–5.2)
SODIUM SERPL-SCNC: 137 MMOL/L (ref 133–144)
WBC # BLD AUTO: 17.1 10E3/UL (ref 4–11)

## 2021-12-29 PROCEDURE — C9803 HOPD COVID-19 SPEC COLLECT: HCPCS

## 2021-12-29 PROCEDURE — 96375 TX/PRO/DX INJ NEW DRUG ADDON: CPT

## 2021-12-29 PROCEDURE — 87040 BLOOD CULTURE FOR BACTERIA: CPT | Performed by: EMERGENCY MEDICINE

## 2021-12-29 PROCEDURE — 96374 THER/PROPH/DIAG INJ IV PUSH: CPT

## 2021-12-29 PROCEDURE — 99285 EMERGENCY DEPT VISIT HI MDM: CPT | Mod: 25

## 2021-12-29 PROCEDURE — 258N000003 HC RX IP 258 OP 636: Performed by: EMERGENCY MEDICINE

## 2021-12-29 PROCEDURE — 36415 COLL VENOUS BLD VENIPUNCTURE: CPT | Performed by: EMERGENCY MEDICINE

## 2021-12-29 PROCEDURE — 83605 ASSAY OF LACTIC ACID: CPT | Performed by: EMERGENCY MEDICINE

## 2021-12-29 PROCEDURE — 250N000011 HC RX IP 250 OP 636: Performed by: EMERGENCY MEDICINE

## 2021-12-29 PROCEDURE — 96376 TX/PRO/DX INJ SAME DRUG ADON: CPT

## 2021-12-29 PROCEDURE — 80048 BASIC METABOLIC PNL TOTAL CA: CPT | Performed by: EMERGENCY MEDICINE

## 2021-12-29 PROCEDURE — 120N000001 HC R&B MED SURG/OB

## 2021-12-29 PROCEDURE — 85025 COMPLETE CBC W/AUTO DIFF WBC: CPT | Performed by: EMERGENCY MEDICINE

## 2021-12-29 PROCEDURE — 96361 HYDRATE IV INFUSION ADD-ON: CPT

## 2021-12-29 RX ORDER — KETOROLAC TROMETHAMINE 15 MG/ML
15 INJECTION, SOLUTION INTRAMUSCULAR; INTRAVENOUS ONCE
Status: COMPLETED | OUTPATIENT
Start: 2021-12-29 | End: 2021-12-29

## 2021-12-29 RX ORDER — SUMATRIPTAN 50 MG/1
50 TABLET, FILM COATED ORAL
COMMUNITY
End: 2022-07-08

## 2021-12-29 RX ORDER — ONDANSETRON 2 MG/ML
4 INJECTION INTRAMUSCULAR; INTRAVENOUS ONCE
Status: COMPLETED | OUTPATIENT
Start: 2021-12-29 | End: 2021-12-29

## 2021-12-29 RX ORDER — DIPHENHYDRAMINE HYDROCHLORIDE 50 MG/ML
25 INJECTION INTRAMUSCULAR; INTRAVENOUS ONCE
Status: COMPLETED | OUTPATIENT
Start: 2021-12-29 | End: 2021-12-29

## 2021-12-29 RX ORDER — CEFTRIAXONE 2 G/1
2 INJECTION, POWDER, FOR SOLUTION INTRAMUSCULAR; INTRAVENOUS ONCE
Status: COMPLETED | OUTPATIENT
Start: 2021-12-29 | End: 2021-12-30

## 2021-12-29 RX ADMIN — HYDROMORPHONE HYDROCHLORIDE 1 MG: 1 INJECTION, SOLUTION INTRAMUSCULAR; INTRAVENOUS; SUBCUTANEOUS at 23:32

## 2021-12-29 RX ADMIN — SODIUM CHLORIDE 1000 ML: 9 INJECTION, SOLUTION INTRAVENOUS at 23:36

## 2021-12-29 RX ADMIN — ONDANSETRON 4 MG: 2 INJECTION INTRAMUSCULAR; INTRAVENOUS at 23:31

## 2021-12-29 RX ADMIN — HYDROMORPHONE HYDROCHLORIDE 1 MG: 1 INJECTION, SOLUTION INTRAMUSCULAR; INTRAVENOUS; SUBCUTANEOUS at 22:50

## 2021-12-29 RX ADMIN — KETOROLAC TROMETHAMINE 15 MG: 15 INJECTION, SOLUTION INTRAMUSCULAR; INTRAVENOUS at 23:34

## 2021-12-29 RX ADMIN — DIPHENHYDRAMINE HYDROCHLORIDE 25 MG: 50 INJECTION INTRAMUSCULAR; INTRAVENOUS at 23:49

## 2021-12-29 ASSESSMENT — ENCOUNTER SYMPTOMS
FEVER: 1
FLANK PAIN: 1
SHORTNESS OF BREATH: 1
NAUSEA: 1
DYSURIA: 1
DIARRHEA: 0
VOMITING: 1

## 2021-12-29 ASSESSMENT — MIFFLIN-ST. JEOR: SCORE: 1525.03

## 2021-12-30 ENCOUNTER — APPOINTMENT (OUTPATIENT)
Dept: ULTRASOUND IMAGING | Facility: CLINIC | Age: 36
End: 2021-12-30
Attending: INTERNAL MEDICINE
Payer: COMMERCIAL

## 2021-12-30 LAB
ALBUMIN SERPL-MCNC: 2.7 G/DL (ref 3.4–5)
ALBUMIN UR-MCNC: 300 MG/DL
ALBUMIN UR-MCNC: 300 MG/DL
ALP SERPL-CCNC: 38 U/L (ref 40–150)
ALT SERPL W P-5'-P-CCNC: 16 U/L (ref 0–50)
ANION GAP SERPL CALCULATED.3IONS-SCNC: 6 MMOL/L (ref 3–14)
APPEARANCE UR: ABNORMAL
APPEARANCE UR: ABNORMAL
AST SERPL W P-5'-P-CCNC: 10 U/L (ref 0–45)
BASOPHILS # BLD AUTO: 0 10E3/UL (ref 0–0.2)
BASOPHILS NFR BLD AUTO: 0 %
BILIRUB SERPL-MCNC: 0.4 MG/DL (ref 0.2–1.3)
BILIRUB UR QL STRIP: NEGATIVE
BILIRUB UR QL STRIP: NEGATIVE
BUN SERPL-MCNC: 22 MG/DL (ref 7–30)
CALCIUM SERPL-MCNC: 7.6 MG/DL (ref 8.5–10.1)
CHLORIDE BLD-SCNC: 111 MMOL/L (ref 94–109)
CO2 SERPL-SCNC: 21 MMOL/L (ref 20–32)
COLOR UR AUTO: ABNORMAL
COLOR UR AUTO: ABNORMAL
CREAT SERPL-MCNC: 2.08 MG/DL (ref 0.52–1.04)
CREAT UR-MCNC: 156 MG/DL
EOSINOPHIL # BLD AUTO: 0 10E3/UL (ref 0–0.7)
EOSINOPHIL NFR BLD AUTO: 0 %
ERYTHROCYTE [DISTWIDTH] IN BLOOD BY AUTOMATED COUNT: 12.9 % (ref 10–15)
FRACT EXCRET NA UR+SERPL-RTO: 0.5 %
GFR SERPL CREATININE-BSD FRML MDRD: 31 ML/MIN/1.73M2
GLUCOSE BLD-MCNC: 84 MG/DL (ref 70–99)
GLUCOSE UR STRIP-MCNC: 50 MG/DL
GLUCOSE UR STRIP-MCNC: NEGATIVE MG/DL
HCT VFR BLD AUTO: 33.5 % (ref 35–47)
HGB BLD-MCNC: 10.8 G/DL (ref 11.7–15.7)
HGB UR QL STRIP: ABNORMAL
HGB UR QL STRIP: ABNORMAL
HOLD SPECIMEN: NORMAL
HYALINE CASTS: 14 /LPF
IMM GRANULOCYTES # BLD: 0.1 10E3/UL
IMM GRANULOCYTES NFR BLD: 1 %
KETONES UR STRIP-MCNC: 10 MG/DL
KETONES UR STRIP-MCNC: 20 MG/DL
LEUKOCYTE ESTERASE UR QL STRIP: ABNORMAL
LEUKOCYTE ESTERASE UR QL STRIP: ABNORMAL
LYMPHOCYTES # BLD AUTO: 0.9 10E3/UL (ref 0.8–5.3)
LYMPHOCYTES NFR BLD AUTO: 8 %
MCH RBC QN AUTO: 29.7 PG (ref 26.5–33)
MCHC RBC AUTO-ENTMCNC: 32.2 G/DL (ref 31.5–36.5)
MCV RBC AUTO: 92 FL (ref 78–100)
MONOCYTES # BLD AUTO: 0.3 10E3/UL (ref 0–1.3)
MONOCYTES NFR BLD AUTO: 3 %
MUCOUS THREADS #/AREA URNS LPF: PRESENT /LPF
NEUTROPHILS # BLD AUTO: 9.2 10E3/UL (ref 1.6–8.3)
NEUTROPHILS NFR BLD AUTO: 88 %
NITRATE UR QL: NEGATIVE
NITRATE UR QL: NEGATIVE
NRBC # BLD AUTO: 0 10E3/UL
NRBC BLD AUTO-RTO: 0 /100
PH UR STRIP: 5.5 [PH] (ref 5–7)
PH UR STRIP: 6.5 [PH] (ref 5–7)
PLATELET # BLD AUTO: 115 10E3/UL (ref 150–450)
POTASSIUM BLD-SCNC: 4 MMOL/L (ref 3.4–5.3)
PROT SERPL-MCNC: 5.8 G/DL (ref 6.8–8.8)
RBC # BLD AUTO: 3.64 10E6/UL (ref 3.8–5.2)
RBC URINE: >182 /HPF
RBC URINE: >182 /HPF
SARS-COV-2 RNA RESP QL NAA+PROBE: NEGATIVE
SODIUM SERPL-SCNC: 138 MMOL/L (ref 133–144)
SODIUM UR-SCNC: 56 MMOL/L
SP GR UR STRIP: 1.02 (ref 1–1.03)
SP GR UR STRIP: 1.03 (ref 1–1.03)
SQUAMOUS EPITHELIAL: 1 /HPF
SQUAMOUS EPITHELIAL: 3 /HPF
UROBILINOGEN UR STRIP-MCNC: NORMAL MG/DL
UROBILINOGEN UR STRIP-MCNC: NORMAL MG/DL
WBC # BLD AUTO: 10.5 10E3/UL (ref 4–11)
WBC CLUMPS #/AREA URNS HPF: PRESENT /HPF
WBC URINE: >182 /HPF
WBC URINE: >182 /HPF

## 2021-12-30 PROCEDURE — 258N000003 HC RX IP 258 OP 636: Performed by: INTERNAL MEDICINE

## 2021-12-30 PROCEDURE — 84300 ASSAY OF URINE SODIUM: CPT | Performed by: INTERNAL MEDICINE

## 2021-12-30 PROCEDURE — 36415 COLL VENOUS BLD VENIPUNCTURE: CPT | Performed by: INTERNAL MEDICINE

## 2021-12-30 PROCEDURE — 80053 COMPREHEN METABOLIC PANEL: CPT | Performed by: INTERNAL MEDICINE

## 2021-12-30 PROCEDURE — 258N000003 HC RX IP 258 OP 636: Performed by: EMERGENCY MEDICINE

## 2021-12-30 PROCEDURE — 96376 TX/PRO/DX INJ SAME DRUG ADON: CPT

## 2021-12-30 PROCEDURE — 99233 SBSQ HOSP IP/OBS HIGH 50: CPT | Performed by: INTERNAL MEDICINE

## 2021-12-30 PROCEDURE — 96361 HYDRATE IV INFUSION ADD-ON: CPT

## 2021-12-30 PROCEDURE — 120N000001 HC R&B MED SURG/OB

## 2021-12-30 PROCEDURE — 96375 TX/PRO/DX INJ NEW DRUG ADDON: CPT

## 2021-12-30 PROCEDURE — 76770 US EXAM ABDO BACK WALL COMP: CPT

## 2021-12-30 PROCEDURE — 250N000011 HC RX IP 250 OP 636: Performed by: INTERNAL MEDICINE

## 2021-12-30 PROCEDURE — 87086 URINE CULTURE/COLONY COUNT: CPT | Performed by: INTERNAL MEDICINE

## 2021-12-30 PROCEDURE — 87635 SARS-COV-2 COVID-19 AMP PRB: CPT | Performed by: EMERGENCY MEDICINE

## 2021-12-30 PROCEDURE — 81001 URINALYSIS AUTO W/SCOPE: CPT | Performed by: INTERNAL MEDICINE

## 2021-12-30 PROCEDURE — 99223 1ST HOSP IP/OBS HIGH 75: CPT | Mod: AI | Performed by: INTERNAL MEDICINE

## 2021-12-30 PROCEDURE — 85025 COMPLETE CBC W/AUTO DIFF WBC: CPT | Performed by: INTERNAL MEDICINE

## 2021-12-30 PROCEDURE — 250N000013 HC RX MED GY IP 250 OP 250 PS 637: Performed by: INTERNAL MEDICINE

## 2021-12-30 PROCEDURE — 96365 THER/PROPH/DIAG IV INF INIT: CPT

## 2021-12-30 PROCEDURE — 250N000011 HC RX IP 250 OP 636: Performed by: EMERGENCY MEDICINE

## 2021-12-30 RX ORDER — NALOXONE HYDROCHLORIDE 0.4 MG/ML
0.4 INJECTION, SOLUTION INTRAMUSCULAR; INTRAVENOUS; SUBCUTANEOUS
Status: DISCONTINUED | OUTPATIENT
Start: 2021-12-30 | End: 2022-01-02 | Stop reason: HOSPADM

## 2021-12-30 RX ORDER — ONDANSETRON 2 MG/ML
4 INJECTION INTRAMUSCULAR; INTRAVENOUS ONCE
Status: COMPLETED | OUTPATIENT
Start: 2021-12-30 | End: 2021-12-30

## 2021-12-30 RX ORDER — ONDANSETRON 4 MG/1
4 TABLET, ORALLY DISINTEGRATING ORAL EVERY 6 HOURS PRN
Status: DISCONTINUED | OUTPATIENT
Start: 2021-12-30 | End: 2022-01-02 | Stop reason: HOSPADM

## 2021-12-30 RX ORDER — CETIRIZINE HYDROCHLORIDE 10 MG/1
10 TABLET ORAL DAILY
Status: DISCONTINUED | OUTPATIENT
Start: 2021-12-30 | End: 2022-01-02 | Stop reason: HOSPADM

## 2021-12-30 RX ORDER — METHYLPHENIDATE HYDROCHLORIDE EXTENDED RELEASE 10 MG/1
20 TABLET ORAL DAILY
Status: DISCONTINUED | OUTPATIENT
Start: 2021-12-30 | End: 2022-01-02 | Stop reason: HOSPADM

## 2021-12-30 RX ORDER — POLYETHYLENE GLYCOL 3350 17 G/17G
17 POWDER, FOR SOLUTION ORAL DAILY
Status: DISCONTINUED | OUTPATIENT
Start: 2021-12-30 | End: 2022-01-02 | Stop reason: HOSPADM

## 2021-12-30 RX ORDER — NALOXONE HYDROCHLORIDE 0.4 MG/ML
0.2 INJECTION, SOLUTION INTRAMUSCULAR; INTRAVENOUS; SUBCUTANEOUS
Status: DISCONTINUED | OUTPATIENT
Start: 2021-12-30 | End: 2022-01-02 | Stop reason: HOSPADM

## 2021-12-30 RX ORDER — ONDANSETRON 2 MG/ML
4 INJECTION INTRAMUSCULAR; INTRAVENOUS EVERY 6 HOURS PRN
Status: DISCONTINUED | OUTPATIENT
Start: 2021-12-30 | End: 2022-01-02 | Stop reason: HOSPADM

## 2021-12-30 RX ORDER — HYDROMORPHONE HCL IN WATER/PF 6 MG/30 ML
0.2 PATIENT CONTROLLED ANALGESIA SYRINGE INTRAVENOUS
Status: DISCONTINUED | OUTPATIENT
Start: 2021-12-30 | End: 2022-01-01

## 2021-12-30 RX ORDER — ONDANSETRON 2 MG/ML
INJECTION INTRAMUSCULAR; INTRAVENOUS
Status: DISCONTINUED
Start: 2021-12-30 | End: 2021-12-30 | Stop reason: HOSPADM

## 2021-12-30 RX ORDER — CEFTRIAXONE 1 G/1
1 INJECTION, POWDER, FOR SOLUTION INTRAMUSCULAR; INTRAVENOUS EVERY 24 HOURS
Status: DISCONTINUED | OUTPATIENT
Start: 2021-12-31 | End: 2022-01-02

## 2021-12-30 RX ORDER — PROCHLORPERAZINE 25 MG
25 SUPPOSITORY, RECTAL RECTAL EVERY 12 HOURS PRN
Status: DISCONTINUED | OUTPATIENT
Start: 2021-12-30 | End: 2022-01-02 | Stop reason: HOSPADM

## 2021-12-30 RX ORDER — SODIUM CHLORIDE, SODIUM LACTATE, POTASSIUM CHLORIDE, CALCIUM CHLORIDE 600; 310; 30; 20 MG/100ML; MG/100ML; MG/100ML; MG/100ML
INJECTION, SOLUTION INTRAVENOUS CONTINUOUS
Status: DISCONTINUED | OUTPATIENT
Start: 2021-12-30 | End: 2022-01-02 | Stop reason: HOSPADM

## 2021-12-30 RX ORDER — PROCHLORPERAZINE MALEATE 10 MG
10 TABLET ORAL EVERY 6 HOURS PRN
Status: DISCONTINUED | OUTPATIENT
Start: 2021-12-30 | End: 2022-01-02 | Stop reason: HOSPADM

## 2021-12-30 RX ORDER — LIDOCAINE 40 MG/G
CREAM TOPICAL
Status: DISCONTINUED | OUTPATIENT
Start: 2021-12-30 | End: 2022-01-02 | Stop reason: HOSPADM

## 2021-12-30 RX ADMIN — HYDROMORPHONE HYDROCHLORIDE 1 MG: 1 INJECTION, SOLUTION INTRAMUSCULAR; INTRAVENOUS; SUBCUTANEOUS at 01:36

## 2021-12-30 RX ADMIN — HYDROMORPHONE HYDROCHLORIDE 1 MG: 1 INJECTION, SOLUTION INTRAMUSCULAR; INTRAVENOUS; SUBCUTANEOUS at 03:55

## 2021-12-30 RX ADMIN — CEFTRIAXONE SODIUM 2 G: 2 INJECTION, POWDER, FOR SOLUTION INTRAMUSCULAR; INTRAVENOUS at 00:08

## 2021-12-30 RX ADMIN — ONDANSETRON 4 MG: 2 INJECTION INTRAMUSCULAR; INTRAVENOUS at 03:54

## 2021-12-30 RX ADMIN — HYDROMORPHONE HYDROCHLORIDE 0.2 MG: 0.2 INJECTION, SOLUTION INTRAMUSCULAR; INTRAVENOUS; SUBCUTANEOUS at 11:05

## 2021-12-30 RX ADMIN — HYDROMORPHONE HYDROCHLORIDE 0.2 MG: 0.2 INJECTION, SOLUTION INTRAMUSCULAR; INTRAVENOUS; SUBCUTANEOUS at 15:28

## 2021-12-30 RX ADMIN — SODIUM CHLORIDE, POTASSIUM CHLORIDE, SODIUM LACTATE AND CALCIUM CHLORIDE: 600; 310; 30; 20 INJECTION, SOLUTION INTRAVENOUS at 20:02

## 2021-12-30 RX ADMIN — HYDROMORPHONE HYDROCHLORIDE 0.2 MG: 0.2 INJECTION, SOLUTION INTRAMUSCULAR; INTRAVENOUS; SUBCUTANEOUS at 09:32

## 2021-12-30 RX ADMIN — PROCHLORPERAZINE EDISYLATE 10 MG: 5 INJECTION INTRAMUSCULAR; INTRAVENOUS at 20:06

## 2021-12-30 RX ADMIN — SODIUM CHLORIDE, POTASSIUM CHLORIDE, SODIUM LACTATE AND CALCIUM CHLORIDE: 600; 310; 30; 20 INJECTION, SOLUTION INTRAVENOUS at 04:55

## 2021-12-30 RX ADMIN — SODIUM CHLORIDE 1000 ML: 9 INJECTION, SOLUTION INTRAVENOUS at 01:36

## 2021-12-30 RX ADMIN — HYDROMORPHONE HYDROCHLORIDE 0.2 MG: 0.2 INJECTION, SOLUTION INTRAMUSCULAR; INTRAVENOUS; SUBCUTANEOUS at 19:35

## 2021-12-30 RX ADMIN — HYDROMORPHONE HYDROCHLORIDE 0.2 MG: 0.2 INJECTION, SOLUTION INTRAMUSCULAR; INTRAVENOUS; SUBCUTANEOUS at 07:35

## 2021-12-30 RX ADMIN — CETIRIZINE HYDROCHLORIDE 10 MG: 10 TABLET, FILM COATED ORAL at 08:48

## 2021-12-30 RX ADMIN — PROCHLORPERAZINE EDISYLATE 10 MG: 5 INJECTION INTRAMUSCULAR; INTRAVENOUS at 07:35

## 2021-12-30 RX ADMIN — ONDANSETRON 4 MG: 2 INJECTION INTRAMUSCULAR; INTRAVENOUS at 11:05

## 2021-12-30 RX ADMIN — HYDROMORPHONE HYDROCHLORIDE 0.2 MG: 0.2 INJECTION, SOLUTION INTRAMUSCULAR; INTRAVENOUS; SUBCUTANEOUS at 04:55

## 2021-12-30 RX ADMIN — ONDANSETRON 4 MG: 2 INJECTION INTRAMUSCULAR; INTRAVENOUS at 04:54

## 2021-12-30 RX ADMIN — HYDROMORPHONE HYDROCHLORIDE 0.2 MG: 0.2 INJECTION, SOLUTION INTRAMUSCULAR; INTRAVENOUS; SUBCUTANEOUS at 17:31

## 2021-12-30 RX ADMIN — ONDANSETRON 4 MG: 2 INJECTION INTRAMUSCULAR; INTRAVENOUS at 17:25

## 2021-12-30 RX ADMIN — SODIUM CHLORIDE, POTASSIUM CHLORIDE, SODIUM LACTATE AND CALCIUM CHLORIDE 250 ML: 600; 310; 30; 20 INJECTION, SOLUTION INTRAVENOUS at 14:25

## 2021-12-30 RX ADMIN — PROCHLORPERAZINE EDISYLATE 10 MG: 5 INJECTION INTRAMUSCULAR; INTRAVENOUS at 13:28

## 2021-12-30 RX ADMIN — POLYETHYLENE GLYCOL 3350 17 G: 17 POWDER, FOR SOLUTION ORAL at 08:48

## 2021-12-30 RX ADMIN — ONDANSETRON 4 MG: 2 INJECTION INTRAMUSCULAR; INTRAVENOUS at 01:38

## 2021-12-30 RX ADMIN — HYDROMORPHONE HYDROCHLORIDE 0.2 MG: 0.2 INJECTION, SOLUTION INTRAMUSCULAR; INTRAVENOUS; SUBCUTANEOUS at 13:19

## 2021-12-30 RX ADMIN — HYDROMORPHONE HYDROCHLORIDE 0.2 MG: 0.2 INJECTION, SOLUTION INTRAMUSCULAR; INTRAVENOUS; SUBCUTANEOUS at 21:34

## 2021-12-30 RX ADMIN — METHYLPHENIDATE HYDROCHLORIDE 20 MG: 10 TABLET, EXTENDED RELEASE ORAL at 08:48

## 2021-12-30 ASSESSMENT — ACTIVITIES OF DAILY LIVING (ADL)
ADLS_ACUITY_SCORE: 4
ADLS_ACUITY_SCORE: 7
ADLS_ACUITY_SCORE: 4
ADLS_ACUITY_SCORE: 7
ADLS_ACUITY_SCORE: 4
ADLS_ACUITY_SCORE: 7
ADLS_ACUITY_SCORE: 4

## 2021-12-30 NOTE — ED PROVIDER NOTES
History   Chief Complaint:  Abdominal Pain (Diagnosed this morning with pyelonephritis at [facility]. Pain has gotten increasingly worse. Taking Zofran without help. N/V. )     HPI   Nel Durán is a 36 year old female with history of endometriosis,  x2, cervicectomy and tubal ligation who presents with abdominal pain. The patient was seen at [facility] this morning for pyelonephritis and was started on Cefuroxime. She was given one dose there and took another this evening. She felt okay while driving home earlier but then the pain steadily increased. She reports 3-4 days of dysuria and cloudy urine. She developed diffuse pelvic pain today that radiates into the right flank area. She experiences some shortness of breath with the pain. She has also been experiencing vomiting and nausea. She also feels she has had an intermittent fever. She denies diarrhea and chest pain. She last had a kidney infection in  but did not have associated flank pain.    Previous Workup (Today):  Laboratory  HCG urine: negative  UA: ketone 40, WBC small, specific gravity >1.030  Urine microscopy: WBC 30-49, Bacteria present, RBC 3-9    Imaging  CT Abdomen & Pelvis w/o Oral w/ IV:  1.  No acute inflammatory changes seen. Normal appendix visualized.   2.  There is some nonspecific fluid within small bowel loops and within the right colon which could be seen in the setting of an enteritis. Correlate clinically.   3.  2.4 x 3 cm complex lesion of the left kidney. There could be a solid component in this lesion. Recommend nonemergent multiphase follow-up MRI of the kidneys for further evaluation.     Review of Systems   Constitutional: Positive for fever.   Respiratory: Positive for shortness of breath.    Cardiovascular: Negative for chest pain.   Gastrointestinal: Positive for nausea and vomiting. Negative for diarrhea.   Genitourinary: Positive for dysuria, flank pain and pelvic pain.   All  "other systems reviewed and are negative.    Allergies:  Iodine    Medications:  Imitrex    Past Medical History:     ADD  Depression  Endometriosis    Past Surgical History:     x2  Bladder surgery  Cervicectomy  Labialplasty  Tubal ligation  Lysis adhesions  Laparotomy exploratory    Family History:    Mother- colon polyps, hypertension   Father- colon polyps  Brother- substance abuse    Social History:  Presents with her father  Vaccinated against Covid-19    Physical Exam     Patient Vitals for the past 24 hrs:   BP Temp Temp src Pulse Resp SpO2 Height Weight   21 0434 113/56 98.1  F (36.7  C) Oral 83 -- 98 % -- --   21 0400 100/60 -- -- -- -- -- -- --   21 0345 96/68 -- -- 64 -- 100 % -- --   21 0330 100/52 -- -- 67 16 98 % -- --   21 0315 91/51 -- -- 72 -- 96 % -- --   21 0300 97/47 -- -- 64 -- 99 % -- --   21 0245 97/61 -- -- 80 -- 97 % -- --   21 0230 94/50 -- -- 69 -- 100 % -- --   21 0215 100/66 -- -- 70 -- 96 % -- --   21 0200 108/69 -- -- 71 -- 98 % -- --   21 0155 -- -- -- -- -- 98 % -- --   21 0145 111/61 -- -- 79 -- 96 % -- --   21 0135 104/68 -- -- 87 -- 97 % -- --   21 0130 108/68 -- -- 68 -- 100 % -- --   21 0120 -- -- -- -- -- 98 % -- --   21 0050 -- -- -- -- -- 99 % -- --   21 0025 -- -- -- -- -- 94 % -- --   21 0015 -- -- -- -- -- 97 % -- --   21 2345 115/69 -- -- 81 -- 97 % -- --   21 2340 113/60 -- -- 75 -- -- -- --   21 2233 131/66 99.3  F (37.4  C) Temporal 98 18 98 % 1.651 m (5' 5\") 83.9 kg (185 lb)       Physical Exam  General: Alert, appears well-developed and well-nourished. Cooperative.     In moderate distress  HEENT:  Head:  Atraumatic  Ears:  External ears are normal  Mouth/Throat:  Oropharynx is without erythema or exudate and mucous membranes are dry.   Eyes:   Conjunctivae normal and EOM are normal. No scleral icterus.  CV:  Normal rate, regular " rhythm, normal heart sounds and radial pulses are 2+ and symmetric.  No murmur.  Resp:  Breath sounds are clear bilaterally    Non-labored, no retractions or accessory muscle use  GI:  Abdomen is soft, no distension, RLQ, midline suprapubic and LLQ tenderness. No rebound or guarding.  Right CVA tenderness  MS:  Normal range of motion. No edema.    Normal strength in all 4 extremities.     Back atraumatic.    No midline cervical, thoracic, or lumbar tenderness  Skin:  Warm and dry.  No rash or lesions noted.  Neuro: Alert. Normal strength.  GCS: 15  Psych:  Normal mood and affect.    Emergency Department Course     Laboratory:  Labs Ordered and Resulted from Time of ED Arrival to Time of ED Departure   BASIC METABOLIC PANEL - Abnormal       Result Value    Sodium 137      Potassium 3.9      Chloride 109      Carbon Dioxide (CO2) 21      Anion Gap 7      Urea Nitrogen 17      Creatinine 1.21 (*)     Calcium 8.3 (*)     Glucose 119 (*)     GFR Estimate 59 (*)    CBC WITH PLATELETS AND DIFFERENTIAL - Abnormal    WBC Count 17.1 (*)     RBC Count 4.42      Hemoglobin 13.0      Hematocrit 40.6      MCV 92      MCH 29.4      MCHC 32.0      RDW 12.6      Platelet Count 147 (*)     % Neutrophils 94      % Lymphocytes 3      % Monocytes 2      % Eosinophils 0      % Basophils 0      % Immature Granulocytes 1      NRBCs per 100 WBC 0      Absolute Neutrophils 16.1 (*)     Absolute Lymphocytes 0.5 (*)     Absolute Monocytes 0.4      Absolute Eosinophils 0.0      Absolute Basophils 0.1      Absolute Immature Granulocytes 0.1      Absolute NRBCs 0.0     LACTIC ACID WHOLE BLOOD - Normal    Lactic Acid 0.7     COVID-19 VIRUS (CORONAVIRUS) BY PCR - Normal    SARS CoV2 PCR Negative     BLOOD CULTURE   BLOOD CULTURE      Emergency Department Course:    Reviewed:  I reviewed nursing notes, vitals and past medical history    Assessments:  2330 I obtained history and examined the patient as noted above. I explained findings and plan at  this time.      Consults:  2343 I spoke with Dr. Rossi of the hospitalist service.     Interventions:  2250: Dilaudid 1 mg IV   2331: Zofran 4 mg IV   2332: Dilaudid 1 mg IV   2334: Toradol 15 mg IV   2336: NS 1L IV Bolus   2349: Benadryl 25 mg IV     Disposition:  The patient was admitted to the hospital under the care of Dr. Rossi.     Impression & Plan     CMS Diagnoses: None    Medical Decision Making:  Nel Durán is a 36 year old female who presents for evaluation of right flank pain, nausea, vomiting and abd pain in setting of recently diagnosed pyelonephritis.  Urinalysis is consistent with a urinary tract infection (UA obtained at outside clinic); given the systemic symptoms present, signs and symptoms consistent with pyelonephritis.   There is no clinical evidence of perinephric abscess, emphysematous pyelonephritis, ureterolithiasis, appendicitis, colitis, diverticulitis or any intraabdominal catastrophe seen on CT from outside hospital earlier today.  Patient was given dose of antibiotics in ED; see above.      Patient appears ill and still has systemic symptoms.  I am concerned about the patients ability to tolerate outpatient management and keep antibiotics down.  Blood cultures were sent, fluids given and patient will be admitted to medicine for further cares.      Diagnosis:    ICD-10-CM    1. Pyelonephritis  N12    2. Abdominal pain, generalized  R10.84    3. Right flank pain  R10.9        Scribe Disclosure:  I, Manuela Paulino, am serving as a scribe at 11:26 PM on 12/29/2021 to document services personally performed by Ranjan Bentley MD based on my observations and the provider's statements to me.            Ranjan Bentley MD  12/30/21 0614       Ranjan Bentley MD  12/30/21 0690

## 2021-12-30 NOTE — PLAN OF CARE
A&Ox4, VSS on RA. Up SBA to BR once during shift - experienced severe pain with urination and increased movement/pressure on bladder. IV zofran & compazine given each Q6h. IV dilaudid given Q2H to control pain. L PIV infusing LR increased to 125 mL/hr with 250mL bolus at end of shift. Bladder scanned for 182mL, pt requested to be straight cathed as it is very painful for her to urinate by self. MD informed, okay to straight cath x1 and send new UA. Plan for IV abx and to control pain and nausea prior to discharge.

## 2021-12-30 NOTE — CONSULTS
"BRIEF NUTRITION ASSESSMENT      REASON FOR ASSESSMENT:  Nel Durán is a 36 year old female seen by Registered Dietitian for Admission Nutrition Risk Screen:  Have you recently lost weight without trying? \">34#\"  Have you been eating poorly because of a decreased appetite? \"yes\"      CURRENT DIET AND INTAKE:  Diet:  Clear Liquids              Chart reviewed  Visited with pt this afternoon  Pt works as a Medic  Notes that over this past weekend, she started to notice signs of a bladder infection  Has been eating less the past few days  Typically follows a gluten-free diet and has a good appetite  Currently pt c/o a dry mouth - would like to order something to drink    ANTHROPOMETRICS:  Height: 5' 5\"  Weight:(12/29) 83.9 kg /  185 lbs 0 oz  Body mass index is 30.79 kg/m .   Weight Status: Obesity Grade I BMI 30-34.9  IBW:  56.8 kg  %IBW: 148%  Weight History:   Pt states that her current wt is a bit high - \"think because my stomach is bloated\"  She has not had any wt loss    Wt Readings from Last 10 Encounters:   12/29/21 83.9 kg (185 lb)   12/03/20 77.1 kg (170 lb)   07/27/20 121.6 kg (268 lb)   09/24/19 82.1 kg (181 lb)   04/22/19 82.4 kg (181 lb 9.6 oz)   03/27/18 81.6 kg (180 lb)   03/22/18 81.2 kg (179 lb)   02/22/18 81.2 kg (179 lb)   02/05/18 79.4 kg (175 lb)   08/18/17 86.2 kg (190 lb)         LABS:  Labs noted    MALNUTRITION:  Patient does not meet two of the criteria necessary for diagnosing malnutrition.       NUTRITION INTERVENTION:  Nutrition Diagnosis:  No nutrition diagnosis at this time.    Implementation:  Nutrition Education ---> Provided pt with a menu.  Discussed diet order and meal ordering process    FOLLOW UP/MONITORING:   Will re-evaluate in 7 - 10 days, or sooner, if re-consulted.          "

## 2021-12-30 NOTE — PROGRESS NOTES
RECEIVING UNIT ED HANDOFF REVIEW    ED Nurse Handoff Report was reviewed by: Suly Guardado RN on December 30, 2021 at 3:08 AM

## 2021-12-30 NOTE — PROGRESS NOTES
[facility]    Hospitalist Progress Note    Date of Service (when I saw the patient): 12/30/2021  Admit date: 12/29/2021    Assessment & Plan    Nel Durán is 36, who presents with a 2-week history of urinary tract infection symptoms that have been coming and going, more prominent in the last 4 days with cloudy urine. Within the 24 hours she presented she developed lower abdominal radiating up to her R flank, nausea and vomiting.     She went to [facility] where she was evaluated on 12/29.  Urine hCG was negative.UA:  SG > 1.3 with ketons. 30-49 WBC, many scquamous cells. 3-9 RBCs. I do not see that cx was done. Afebrile no leukocytosis, normal creatinine at that time. Discharged on ceftin. At presentation that evening to our hospital: WBC 17. T max 99.3, Cr up to 1.21.     Imaging at [facility] on 12/29:   CT Abdomen & Pelvis w/o Oral w/ IV   Final Result   IMPRESSION:   1. No acute inflammatory changes seen. Normal appendix visualized.   2. There is some nonspecific fluid within small bowel loops and within the right colon which could be seen in the setting of an enteritis. Correlate clinically.   3. 2.4 x 3 cm complex lesion of the left kidney. There could be a solid component in this lesion. Recommend nonemergent multiphase follow-up MRI of the kidneys for further evaluation.      Acute right-sided pyelonephritis  No stone on CT She has had prior history of pyelonephritis in the past.    - Continue IV ceftriaxone (best choice based on [facility] antibiogram)   - On 12/30/21, having significant suprapubic pain radiating to R with burning with urination.   - Repeat UA/UCx given none done at outside hospital and contaminated: (>182 WBC/>182 RBC, 3 squamous)  - BCx at admission NGTD  - Given the amount of pain she is in and the rising WBC.  > recheck CBC, CMP now. If worsening, repeat CT scan without contrast to evaluate for stone.     GREG: The patient's creatinine is 1.21,  baseline 0.8. Likely secondary to dehydration.    Recent Labs   Lab 12/29/21  2245   CR 1.21*     - S/p IVF bolus in the ED  - Note soft BP in the 90's  - Give 250 bolus now and increased  ml/h.   - Follow UOP. PRN boluses if UOP less than 120 ml/3 hours.     Addendum reviewed labs: WBC is normalized. Cr going up to 2.. Afeb, HR 90, /55.    - follow UOP, give boluses for UOP < 120 ml / 3 hours or SBP under 90.   - check renal ultrasound  - check FENA  - follow bladder scans, place gonzalez if > 300 in bladder after attempt to void.    Renal mass:  The patient has been followed by [facility] for a number of years and noted the mass is a bit larger than previously.  They had attempted biopsy in the past.  Clearly, this is not the source of the patient's acute symptoms.  The patient can follow up with [facility] on an outpatient basis.    ADD (attention deficit disorder):  The patient is on Ritalin, which she takes only on a p.r.n. basis at work.    Depression:  The patient is not on any medication for this.     CODE STATUS:  FULL.     Covid19 negative  - appears she is unvaccinated. Will discuss vaccination prior to discharge.     Diet: Orders Placed This Encounter      Advance Diet as Tolerated: Clear Liquid Diet     IVF:LR 75 ml/h > increase to 100 ml/h  Gonzalez Catheter: Not present     DVT Prophylaxis: Pneumatic Compression Devices  Code Status: Full Code     Disposition: Expected discharge in 2-3 days.   Communication: Discussed with RN and patient on 12/30/21    Lexi Santos  Hospitalist Service  [facility]  Securely message with the Vocera Web Console (learn more here)  Text page via i[employer]ore Paging/Directory    Interval History   Events over last 24 hours as outlined above.   Patient in a lot of pain lower abdomen radiation to right flank. Lots of burning with urinary. No fevers. No N/V.   Denies any SOB, CP, abdominal pain, N/V/D.     -Data reviewed today: I  reviewed all new labs and imaging results over the last 24 hours. I personally reviewed no images or EKG's today.  Reviewed outside records.     Physical Exam   Temp: 97.5  F (36.4  C) Temp src: Oral BP: 93/53 Pulse: 80   Resp: 16 SpO2: 100 % O2 Device: None (Room air)    Vitals:    12/29/21 2233   Weight: 83.9 kg (185 lb)     Vital Signs with Ranges  Temp:  [97.5  F (36.4  C)-99.3  F (37.4  C)] 97.5  F (36.4  C)  Pulse:  [64-98] 80  Resp:  [16-18] 16  BP: ()/(47-69) 93/53  SpO2:  [94 %-100 %] 100 %  No intake/output data recorded.    Today's Exam  Constitutional:  Appears uncomfortable and tired, NAD,   Neuropsyche:  Alert and oriented, answers questions appropriately.   Respiratory:  Breathing comfortably, good air exchange, no wheezes, no crackles.   Cardiovascular:  Regular rate and rhythm, no edema.  GI:  soft, TTP throughout lower abdomen without rebound, some guarding, BS present  Skin/Integumen:  No acute rash or sign of bleeding.     Medications   All medications reviewed on 12/30/21      lactated ringers 100 mL/hr at 12/30/21 0455       [START ON 12/31/2021] cefTRIAXone  1 g Intravenous Q24H     cetirizine  10 mg Oral Daily     methylphenidate  20 mg Oral Daily     polyethylene glycol  17 g Oral Daily     sodium chloride (PF)  3 mL Intracatheter Q8H       Data   Recent Labs   Lab 12/29/21  2245   WBC 17.1*   HGB 13.0   MCV 92   *      POTASSIUM 3.9   CHLORIDE 109   CO2 21   BUN 17   CR 1.21*   ANIONGAP 7   CADEN 8.3*   *       No results found for this or any previous visit (from the past 24 hour(s)).

## 2021-12-30 NOTE — PLAN OF CARE
pt arrived to unit around 0430. A&Ox4, VSS on RA. she was able to ambulate to bed but was in a significant amount of pain and unable to stand up straight. Oriented to room, IV fluids were started, IV dilaudid and IV zofran were given. Pt was straight cathed in the ED prior to coming up to unit due to being unable to void spontaneously, She has not voided since arriving to unit. Plan for IV abx and to control pain and nausea prior to discharge.

## 2021-12-30 NOTE — UTILIZATION REVIEW
Admission Status; Secondary Review Determination       Under the authority of the Utilization Management Committee, the utilization review process indicated a secondary review on the above patient. The review outcome is based on review of the medical records, discussions with staff, and applying clinical experience noted on the date of the review.     (x) Inpatient Status Appropriate - This patient's medical care is consistent with medical management for inpatient care and reasonable inpatient medical practice.     RATIONALE FOR DETERMINATION     Nel Durán is a 36-year-old female with a remote history of pyelonephritis, endometriosis,  x2, cervicectomy, and tubal ligation. She presented to [facility] on 21 for evaluation of back pain and abdominal pain.  She had UTI symptoms that started about 2 weeks earlier.  4 days prior to presentation she developed some vague pelvic symptoms including dysuria and cloudy urine. She developed diffuse pelvic and right flank pain the day before presentation for which she was seen at [facility] and diagnosed with UTI versus enteritis. She was given antibiotics in the ED and discharged home on oral antibiotics. She presented to [facility] Emergency Department the next day, on 21, with worsening pain, nausea and vomiting. ED work up showed temperature 99.3, HR 98, creatinine 1.21, WBC 17.1, grossly abnormal UA (> 182 WBC, > 182 RBC, large leukocyte esterase, large blood, and WBC clumps), and negative COVID-19 testing. She was admitted to the hospital with acute pyelonephritis. She remains on IV Rocephin and continues to require IV hydromorphone for pain control.  Inpatient admission is appropriate for continued treatment of acute pyelonephritis with IV Rocephin and ongoing pain control with IV hydromorphone.      At the time of admission with the information available to the attending physician more than 2  nights Hospital complex care was anticipated, based on patient risk of adverse outcome if treated as outpatient and complex care required. Inpatient admission is appropriate based on the Medicare guidelines.     This document was produced using voice recognition software       The information on this document is developed by the utilization review team in order for the business office to ensure compliance. This only denotes the appropriateness of proper admission status and does not reflect the quality of care rendered.   The definitions of Inpatient Status and Observation Status used in making the determination above are those provided in the CMS Coverage Manual, Chapter 1 and Chapter 6, section 70.4.   Sincerely,     Vickey Zhou MD    Utilization Review  Physician Advisor  [facility].

## 2021-12-30 NOTE — ED NOTES
[facility]  ED Nurse Handoff Report    ED Chief complaint: Abdominal Pain (Diagnosed this morning with pyelonephritis at [facility]. Pain has gotten increasingly worse. Taking Zofran without help. N/V. )      ED Diagnosis:   Final diagnoses:   Pyelonephritis   Abdominal pain, generalized   Right flank pain       Code Status: to be addressed by admitting doctor    Allergies:   Allergies   Allergen Reactions     Gluten Meal      Iodine        Patient Story: pt was seen and Dx with pyelonephritis and sent home with pain medication and antibiotics. Last night pain became severe in low back, presented to ED.   Focused Assessment:  Alert independent back pain controlled at this time. Pt had slight itching after pain medication and given Benadryl IV.    Treatments and/or interventions provided: IV pain medication, IV antibiotics, IV fluids. IV pain and naausea medications.   Patient's response to treatments and/or interventions: tolerated well    To be done/followed up on inpatient unit:  unknown    Does this patient have any cognitive concerns?: none    Activity level - Baseline/Home:  Independent  Activity Level - Current:   Independent    Patient's Preferred language: English   Needed?: No    Isolation: None  Infection: Not Applicable  Patient tested for COVID 19 prior to admission: YES  Bariatric?: No    Vital Signs:   Vitals:    12/30/21 0200 12/30/21 0215 12/30/21 0230 12/30/21 0245   BP: 108/69 100/66 94/50 97/61   Pulse: 71 70 69 80   Resp:       Temp:       TempSrc:       SpO2: 98% 96% 100% 97%   Weight:       Height:           Cardiac Rhythm:     Was the PSS-3 completed:   Yes  What interventions are required if any?               Family Comments: father went home  OBS brochure/video discussed/provided to patient/family: Yes              Name of person given brochure if not patient: Pt              Relationship to patient: Pt    For the majority of the shift this patient's  behavior was Green.   Behavioral interventions performed were none.    ED NURSE PHONE NUMBER: *[id number]

## 2021-12-30 NOTE — H&P
Admitted: 2021    PRIMARY CARE PHYSICIAN:  Jovanny Zayas PA-C    PRIMARY UROLOGIST:  At [facility]    CHIEF COMPLAINT:  Abdominal pain, back pain, nausea, vomiting.    HISTORY OF PRESENT ILLNESS:  Nel Durán is a 36-year-old female with a remote history of pyelonephritis, history of endometriosis,  x2, cervicectomy, tubal ligation, who presents with back pain and abdominal pain.  The patient had UTI symptoms about 2 weeks ago.  For the last 4 days, she has had some vague pelvic symptoms, including dysuria and cloudy urine.  The patient developed diffuse pelvic pain yesterday, primarily in the back, right flank area.  She went to [facility] where she was evaluated.  Urine hCG was negative.  Her urine had 40 of ketones, small WBCs and concentrated urine.  She had a CT done of the abdomen and pelvis that showed no acute inflammatory changes, normal appendix.  There was nonspecific fluid within the small bowel loops within the right colon, which could be seen in enteritis.  There was also a 2.4 x 3 cm complex lesion of the left kidney that has been known for a number of years and is followed by [facility].  The patient was discharged on oral antibiotics.    The patient came back to [facility] Emergency Department with worsening pain, nausea and vomiting.    In the Emergency Department, the patient was seen by Dr. Ranjan Bentley.  The patient was afebrile with stable vital signs.  Blood work revealed normal electrolytes, BUN of 17, creatinine 1.2 and a calculated GFR 59.  Lactic acid was normal.  White count elevated at 17,100, hemoglobin 13, platelets of 147.  Blood cultures were obtained.  COVID test was negative.  The patient received 2 liters of normal saline, 2 grams of ceftriaxone, Zofran, Dilaudid, Toradol and Benadryl before she started feeling better.  The patient is being admitted for severe pyelonephritis.    PAST MEDICAL HISTORY:     1.  ADD.  2.   Depression.  3.  Endometriosis.    PAST SURGICAL HISTORY:    1.   x2.  2.  Bladder surgery.  3.  Cervicectomy.  4.  Labioplasty.  5.  Tubal ligation.  6.  Lysis of adhesions.  7.  Exploratory laparotomy.    FAMILY HISTORY:  Mother with colon polyps and hypertension.  Father with colon polyps.  Brother with substance abuse.    SOCIAL HISTORY:  .  No significant alcohol or tobacco.    ALLERGIES:  IODINE.    CURRENT MEDICATION LIST:  Includes:  1.  Methylphenidate.  2.  Toradol.  3.  Vitamin Complex with vitamin C.  4.  Folic acid.  5.  Zyrtec.  6.  Fish oil.  7.  Prenatal vitamin.  8.  Imitrex.  9.  Vitamin B.    REVIEW OF SYSTEMS:  Ten points reviewed and as dictated in history of present illness.  Specifically, positive for fevers, shortness of breath, nausea, vomiting, dysuria, flank pain and pelvic pain.  Otherwise, 10 points were reviewed and otherwise negative.    PHYSICAL EXAMINATION:    VITAL SIGNS:  Temperature 99.3, heart rate 98, respirations 18, blood pressure 131/66, sat 98% on room air.  GENERAL:  The patient is a nonseptic-appearing, uncomfortable 36-year-old  female.  HEENT:  Unremarkable.  Pupils equal.  Sclerae are anicteric.  Mucous membranes moist.  NECK:  JVP is not elevated.  PULMONARY:  Lungs are clear to auscultation.  CARDIOVASCULAR:  S1, S2, regular rhythm.  GASTROINTESTINAL:  Abdomen is soft.  She is positive for flank tenderness, particularly over the right.  She has mild diffuse abdominal tenderness.  Bowel sounds are normoactive.  MUSCULOSKELETAL:  No edema.  NEUROLOGIC:  She is grossly nonfocal.  Cranial nerves grossly intact.  SKIN:  Warm, dry, well perfused.  PSYCHIATRIC:  Mood and affect normal.    LABORATORY AND IMAGING STUDIES:  As dictated in history of present illness.    ASSESSMENT:  Nel Durán is 36, who presents with a 2-week history of urinary tract infection symptoms that have been coming and going, more prominent in the last 4 days with cloudy  urine, now with 24 hours of nausea, vomiting, back pain, being admitted after failing outpatient treatment with oral antibiotics for clinical pyelonephritis.    PLAN:     1.  Acute right-sided pyelonephritis:  The patient's urinalysis is abnormal.  CT scan shows no stone.  She has had prior history of pyelonephritis in the past.  The patient will be continued on IV ceftriaxone, symptomatic treatment with antiemetics, IV fluids.  Anticipate 24 to 48-hour stay.  2.  Acute kidney injury: The patient's creatinine is 1.21.  Previously had normal kidney function.  This is likely in the setting of acute pyelonephritis.  We will monitor I's and O's, IV fluids, and we will monitor her creatinine.  Anticipate this should improve.  3.  Renal mass:  The patient has been followed by [facility] for a number of years and noted the mass is a bit larger than previously.  They had attempted biopsy in the past.  Clearly, this is not the source of the patient's acute symptoms.  The patient can follow up with [facility] on an outpatient basis.  4.  ADD (attention deficit disorder):  The patient is on Ritalin, which she takes only on a p.r.n. basis at work.  5.  Depression:  The patient is not on any medication for this.  6.  Deep venous thrombosis prophylaxis:  Patient with compression boots.    CODE STATUS:  FULL.    DISPOSITION:  Inpatient admission.    Ovidio Rossi MD        D: 2021   T: 2021   MT: [facility]    Name:     LEX HUERTA  MRN:      [id number]        Account:     [id number]   :      1985           Admitted:    2021       Document: [id number]    cc:  Jovanny Zayas PA-C

## 2021-12-30 NOTE — H&P
36 year old admitted with acute pyelonephritis after failing outpatient tx with oral antibiotics.  Presents with symptomatic nausea and vomiting and flank pain and gen abd pain.  Continue ceftriaxone, symptomatic tx.  Has acute kidney injury, continue IVF.  Has known left kidney mass that is followed by outpatient [facility] group.      Ovidio Rossi MD    Dictation # [id number]

## 2021-12-30 NOTE — ED TRIAGE NOTES
Diagnosed this morning with pyelonephritis at [facility]. Pain has gotten increasingly worse. Taking Zofran without help. N/V.

## 2021-12-31 LAB
ANION GAP SERPL CALCULATED.3IONS-SCNC: 6 MMOL/L (ref 3–14)
BACTERIA UR CULT: NO GROWTH
BUN SERPL-MCNC: 12 MG/DL (ref 7–30)
CALCIUM SERPL-MCNC: 7.6 MG/DL (ref 8.5–10.1)
CHLORIDE BLD-SCNC: 111 MMOL/L (ref 94–109)
CO2 SERPL-SCNC: 20 MMOL/L (ref 20–32)
CREAT SERPL-MCNC: 0.93 MG/DL (ref 0.52–1.04)
ERYTHROCYTE [DISTWIDTH] IN BLOOD BY AUTOMATED COUNT: 12.8 % (ref 10–15)
GFR SERPL CREATININE-BSD FRML MDRD: 81 ML/MIN/1.73M2
GLUCOSE BLD-MCNC: 78 MG/DL (ref 70–99)
HCT VFR BLD AUTO: 32.7 % (ref 35–47)
HGB BLD-MCNC: 10.7 G/DL (ref 11.7–15.7)
MCH RBC QN AUTO: 29.9 PG (ref 26.5–33)
MCHC RBC AUTO-ENTMCNC: 32.7 G/DL (ref 31.5–36.5)
MCV RBC AUTO: 91 FL (ref 78–100)
PLATELET # BLD AUTO: 106 10E3/UL (ref 150–450)
POTASSIUM BLD-SCNC: 3.6 MMOL/L (ref 3.4–5.3)
RBC # BLD AUTO: 3.58 10E6/UL (ref 3.8–5.2)
SODIUM SERPL-SCNC: 137 MMOL/L (ref 133–144)
WBC # BLD AUTO: 9.1 10E3/UL (ref 4–11)

## 2021-12-31 PROCEDURE — 99233 SBSQ HOSP IP/OBS HIGH 50: CPT | Performed by: INTERNAL MEDICINE

## 2021-12-31 PROCEDURE — 85027 COMPLETE CBC AUTOMATED: CPT | Performed by: INTERNAL MEDICINE

## 2021-12-31 PROCEDURE — 36415 COLL VENOUS BLD VENIPUNCTURE: CPT | Performed by: INTERNAL MEDICINE

## 2021-12-31 PROCEDURE — 250N000013 HC RX MED GY IP 250 OP 250 PS 637: Performed by: INTERNAL MEDICINE

## 2021-12-31 PROCEDURE — 250N000011 HC RX IP 250 OP 636: Performed by: INTERNAL MEDICINE

## 2021-12-31 PROCEDURE — 258N000003 HC RX IP 258 OP 636: Performed by: INTERNAL MEDICINE

## 2021-12-31 PROCEDURE — 120N000001 HC R&B MED SURG/OB

## 2021-12-31 PROCEDURE — 99221 1ST HOSP IP/OBS SF/LOW 40: CPT | Performed by: UROLOGY

## 2021-12-31 PROCEDURE — 80048 BASIC METABOLIC PNL TOTAL CA: CPT | Performed by: INTERNAL MEDICINE

## 2021-12-31 RX ORDER — ACETAMINOPHEN 325 MG/1
325-650 TABLET ORAL EVERY 4 HOURS PRN
Status: DISCONTINUED | OUTPATIENT
Start: 2021-12-31 | End: 2022-01-02 | Stop reason: HOSPADM

## 2021-12-31 RX ADMIN — HYDROMORPHONE HYDROCHLORIDE 0.2 MG: 0.2 INJECTION, SOLUTION INTRAMUSCULAR; INTRAVENOUS; SUBCUTANEOUS at 00:02

## 2021-12-31 RX ADMIN — HYDROMORPHONE HYDROCHLORIDE 0.2 MG: 0.2 INJECTION, SOLUTION INTRAMUSCULAR; INTRAVENOUS; SUBCUTANEOUS at 07:55

## 2021-12-31 RX ADMIN — CEFTRIAXONE SODIUM 1 G: 1 INJECTION, POWDER, FOR SOLUTION INTRAMUSCULAR; INTRAVENOUS at 23:28

## 2021-12-31 RX ADMIN — HYDROMORPHONE HYDROCHLORIDE 0.2 MG: 0.2 INJECTION, SOLUTION INTRAMUSCULAR; INTRAVENOUS; SUBCUTANEOUS at 15:13

## 2021-12-31 RX ADMIN — ONDANSETRON 4 MG: 2 INJECTION INTRAMUSCULAR; INTRAVENOUS at 12:28

## 2021-12-31 RX ADMIN — ONDANSETRON 4 MG: 2 INJECTION INTRAMUSCULAR; INTRAVENOUS at 20:02

## 2021-12-31 RX ADMIN — PROCHLORPERAZINE MALEATE 10 MG: 10 TABLET ORAL at 17:42

## 2021-12-31 RX ADMIN — HYDROMORPHONE HYDROCHLORIDE 0.2 MG: 0.2 INJECTION, SOLUTION INTRAMUSCULAR; INTRAVENOUS; SUBCUTANEOUS at 20:03

## 2021-12-31 RX ADMIN — ACETAMINOPHEN 650 MG: 325 TABLET, FILM COATED ORAL at 17:44

## 2021-12-31 RX ADMIN — METHYLPHENIDATE HYDROCHLORIDE 20 MG: 10 TABLET, EXTENDED RELEASE ORAL at 07:56

## 2021-12-31 RX ADMIN — HYDROMORPHONE HYDROCHLORIDE 0.2 MG: 0.2 INJECTION, SOLUTION INTRAMUSCULAR; INTRAVENOUS; SUBCUTANEOUS at 17:33

## 2021-12-31 RX ADMIN — HYDROMORPHONE HYDROCHLORIDE 0.2 MG: 0.2 INJECTION, SOLUTION INTRAMUSCULAR; INTRAVENOUS; SUBCUTANEOUS at 04:26

## 2021-12-31 RX ADMIN — HYDROMORPHONE HYDROCHLORIDE 0.2 MG: 0.2 INJECTION, SOLUTION INTRAMUSCULAR; INTRAVENOUS; SUBCUTANEOUS at 23:28

## 2021-12-31 RX ADMIN — CEFTRIAXONE SODIUM 1 G: 1 INJECTION, POWDER, FOR SOLUTION INTRAMUSCULAR; INTRAVENOUS at 00:13

## 2021-12-31 RX ADMIN — SODIUM CHLORIDE, POTASSIUM CHLORIDE, SODIUM LACTATE AND CALCIUM CHLORIDE: 600; 310; 30; 20 INJECTION, SOLUTION INTRAVENOUS at 04:26

## 2021-12-31 RX ADMIN — HYDROMORPHONE HYDROCHLORIDE 0.2 MG: 0.2 INJECTION, SOLUTION INTRAMUSCULAR; INTRAVENOUS; SUBCUTANEOUS at 12:29

## 2021-12-31 RX ADMIN — SODIUM CHLORIDE, POTASSIUM CHLORIDE, SODIUM LACTATE AND CALCIUM CHLORIDE: 600; 310; 30; 20 INJECTION, SOLUTION INTRAVENOUS at 12:38

## 2021-12-31 RX ADMIN — CETIRIZINE HYDROCHLORIDE 10 MG: 10 TABLET, FILM COATED ORAL at 07:57

## 2021-12-31 RX ADMIN — ONDANSETRON 4 MG: 2 INJECTION INTRAMUSCULAR; INTRAVENOUS at 04:26

## 2021-12-31 ASSESSMENT — ACTIVITIES OF DAILY LIVING (ADL)
ADLS_ACUITY_SCORE: 6
ADLS_ACUITY_SCORE: 4
ADLS_ACUITY_SCORE: 6
ADLS_ACUITY_SCORE: 4
ADLS_ACUITY_SCORE: 6
ADLS_ACUITY_SCORE: 4
ADLS_ACUITY_SCORE: 6
ADLS_ACUITY_SCORE: 4
ADLS_ACUITY_SCORE: 6

## 2021-12-31 NOTE — PROVIDER NOTIFICATION
MD Notification    Notified Person: MD    Notified Person Name: rhonda     Notification Date/Time: 12/31 1740    Notification Interaction: text-paged    Purpose of Notification:Pt requesting prn tylenol for a headache please advise, thanks!    Orders Received:    Comments:

## 2021-12-31 NOTE — PROGRESS NOTES
[facility]    Hospitalist Progress Note    Date of Service (when I saw the patient): 12/31/2021  Admit date: 12/29/2021    Assessment & Plan    Nel Durán is 36, who presents with a 2-week history of urinary tract infection symptoms that have been coming and going, more prominent in the last 4 days with cloudy urine. Within the 24 hours she presented she developed lower abdominal radiating up to her R flank, nausea and vomiting.     She went to [facility] where she was evaluated on 12/29.  Urine hCG was negative.UA:  SG > 1.3 with ketons. 30-49 WBC, many scquamous cells. 3-9 RBCs. I do not see that cx was done. Afebrile no leukocytosis, normal creatinine at that time. Discharged on ceftin. At presentation that evening to our hospital: WBC 17. T max 99.3, Cr up to 1.21.     Imaging at [facility] on 12/29:   CT Abdomen & Pelvis w/o Oral w/ IV   Final Result   IMPRESSION:   1. No acute inflammatory changes seen. Normal appendix visualized.   2. There is some nonspecific fluid within small bowel loops and within the right colon which could be seen in the setting of an enteritis. Correlate clinically.   3. 2.4 x 3 cm complex lesion of the left kidney. There could be a solid component in this lesion. Recommend nonemergent multiphase follow-up MRI of the kidneys for further evaluation.      Acute right-sided pyelonephritis  Urinary retention  No stone on CT She has had prior history of pyelonephritis in the past.    * Urine culture from [facility] showed up on 12/31/21: E.coli sensitive to ceftriaxone, bactrim. resistant to FQ  * Ultrasound of the kidneys done for GREG on 12/30/21 shows no hydronephrosis and known left kidney mass. Incidental note of free fluid or ascites within the peritoneum.     - Continue IV ceftriaxone  - Continues to have a lot of pain in the lower abdomen and up into the R kidney on 12/31/21, but improved from yesterday. No fevers. WBC  normalized.   - BCx at admission NGTD  - Morales placed on 12/30/21 due to difficulty voiding, with urinary retention.   - Given her history of this renal mass, the severity of her abdominal pain with free fluid on ultrasound and urinary retention. I am going to consult Urology. Do you recommend repeat imaging during this stay vs as outpatient. Her WBC and Cr have normalized which is reassuring.       Recent Labs   Lab 12/31/21  0738 12/30/21  1540 12/29/21  2342 12/29/21  2245   WBC 9.1 10.5  --  17.1*   LACT  --   --  0.7  --        GREG, RESOLVED with IVF hydration. Baseline Cr 0.8. Likely secondary to dehydration.    * Bladder ultrasound shows mass but no hydronephrosis or other abnormality.   * FENA 0.5%  Recent Labs   Lab 12/31/21  0738 12/30/21  1540 12/29/21  2245   CR 0.93 2.08* 1.21*     - advance diet, decrease IVF to LR 60 ml/h on 12/31/21.     Renal mass:  The patient was evaluated by [facility] for a number of years and noted the mass is a bit larger than previously. No recent follow up,  They had attempted biopsy in the past.   - Urology consult as above.     ADD (attention deficit disorder):  The patient is on Ritalin, which she takes only on a p.r.n. basis at work.    Depression:  The patient is not on any medication for this.    Covid19 negative  - Vaccination record not in Epic. Patient confirms she is fully vaccinated and boosted. Received at [facility].     Diet: Orders Placed This Encounter      Advance Diet as Tolerated: Clear Liquid Diet     IVF: LR down to 60 ml/h   Morales Catheter: PRESENT, indication:       DVT Prophylaxis: Pneumatic Compression Devices  Code Status: Full Code     Disposition: Expected discharge in 2-3 days.   Communication: Discussed with RN and patient on 12/30/21    Lexi Santos  Hospitalist Service  [facility]  Securely message with the Vocera Web Console (learn more here)  Text page via [employer] Paging/Directory    Interval History    Events over last 24 hours as outlined above.   Patient in a lot of pain lower abdomen radiation to right flank. Lots of burning with urinary. No fevers. No N/V.   Denies any SOB, CP, abdominal pain, N/V/D.     -Data reviewed today: I reviewed all new labs and imaging results over the last 24 hours. I personally reviewed no images or EKG's today.  Reviewed outside records.     Physical Exam   Temp: 98.2  F (36.8  C) Temp src: Oral BP: 126/68 Pulse: 80   Resp: 16 SpO2: 98 % O2 Device: None (Room air)    Vitals:    12/29/21 2233   Weight: 83.9 kg (185 lb)     Vital Signs with Ranges  Temp:  [98.2  F (36.8  C)-98.6  F (37  C)] 98.2  F (36.8  C)  Pulse:  [80-90] 80  Resp:  [16-20] 16  BP: (108-126)/(55-68) 126/68  SpO2:  [97 %-98 %] 98 %  I/O last 3 completed shifts:  In: 2709 [P.O.:300; I.V.:2159; IV Piggyback:250]  Out: 2570 [Urine:2570]    Today's Exam  Constitutional:  Appears uncomfortable and tired, NAD,   Neuropsyche:  Alert and oriented, answers questions appropriately.   Respiratory:  Breathing comfortably, good air exchange, no wheezes, no crackles.   Cardiovascular:  Regular rate and rhythm, no edema.  GI:  soft, TTP throughout lower abdomen without rebound, some guarding, BS present  Skin/Integumen:  No acute rash or sign of bleeding.     Medications   All medications reviewed on 12/31/21      lactated ringers 125 mL/hr at 12/31/21 0426       cefTRIAXone  1 g Intravenous Q24H     cetirizine  10 mg Oral Daily     methylphenidate  20 mg Oral Daily     polyethylene glycol  17 g Oral Daily     sodium chloride (PF)  3 mL Intracatheter Q8H       Data   Recent Labs   Lab 12/31/21  0738 12/30/21  1540 12/29/21  2245   WBC 9.1 10.5 17.1*   HGB 10.7* 10.8* 13.0   MCV 91 92 92   * 115* 147*    138 137   POTASSIUM 3.6 4.0 3.9   CHLORIDE 111* 111* 109   CO2 20 21 21   BUN 12 22 17   CR 0.93 2.08* 1.21*   ANIONGAP 6 6 7   CADEN 7.6* 7.6* 8.3*   GLC 78 84 119*   ALBUMIN  --  2.7*  --    PROTTOTAL  --  5.8*  --     BILITOTAL  --  0.4  --    ALKPHOS  --  38*  --    ALT  --  16  --    AST  --  10  --        Recent Results (from the past 24 hour(s))   US Renal Complete    Narrative    EXAM: US RENAL COMPLETE  LOCATION: [facility]  DATE/TIME: 12/30/2021 5:44 PM    INDICATION: Flank pain. Evaluate for hydronephrosis.  COMPARISON: The CT from 3/27/2018.  TECHNIQUE: Routine Bilateral Renal and Bladder Ultrasound.    FINDINGS:    RIGHT KIDNEY: 10.6 x 5.9 x 4.9 cm. Normal parenchymal echogenicity without hydronephrosis.     LEFT KIDNEY: 11.0 x 5.1 x 5.4 cm. Normal parenchymal echogenicity without hydronephrosis. Within the lower pole of the left kidney is a hypoechoic solid lesion with internal vascular flow measuring approximately 2.4 x 2.1 x 2.3 cm. This is indeterminant.       BLADDER: Normal.      Impression    IMPRESSION:  1.  Approximately 2.4 cm solid mass within the inferior left kidney. Of note on the CT from 3/27/2018 a mass was biopsied in the left kidney.  2.  No additional renal or bladder abnormality.  3.  Incidental note is made of free fluid/ascites within the peritoneum.    NOTE: ABNORMAL REPORT    THE DICTATION ABOVE DESCRIBES AN ABNORMALITY FOR WHICH FOLLOW-UP IS NEEDED.

## 2021-12-31 NOTE — PLAN OF CARE
A/O x4. VSS on RA. Up SBA. C/o 9/10 pain, dilaudid given. C/o nausea, zofran and compazine given. Tolerating clears, poor appetite. Unable to void this shift. Morales placed, over 1000 out.

## 2021-12-31 NOTE — CONSULTS
Urology Consult    Name: Nel Durán    MRN: [id number]   YOB: 1985       We were asked to see Nel Durán at the request of Dr. Santos for evaluation and treatment of the following chief complaint.        Chief Complaint:   Pyelopnephritis with urinary retention  Left renal mass    History is obtained from the patient          History of Present Illness:   Nel Durán is a 36 year old female with a PMH of ADD, hysterectomy, and urologic history of a small renal mass and cystorrhaphy during laparoscopic ABDULLAHI in 2015 who is admitted with a UTI. The patient had malodorous urine and abdominal and flank pain for one week, which prompted her presentation to the [facility] ED two days ago. At that time UA was concerning for infection. She was discharged on cefdinir but had persistent symptoms and presented to [facility] ED the evening of 12/29. Upon admission she was afebrile, and workup was notable for leukocytosis and GREG. She was started on ceftriaxone and admitted for treatment of pyelonephritis. Yesterday the patient developed urinary retention and gonzalez catheter was placed with 1L of immediate output. Cr peaked yesterday to 2.1, but has normalized today. She feels a lot better since the gonzalez catheter was placed, but still has some persistent abd pain and R flank pain. The UCx from [facility] is growing >100k E. Coli, sensitive to ceftriaxone.     Regarding the patient's renal mass, she was evaluated by Dr. Aranda in 2018. At that time the mass was 1.5cm and it was unclear if it was cystic or solid based on CT, CHANG, and MRI. A percutaneous biopsy was performed which was non-diagnostic. The plan at that time was to follow up with CHANG in 6 months but the patient did not follow up.           Past Medical History:     Past Medical History:   Diagnosis Date     ADD (attention deficit disorder)      ASCUS favor benign 2/2014    neg hpv cotest in 3 yrs     depression 2000     Not currently on meds     Depressive disorder      History of blood transfusion 4/2013    6 units after hysterectomy     Menarche age 12     Pelvic pain             Past Surgical History:     Past Surgical History:   Procedure Laterality Date     BIOPSY       BLADDER SURGERY       C/SECTION, LOW TRANSVERSE        x2     CERVICECTOMY (TRACHELECTOMY) N/A 1/9/2015    not done     COLONOSCOPY  6/12/2014    Procedure: COMBINED COLONOSCOPY, SINGLE BIOPSY/POLYPECTOMY BY BIOPSY;  Surgeon: Elizabeth Shepard MD;  Location: [facility]     CYSTOSCOPY  4/5/2013    Procedure: CYSTOSCOPY;;  Surgeon: Mariel Smith MD;  Location: [facility]     LABIALPLASTY  4/4/2013    Procedure: LABIALPLASTY;;  Surgeon: Leticia Staley MD;  [facility]     LAPAROSCOPIC HYSTERECTOMY SUPRACERVICAL  4/4/2013    simple hyperplasiaProcedure: LAPAROSCOPIC HYSTERECTOMY SUPRACERVICAL;  Laparoscopic Supracervical Hysterectomy, left labialplasty;  Surgeon: Leticia Staley MD;  [facility]     LAPAROSCOPIC LYSIS ADHESIONS N/A 1/9/2015    Procedure: LAPAROSCOPIC LYSIS ADHESIONS;  Surgeon: Sue Johnston MD;  [facility]     LAPAROSCOPIC TUBAL LIGATION  3/7/2013    Procedure: LAPAROSCOPIC TUBAL LIGATION;  Bilateral Laparoscopic Tubal Ligation With Intrauterine Device Removal ;  Surgeon: Jaime Patrick MD;  [facility]     LAPAROSCOPY DIAGNOSTIC (GYN)  4/5/2013    Procedure: LAPAROSCOPY DIAGNOSTIC (GYN);;  Surgeon: Mariel Smith MD;  [facility]     LAPAROSCOPY OPERATIVE ADULT N/A 1/9/2015    Procedure: LAPAROSCOPY OPERATIVE ADULT;  Surgeon: Sue Johnston MD;  [facility]     LAPAROTOMY EXPLORATORY  4/5/2013    Procedure: LAPAROTOMY EXPLORATORY;  ligasure of pedicles and cervical stump;  Surgeon: Mariel Smith MD;  Location[facility]            Social History:     Social History     Tobacco Use     Smoking status: Never Smoker     Smokeless tobacco: Never Used   Substance Use Topics      Alcohol use: No     Comment: social            Family History:     Family History   Problem Relation Age of Onset     Gastrointestinal Disease Mother         colon polyps     Hypertension Mother      Gastrointestinal Disease Father         colon polyps     Family History Negative Other      Cancer - colorectal Maternal Grandmother         thinks grandparents on both sides had colon cancer, unsure of details     Cancer - colorectal Paternal Grandmother      Substance Abuse Brother             Allergies:     Allergies   Allergen Reactions     Gluten Meal      Iodine             Medications:     Current Facility-Administered Medications   Medication     cefTRIAXone (ROCEPHIN) 1 g vial to attach to  mL bag for ADULTS or NS 50 mL bag for PEDS     cetirizine (zyrTEC) tablet 10 mg     HYDROmorphone (DILAUDID) injection 0.2 mg     lactated ringers BOLUS 250 mL     lactated ringers infusion     lidocaine (LMX4) cream     lidocaine 1 % 0.1-1 mL     melatonin tablet 1 mg     methylphenidate (METADATE ER) CR tablet 20 mg     naloxone (NARCAN) injection 0.2 mg    Or     naloxone (NARCAN) injection 0.4 mg    Or     naloxone (NARCAN) injection 0.2 mg    Or     naloxone (NARCAN) injection 0.4 mg     ondansetron (ZOFRAN-ODT) ODT tab 4 mg    Or     ondansetron (ZOFRAN) injection 4 mg     polyethylene glycol (MIRALAX) Packet 17 g     prochlorperazine (COMPAZINE) injection 10 mg    Or     prochlorperazine (COMPAZINE) tablet 10 mg    Or     prochlorperazine (COMPAZINE) suppository 25 mg     sodium chloride (PF) 0.9% PF flush 3 mL     sodium chloride (PF) 0.9% PF flush 3 mL             Review of Systems:    ROS: 10 point ROS neg other than the symptoms noted above in the HPI           Physical Exam:   VS:  T: 98.2    HR: 80    BP: 126/68    RR: 16   GEN:  AOx3.  NAD.    CV:  RRR  LUNGS: Non-labored breathing.   BACK:  R CVAT  ABD:  Soft. Mild TTP predominantly in LLQ and suprapubic area  EXT:  Warm, well perfused.   SKIN:   Warm.  Dry.  No rashes.  NEURO:  CN grossly intact.            Data:   All laboratory data reviewed:    Recent Labs   Lab 12/31/21  0738 12/30/21  1540 12/29/21  2245   WBC 9.1 10.5 17.1*   HGB 10.7* 10.8* 13.0   * 115* 147*     Recent Labs   Lab 12/31/21  0738 12/30/21  1540 12/29/21  2245    138 137   POTASSIUM 3.6 4.0 3.9   CHLORIDE 111* 111* 109   CO2 20 21 21   BUN 12 22 17   CR 0.93 2.08* 1.21*   GLC 78 84 119*   CADEN 7.6* 7.6* 8.3*     Recent Labs   Lab 12/30/21  2228   COLOR Orange*   APPEARANCE Cloudy*   URINEGLC 50 *   URINEBILI Negative   URINEKETONE 20 *   SG 1.023   URINEPH 6.5   PROTEIN 300 *   NITRITE Negative   LEUKEST Large*   RBCU >182*   WBCU >182*       All pertinent imaging reviewed:  CT scan of the abdomen:  [facility] 12/29 CT A/P    IMPRESSION:   1.  No acute inflammatory changes seen. Normal appendix visualized.   2.  There is some nonspecific fluid within small bowel loops and within the right colon which could be seen in the setting of an enteritis. Correlate clinically.   3.  2.4 x 3 cm complex lesion of the left kidney. There could be a solid component in this lesion. Recommend nonemergent multiphase follow-up MRI of the kidneys for further evaluation.            Impression and Plan:   Impression:   Nel Durán is a 36 year old female with a PMH of ADD, hysterectomy, and urologic history of a 1.5cm renal mass and cystorrhaphy during laparoscopic ABDULLAHI in 2015 who is admitted with a UTI and urinary retention. Her renal mass has also increased from 1.5cm --> 3cm over the past three years. There does appear to be a solid component based on the CT done at [facility], however that study was not dual phase, and therefore she should undergo an MRI at some point to better characterize the mass. There was no other identifiable cause of the patient's abdominal/flank pain on the 12/29 CT - hopefully this is just due to pyelonephritis and urinary retention, and will  continue to improve with bladder drainage.       Plan:   UTI complicated by urinary retention  -Continue culture-directed antibiotics, defer duration to primary team  - Avoid anticholinergic, antihistamine, and alpha-agonist medications as these will exacerbate urinary retention  - Minimize narcotic pain medication regimen as tolerated  - Send UA/UC to rule out UTI as etiology of urinary retention  - Increased ambulation/mobility will also usually help with improvement in the degree of urinary retention    - Recommend waiting least 48 hours after the gonzalez catheter was placed before performing trial of void. Measure PVR bladder scans after gonzalez removal to ensure the patient is emptying completely.   - If the patient fails trial of void she is okay with being discharged with CIC.  - If CIC is needed, then the patient will need nursing instruction on proper self-intermittent catheterization technique.  - Remind patient that CIC should be performed ONLY after an attempt at spontaneous voiding is made  - Frequency of SIC can be determined based on the average post-void residual:  If PVR < 150cc - no cathing needed  If PVR is 150-250cc - cath bid (qam & qhs)  If PVR is 251-350cc - cath tid  If PVR is 351-450cc - cath qid  If PVR is > 450cc - cath every 4 hours  - The patient will need to keep a journal of his cathing regimen after discharge (will need to include frequency of cathing and post-void residual amount obtained from CIC) and bring this to clinic for review     Renal mass  -Left mid-pole renal mass has increased in size from 1.5cm to 3cm over three years.  -Mass appears to have a solid component, however pt should have a renal mass MRI to better characterize.  -Pt will need renal mass MRI to characterize mass. This can be performed this admission or as an outpatient if the patient is clinically improving.      This patient's exam findings, labs, and imaging discussed with urology staff surgeon Dr. Aranda, who  developed the treatment plan.    Christopher Lay MD  Urology Resident

## 2021-12-31 NOTE — PLAN OF CARE
A&Ox4, VSS on RA. Up SBA. Continues to experience severe pain in abdomen, and back. Dilaudid given x2, zofran given x1. Morales placed in evening, large amount of output since placement. Continues on Int IV ABX. No plan for discharge at this time.

## 2022-01-01 ENCOUNTER — APPOINTMENT (OUTPATIENT)
Dept: MRI IMAGING | Facility: CLINIC | Age: 37
End: 2022-01-01
Attending: STUDENT IN AN ORGANIZED HEALTH CARE EDUCATION/TRAINING PROGRAM
Payer: COMMERCIAL

## 2022-01-01 LAB — BACTERIA UR CULT: NO GROWTH

## 2022-01-01 PROCEDURE — 250N000013 HC RX MED GY IP 250 OP 250 PS 637: Performed by: STUDENT IN AN ORGANIZED HEALTH CARE EDUCATION/TRAINING PROGRAM

## 2022-01-01 PROCEDURE — 255N000002 HC RX 255 OP 636: Performed by: INTERNAL MEDICINE

## 2022-01-01 PROCEDURE — 258N000003 HC RX IP 258 OP 636: Performed by: INTERNAL MEDICINE

## 2022-01-01 PROCEDURE — 120N000001 HC R&B MED SURG/OB

## 2022-01-01 PROCEDURE — 74183 MRI ABD W/O CNTR FLWD CNTR: CPT

## 2022-01-01 PROCEDURE — A9585 GADOBUTROL INJECTION: HCPCS | Performed by: INTERNAL MEDICINE

## 2022-01-01 PROCEDURE — 250N000011 HC RX IP 250 OP 636: Performed by: INTERNAL MEDICINE

## 2022-01-01 PROCEDURE — 99232 SBSQ HOSP IP/OBS MODERATE 35: CPT | Performed by: STUDENT IN AN ORGANIZED HEALTH CARE EDUCATION/TRAINING PROGRAM

## 2022-01-01 PROCEDURE — 250N000013 HC RX MED GY IP 250 OP 250 PS 637: Performed by: INTERNAL MEDICINE

## 2022-01-01 RX ORDER — HYDROMORPHONE HYDROCHLORIDE 1 MG/ML
0.5 INJECTION, SOLUTION INTRAMUSCULAR; INTRAVENOUS; SUBCUTANEOUS
Status: DISCONTINUED | OUTPATIENT
Start: 2022-01-01 | End: 2022-01-02

## 2022-01-01 RX ORDER — LORAZEPAM 1 MG/1
1 TABLET ORAL ONCE
Status: COMPLETED | OUTPATIENT
Start: 2022-01-01 | End: 2022-01-01

## 2022-01-01 RX ORDER — ACETAMINOPHEN 325 MG/1
975 TABLET ORAL EVERY 6 HOURS
Status: DISCONTINUED | OUTPATIENT
Start: 2022-01-01 | End: 2022-01-02 | Stop reason: HOSPADM

## 2022-01-01 RX ORDER — AMOXICILLIN 250 MG
1 CAPSULE ORAL 2 TIMES DAILY
Status: DISCONTINUED | OUTPATIENT
Start: 2022-01-01 | End: 2022-01-02 | Stop reason: HOSPADM

## 2022-01-01 RX ORDER — GADOBUTROL 604.72 MG/ML
9 INJECTION INTRAVENOUS ONCE
Status: COMPLETED | OUTPATIENT
Start: 2022-01-01 | End: 2022-01-01

## 2022-01-01 RX ADMIN — HYDROMORPHONE HYDROCHLORIDE 0.5 MG: 1 INJECTION, SOLUTION INTRAMUSCULAR; INTRAVENOUS; SUBCUTANEOUS at 08:28

## 2022-01-01 RX ADMIN — ONDANSETRON 4 MG: 4 TABLET, ORALLY DISINTEGRATING ORAL at 11:33

## 2022-01-01 RX ADMIN — HYDROMORPHONE HYDROCHLORIDE 0.5 MG: 1 INJECTION, SOLUTION INTRAMUSCULAR; INTRAVENOUS; SUBCUTANEOUS at 11:35

## 2022-01-01 RX ADMIN — PROCHLORPERAZINE EDISYLATE 10 MG: 5 INJECTION INTRAMUSCULAR; INTRAVENOUS at 22:59

## 2022-01-01 RX ADMIN — SENNOSIDES AND DOCUSATE SODIUM 1 TABLET: 50; 8.6 TABLET ORAL at 13:17

## 2022-01-01 RX ADMIN — ACETAMINOPHEN 975 MG: 325 TABLET, FILM COATED ORAL at 13:17

## 2022-01-01 RX ADMIN — HYDROMORPHONE HYDROCHLORIDE 0.2 MG: 0.2 INJECTION, SOLUTION INTRAMUSCULAR; INTRAVENOUS; SUBCUTANEOUS at 05:18

## 2022-01-01 RX ADMIN — HYDROMORPHONE HYDROCHLORIDE 0.5 MG: 1 INJECTION, SOLUTION INTRAMUSCULAR; INTRAVENOUS; SUBCUTANEOUS at 15:37

## 2022-01-01 RX ADMIN — ACETAMINOPHEN 975 MG: 325 TABLET, FILM COATED ORAL at 19:41

## 2022-01-01 RX ADMIN — POLYETHYLENE GLYCOL 3350 17 G: 17 POWDER, FOR SOLUTION ORAL at 08:29

## 2022-01-01 RX ADMIN — HYDROMORPHONE HYDROCHLORIDE 1 MG: 1 INJECTION, SOLUTION INTRAMUSCULAR; INTRAVENOUS; SUBCUTANEOUS at 05:52

## 2022-01-01 RX ADMIN — PROCHLORPERAZINE MALEATE 10 MG: 10 TABLET ORAL at 08:32

## 2022-01-01 RX ADMIN — CEFTRIAXONE SODIUM 1 G: 1 INJECTION, POWDER, FOR SOLUTION INTRAMUSCULAR; INTRAVENOUS at 23:10

## 2022-01-01 RX ADMIN — HYDROMORPHONE HYDROCHLORIDE 0.2 MG: 0.2 INJECTION, SOLUTION INTRAMUSCULAR; INTRAVENOUS; SUBCUTANEOUS at 02:54

## 2022-01-01 RX ADMIN — LORAZEPAM 1 MG: 1 TABLET ORAL at 12:15

## 2022-01-01 RX ADMIN — ONDANSETRON 4 MG: 2 INJECTION INTRAMUSCULAR; INTRAVENOUS at 05:24

## 2022-01-01 RX ADMIN — SODIUM CHLORIDE, POTASSIUM CHLORIDE, SODIUM LACTATE AND CALCIUM CHLORIDE: 600; 310; 30; 20 INJECTION, SOLUTION INTRAVENOUS at 05:18

## 2022-01-01 RX ADMIN — ACETAMINOPHEN 975 MG: 325 TABLET, FILM COATED ORAL at 08:29

## 2022-01-01 RX ADMIN — HYDROMORPHONE HYDROCHLORIDE 0.5 MG: 1 INJECTION, SOLUTION INTRAMUSCULAR; INTRAVENOUS; SUBCUTANEOUS at 22:59

## 2022-01-01 RX ADMIN — GADOBUTROL 9 ML: 604.72 INJECTION INTRAVENOUS at 12:56

## 2022-01-01 RX ADMIN — HYDROMORPHONE HYDROCHLORIDE 0.5 MG: 1 INJECTION, SOLUTION INTRAMUSCULAR; INTRAVENOUS; SUBCUTANEOUS at 20:47

## 2022-01-01 RX ADMIN — METHYLPHENIDATE HYDROCHLORIDE 20 MG: 10 TABLET, EXTENDED RELEASE ORAL at 08:29

## 2022-01-01 RX ADMIN — CETIRIZINE HYDROCHLORIDE 10 MG: 10 TABLET, FILM COATED ORAL at 08:29

## 2022-01-01 RX ADMIN — SENNOSIDES AND DOCUSATE SODIUM 1 TABLET: 50; 8.6 TABLET ORAL at 20:47

## 2022-01-01 ASSESSMENT — ACTIVITIES OF DAILY LIVING (ADL)
ADLS_ACUITY_SCORE: 6

## 2022-01-01 ASSESSMENT — MIFFLIN-ST. JEOR: SCORE: 1579.47

## 2022-01-01 NOTE — PROGRESS NOTES
MD Notification    Notified Person: MD    Notified Person Name: Erick Sánchez    Notification Date/Time: 1/1/22 @ 0540    Notification Interaction: [employer]    Purpose of Notification: Dilaudid ineffective for pt's pain throughout the night. Can we try anything else for her? Thanks!     Orders Received: 1mg IV dilaudid once; 0.5mg IV dilaudid q2h.    Comments:

## 2022-01-01 NOTE — PLAN OF CARE
A/Ox4. VSS on RA. C/o pain and nausea, managed w/ zofran x2 and dilaudid x4. L PIV infusing LR @ 60ml/hr w/ int abx. Morales in place w/ good UOP. Regular diet, poor appetite. Discharge pending clinical improvement.

## 2022-01-01 NOTE — PROGRESS NOTES
[facility]    Hospitalist Progress Note    Date of Service (when I saw the patient): 01/01/2022  Admit date: 12/29/2021    Assessment & Plan    Nel Durán is 36, who was recently tx for a urinary tract infection 12/29 at [facility] who presented to the ER for worsening pain, abnormal UA, GREG and concern for pyelonephritis    Acute right-sided pyelonephritis  Urinary retention  She has had prior history of pyelonephritis in the past.    * Urine culture from [facility] showed up on 12/31/21: E.coli sensitive to ceftriaxone, bactrim. resistant to FQ  * Ultrasound of the kidneys done for GREG on 12/30/21 shows no hydronephrosis and known left kidney mass. Incidental note of free fluid or ascites within the peritoneum.   - culture here remain negative    - Continues to have a lot of pain however acute infection appears to have improved with normal WBC and no fevers.  - Continue IV ceftriaxone for now  - Gonzalez placed on 12/30/21 due to difficulty voiding, with urinary retention, urology consulted and recommended voiding trial today. Plan to discontinue gonzalez.    GREG, RESOLVED with IVF hydration. Baseline Cr 0.8. Likely secondary to dehydration.    * Bladder ultrasound shows mass but no hydronephrosis or other abnormality.   - advance diet, continue IVF     Renal mass:  The patient was evaluated by [facility] for a number of years for this mass with unremarkable biopsy in the past. Urology consulted and yesterday and recommend Renal MRI and will need outpatient follow up    Imaging at [facility] on 12/29:   CT Abdomen & Pelvis w/o Oral w/ IV   Final Result   IMPRESSION:   1. No acute inflammatory changes seen. Normal appendix visualized.   2. There is some nonspecific fluid within small bowel loops and within the right colon which could be seen in the setting of an enteritis. Correlate clinically.   3. 2.4 x 3 cm complex lesion of the left kidney. There could be a solid  component in this lesion. Recommend nonemergent multiphase follow-up MRI of the kidneys for further evaluation.       ADD (attention deficit disorder):  The patient is on Ritalin, which she takes only on a p.r.n. basis at work.    Depression:  The patient is not on any medication for this.    Covid19 negative  - Vaccination record not in Epic. Patient confirms she is fully vaccinated and boosted. Received at [facility].     Diet: Orders Placed This Encounter      Regular Diet Adult    Morales Catheter: PRESENT, indication: Retention     DVT Prophylaxis: Pneumatic Compression Devices  Code Status: Full Code     Disposition: 1-2 days pending pain control and trending urinary retention  Communication: Discussed with RN and patient on 12/30/21    [facility]  Securely message with the Vocera Web Console (learn more here)  Text page via [employer] Paging/Directory    Interval History   Patient had increased pain early in the morning and IV dilaudid increased. + nausea without vomiting. No BM in several days. No fevers. Despite her reports of pain, abdomen soft and no guarding    -Data reviewed today: I reviewed all new labs and imaging results over the last 24 hours. I personally reviewed no images or EKG's today.  Reviewed outside records.     Physical Exam   Temp: 98.3  F (36.8  C) Temp src: Oral BP: 102/68 Pulse: 81   Resp: 18 SpO2: 98 % O2 Device: None (Room air)    Vitals:    12/29/21 2233 01/01/22 0518   Weight: 83.9 kg (185 lb) 89.4 kg (197 lb)     Vital Signs with Ranges  Temp:  [98.3  F (36.8  C)-99.4  F (37.4  C)] 98.3  F (36.8  C)  Pulse:  [78-83] 81  Resp:  [16-18] 18  BP: (102-126)/(58-73) 102/68  SpO2:  [98 %-99 %] 98 %  I/O last 3 completed shifts:  In: -   Out: 2900 [Urine:2900]    Constitutional:  awake and alert, NAD  Neuropsyche:  Alert and oriented, answers questions appropriately.   Respiratory:  Breathing comfortably, good air exchange, no  wheezes, no crackles.   Cardiovascular:  Regular rate and rhythm, no edema.  GI:  soft, TTP throughout lower abdomen without rebound or guarding  Skin/Integumen:  No acute rash    Medications   All medications reviewed on 12/31/21      lactated ringers 60 mL/hr at 01/01/22 0518       acetaminophen  975 mg Oral Q6H     cefTRIAXone  1 g Intravenous Q24H     cetirizine  10 mg Oral Daily     LORazepam  1 mg Oral Once     methylphenidate  20 mg Oral Daily     polyethylene glycol  17 g Oral Daily     sodium chloride (PF)  3 mL Intracatheter Q8H       Data   Recent Labs   Lab 12/31/21  0738 12/30/21  1540 12/29/21  2245   WBC 9.1 10.5 17.1*   HGB 10.7* 10.8* 13.0   MCV 91 92 92   * 115* 147*    138 137   POTASSIUM 3.6 4.0 3.9   CHLORIDE 111* 111* 109   CO2 20 21 21   BUN 12 22 17   CR 0.93 2.08* 1.21*   ANIONGAP 6 6 7   CADEN 7.6* 7.6* 8.3*   GLC 78 84 119*   ALBUMIN  --  2.7*  --    PROTTOTAL  --  5.8*  --    BILITOTAL  --  0.4  --    ALKPHOS  --  38*  --    ALT  --  16  --    AST  --  10  --        No results found for this or any previous visit (from the past 24 hour(s)).

## 2022-01-01 NOTE — PLAN OF CARE
A&Ox4. VSS on RA. C/o of abd pain prn dilaudid given. C/o of a headache prn tylenol given. Zofran and compazine given for nausea. Morales patent with good UOP. Regular diet poor appetite. PIV infusing IVF with intermittent antibiotics. SBA.

## 2022-01-02 VITALS
TEMPERATURE: 98.4 F | RESPIRATION RATE: 16 BRPM | WEIGHT: 193 LBS | SYSTOLIC BLOOD PRESSURE: 117 MMHG | BODY MASS INDEX: 32.15 KG/M2 | OXYGEN SATURATION: 97 % | HEART RATE: 81 BPM | DIASTOLIC BLOOD PRESSURE: 68 MMHG | HEIGHT: 65 IN

## 2022-01-02 PROCEDURE — 250N000013 HC RX MED GY IP 250 OP 250 PS 637: Performed by: INTERNAL MEDICINE

## 2022-01-02 PROCEDURE — 99239 HOSP IP/OBS DSCHRG MGMT >30: CPT | Performed by: STUDENT IN AN ORGANIZED HEALTH CARE EDUCATION/TRAINING PROGRAM

## 2022-01-02 PROCEDURE — 250N000013 HC RX MED GY IP 250 OP 250 PS 637: Performed by: STUDENT IN AN ORGANIZED HEALTH CARE EDUCATION/TRAINING PROGRAM

## 2022-01-02 PROCEDURE — 258N000003 HC RX IP 258 OP 636: Performed by: INTERNAL MEDICINE

## 2022-01-02 PROCEDURE — 250N000011 HC RX IP 250 OP 636: Performed by: INTERNAL MEDICINE

## 2022-01-02 RX ORDER — HYDROMORPHONE HYDROCHLORIDE 1 MG/ML
0.5 INJECTION, SOLUTION INTRAMUSCULAR; INTRAVENOUS; SUBCUTANEOUS
Status: DISCONTINUED | OUTPATIENT
Start: 2022-01-02 | End: 2022-01-02 | Stop reason: HOSPADM

## 2022-01-02 RX ORDER — ONDANSETRON 4 MG/1
4 TABLET, ORALLY DISINTEGRATING ORAL EVERY 6 HOURS PRN
Qty: 10 TABLET | Refills: 0 | Status: SHIPPED | OUTPATIENT
Start: 2022-01-02 | End: 2022-03-04

## 2022-01-02 RX ORDER — SULFAMETHOXAZOLE/TRIMETHOPRIM 800-160 MG
1 TABLET ORAL 2 TIMES DAILY
Qty: 14 TABLET | Refills: 0 | Status: SHIPPED | OUTPATIENT
Start: 2022-01-02 | End: 2022-01-09

## 2022-01-02 RX ORDER — OXYCODONE HYDROCHLORIDE 5 MG/1
5-10 TABLET ORAL EVERY 4 HOURS PRN
Qty: 10 TABLET | Refills: 0 | Status: SHIPPED | OUTPATIENT
Start: 2022-01-02 | End: 2022-03-04

## 2022-01-02 RX ORDER — SULFAMETHOXAZOLE/TRIMETHOPRIM 800-160 MG
1 TABLET ORAL 2 TIMES DAILY
Status: DISCONTINUED | OUTPATIENT
Start: 2022-01-02 | End: 2022-01-02 | Stop reason: HOSPADM

## 2022-01-02 RX ORDER — OXYCODONE HYDROCHLORIDE 5 MG/1
5-10 TABLET ORAL EVERY 4 HOURS PRN
Status: DISCONTINUED | OUTPATIENT
Start: 2022-01-02 | End: 2022-01-02 | Stop reason: HOSPADM

## 2022-01-02 RX ADMIN — HYDROMORPHONE HYDROCHLORIDE 0.5 MG: 1 INJECTION, SOLUTION INTRAMUSCULAR; INTRAVENOUS; SUBCUTANEOUS at 04:34

## 2022-01-02 RX ADMIN — ONDANSETRON 4 MG: 4 TABLET, ORALLY DISINTEGRATING ORAL at 04:34

## 2022-01-02 RX ADMIN — SENNOSIDES AND DOCUSATE SODIUM 1 TABLET: 50; 8.6 TABLET ORAL at 08:05

## 2022-01-02 RX ADMIN — SODIUM CHLORIDE, POTASSIUM CHLORIDE, SODIUM LACTATE AND CALCIUM CHLORIDE: 600; 310; 30; 20 INJECTION, SOLUTION INTRAVENOUS at 00:19

## 2022-01-02 RX ADMIN — ACETAMINOPHEN 975 MG: 325 TABLET, FILM COATED ORAL at 08:05

## 2022-01-02 RX ADMIN — SULFAMETHOXAZOLE AND TRIMETHOPRIM 1 TABLET: 800; 160 TABLET ORAL at 10:51

## 2022-01-02 RX ADMIN — POLYETHYLENE GLYCOL 3350 17 G: 17 POWDER, FOR SOLUTION ORAL at 08:05

## 2022-01-02 RX ADMIN — METHYLPHENIDATE HYDROCHLORIDE 20 MG: 10 TABLET, EXTENDED RELEASE ORAL at 08:05

## 2022-01-02 RX ADMIN — CETIRIZINE HYDROCHLORIDE 10 MG: 10 TABLET, FILM COATED ORAL at 08:05

## 2022-01-02 RX ADMIN — OXYCODONE HYDROCHLORIDE 5 MG: 5 TABLET ORAL at 08:05

## 2022-01-02 ASSESSMENT — ACTIVITIES OF DAILY LIVING (ADL)
ADLS_ACUITY_SCORE: 6
ADLS_ACUITY_SCORE: 8
ADLS_ACUITY_SCORE: 6
ADLS_ACUITY_SCORE: 8
ADLS_ACUITY_SCORE: 6
ADLS_ACUITY_SCORE: 8
ADLS_ACUITY_SCORE: 6
ADLS_ACUITY_SCORE: 8
ADLS_ACUITY_SCORE: 6

## 2022-01-02 ASSESSMENT — MIFFLIN-ST. JEOR: SCORE: 1561.32

## 2022-01-02 NOTE — PLAN OF CARE
[id number]  A/Ox4. VSS on RA. C/o pain managed w/ prn  dilaudid x2.1 Emesis this shift w/ int nausea managed w/ prn compazine & zofran . L PIV infusing LR @ 60ml/hr. Cont b/b - up SBA to bathroom. Voiding adequately. Discharge pending pain control.

## 2022-01-02 NOTE — PLAN OF CARE
A&Ox4. VSS on RA. C/o pain and nausea managed w/ dilaudid, zofran and compazine. L PIV infusing IVF. Regular diet, poor appetite. Morales removed at 1300 voiding adequately. SBA.

## 2022-01-02 NOTE — DISCHARGE SUMMARY
[facility]  Hospitalist Discharge Summary      Date of Admission:  12/29/2021  Date of Discharge:  1/2/2022  Discharging Provider: Maryse Burton, DO      Discharge Diagnoses   See below    Follow-ups Needed After Discharge   Follow-up Appointments     Follow-up and recommended labs and tests       Follow up with primary care provider, Jovanny Zayas, within 7   days for hospital follow- up.  No follow up labs or test are needed.    Follow up with [place] [facility] to assess mass and need for biopsy. Number   for appcarol  [phone]             Unresulted Labs Ordered in the Past 30 Days of this Admission     Date and Time Order Name Status Description    12/29/2021 11:28 PM Blood Culture Peripheral Blood Preliminary     12/29/2021 11:28 PM Blood Culture Peripheral Blood Preliminary       These results will be followed up by PCP    Discharge Disposition   Discharged to home  Condition at discharge: Stable      Hospital Course   Nel Durán is 36, who was recently tx for a urinary tract infection 12/29 at [facility] who presented to the ER for worsening pain, abnormal UA, GREG and concern for pyelonephritis. She was started on IV antibiotics. Cultures on admission obtained while on antibiotics and remained negative during admission. Patient developed urinary retention while in the hospital requiring gonzalez placement. Gonzalez removed on HD 3 and patient urinated without difficulty. She will discharge with antibiotics to complete 10 days of treatment.  She also underwent Renal MRI for incidentally noted renal mass which increased in size from previous imaging. It is reccommended she have follow up with urology on discharge to discuss mass and possible biopsy.    Detailed problems below.      Acute right-sided pyelonephritis  Urinary retention  She has had prior history of pyelonephritis in the past.    * Urine culture from [facility] showed up on 12/31/21: E.coli sensitive to  ceftriaxone, bactrim. resistant to FQ  * Ultrasound of the kidneys done for GREG on 12/30/21 shows no hydronephrosis and known left kidney mass. Incidental note of free fluid or ascites within the peritoneum.   - culture here remain negative  - discharge with bactrim BID     GREG, RESOLVED with IVF hydration. Baseline Cr 0.8. Likely secondary to dehydration.    * Bladder ultrasound shows mass but no hydronephrosis or other abnormality.      Renal mass:  The patient was evaluated by [facility] for a number of years for this mass with unremarkable biopsy in the past. Urology will see in clinic.     Imaging at [facility] on 12/29:   CT Abdomen & Pelvis w/o Oral w/ IV   Final Result   IMPRESSION:   1. No acute inflammatory changes seen. Normal appendix visualized.   2. There is some nonspecific fluid within small bowel loops and within the right colon which could be seen in the setting of an enteritis. Correlate clinically.   3. 2.4 x 3 cm complex lesion of the left kidney. There could be a solid component in this lesion. Recommend nonemergent multiphase follow-up MRI of the kidneys for further evaluation.       ADD (attention deficit disorder):  The patient is on Ritalin, which she takes only on a p.r.n. basis at work.     Depression:  The patient is not on any medication for this.     Covid19 negative  - Vaccination record not in Epic. Patient confirms she is fully vaccinated and boosted. [facility].        Consultations This Hospital Stay   UROLOGY IP CONSULT  CARE MANAGEMENT / SOCIAL WORK IP CONSULT    Code Status   Full Code    Time Spent on this Encounter   IMaryse DO, personally saw the patient today and spent greater than 30 minutes discharging this patient.       Maryse Burton DO  [place] [address] ONCOLOGY  [facility] [address]  [facility] [address]  Phone:  [phone]  ______________________________________________________________________    Physical Exam   Vital Signs: Temp: 98.4  F (36.9  C) Temp src: Oral BP: 117/68 Pulse: 81   Resp: 16 SpO2: 97 % O2 Device: None (Room air)    Weight: 193 lbs 0 oz     Constitutional:  awake and alert, NAD  Neuropsyche:  Alert and oriented, answers questions appropriately.   Respiratory:      Breathing comfortably, good air exchange, no wheezes, no crackles.   Cardiovascular:  Regular rate and rhythm, no edema.  GI:  soft, TTP throughout lower abdomen without rebound or guarding  Skin/Integumen:  No acute rash    Primary Care Physician   Jovanny Zayas    Discharge Orders      Reason for your hospital stay    You presented for a kidney infection and required IV antibiotics. You were also found to have an increasing renal mass and need close follow up with urology on discharge.     Follow-up and recommended labs and tests     Follow up with primary care provider, Jovanny Zayas, within 7 days for hospital follow- up.  No follow up labs or test are needed.    Follow up with [place] [facility] to assess mass and need for biopsy. Number for appts  [phone]     Activity    Your activity upon discharge: activity as tolerated     Diet    Follow this diet upon discharge: Orders Placed This Encounter      Regular Diet Adult       Significant Results and Procedures   Most Recent 3 CBC's:Recent Labs   Lab Test 12/31/21  0738 12/30/21  1540 12/29/21  2245   WBC 9.1 10.5 17.1*   HGB 10.7* 10.8* 13.0   MCV 91 92 92   * 115* 147*     Most Recent 3 BMP's:Recent Labs   Lab Test 12/31/21  0738 12/30/21  1540 12/29/21  2245    138 137   POTASSIUM 3.6 4.0 3.9   CHLORIDE 111* 111* 109   CO2 20 21 21   BUN 12 22 17   CR 0.93 2.08* 1.21*   ANIONGAP 6 6 7   CADEN 7.6* 7.6* 8.3*   GLC 78 84 119*     Most Recent 2 LFT's:Recent Labs   Lab Test 12/30/21  1540 02/05/18  1225   AST 10 16   ALT 16 23   ALKPHOS 38* 46   BILITOTAL 0.4  0.5     Most Recent 6 Bacteria Isolates From Any Culture (See EPIC Reports for Culture Details):Recent Labs   Lab Test 01/14/15  1057 01/14/15  0811 01/14/15  0755   CULT >100,000 colonies/mL Escherichia coli* No growth No growth   ,   Results for orders placed or performed during the hospital encounter of 12/29/21   US Renal Complete    Narrative    EXAM: US RENAL COMPLETE  LOCATION: [facility]  DATE/TIME: 12/30/2021 5:44 PM    INDICATION: Flank pain. Evaluate for hydronephrosis.  COMPARISON: The CT from 3/27/2018.  TECHNIQUE: Routine Bilateral Renal and Bladder Ultrasound.    FINDINGS:    RIGHT KIDNEY: 10.6 x 5.9 x 4.9 cm. Normal parenchymal echogenicity without hydronephrosis.     LEFT KIDNEY: 11.0 x 5.1 x 5.4 cm. Normal parenchymal echogenicity without hydronephrosis. Within the lower pole of the left kidney is a hypoechoic solid lesion with internal vascular flow measuring approximately 2.4 x 2.1 x 2.3 cm. This is indeterminant.       BLADDER: Normal.      Impression    IMPRESSION:  1.  Approximately 2.4 cm solid mass within the inferior left kidney. Of note on the CT from 3/27/2018 a mass was biopsied in the left kidney.  2.  No additional renal or bladder abnormality.  3.  Incidental note is made of free fluid/ascites within the peritoneum.    NOTE: ABNORMAL REPORT    THE DICTATION ABOVE DESCRIBES AN ABNORMALITY FOR WHICH FOLLOW-UP IS NEEDED.    MR Renal wo & w Contrast    Narrative    EXAM: MR RENAL WITHOUT AND WITH CONTRAST  LOCATION: [facility]  DATE/TIME: 01/01/2022, 12:42 PM    INDICATION: Renal mass.  COMPARISON: MRI abdomen 03/02/2018. Ultrasound kidneys 12/30/2021.  TECHNIQUE: Routine MRI renal protocol including T1 in/out phase, diffusion, multiplane T2, and dynamic T1 with IV contrast.  CONTRAST: 9 mL Gadavist.    FINDINGS:    RIGHT KIDNEY: No hydronephrosis. No focal right renal lesion.    LEFT KIDNEY: There is an enlarging solid and  cystic-appearing nodular mass localizing to the posterolateral lower left kidney. It is approximately 2.8 x 2.2 cm, previously 1.5 x 1.1 cm, series 12 image 45. This appears to show areas of solid enhancement   medially that appears heterogeneous. There is a cystic component peripherally.    ADDITIONAL FINDINGS: No visible enlarged lymph nodes. Partially included imaging of the liver,, gallbladder, adrenals, and pancreas shows no acute abnormality. Small ascites. No evidence for bowel obstruction.      Impression    IMPRESSION:  1.  Interval enlargement of a complex solid and cystic nodular mass at the lower left kidney as compared to the prior MRI from j03/02/2018. There appear to be internal solid components showing a degree of enhancement. A neoplastic etiology is possible   and further workup is recommended.  2.  Small ascites.           Discharge Medications   Current Discharge Medication List      START taking these medications    Details   ondansetron (ZOFRAN-ODT) 4 MG ODT tab Take 1 tablet (4 mg) by mouth every 6 hours as needed for nausea or vomiting  Qty: 10 tablet, Refills: 0    Associated Diagnoses: Pyelonephritis      oxyCODONE (ROXICODONE) 5 MG tablet Take 1-2 tablets (5-10 mg) by mouth every 4 hours as needed for moderate to severe pain  Qty: 10 tablet, Refills: 0    Associated Diagnoses: Pyelonephritis      sulfamethoxazole-trimethoprim (BACTRIM DS) 800-160 MG tablet Take 1 tablet by mouth 2 times daily for 7 days  Qty: 14 tablet, Refills: 0    Associated Diagnoses: Pyelonephritis         CONTINUE these medications which have NOT CHANGED    Details   SUMAtriptan (IMITREX) 50 MG tablet Take 50 mg by mouth at onset of headache for migraine      B Complex-C-Folic Acid (B COMPLEX + C TR PO)       cetirizine (ZYRTEC) 10 MG tablet Take 10 mg by mouth daily      fish oil-omega-3 fatty acids 1000 MG capsule Take 1,000 mg by mouth      methylphenidate (RITALIN LA) 20 MG 24 hr capsule Take 20 mg by mouth  daily  Qty: 30 capsule, Refills: 0    Associated Diagnoses: Attention deficit hyperactivity disorder (ADHD), combined type      Prenatal Vit-Fe Fumarate-FA (PRENATAL VITAMIN PO)       vitamin (B COMPLEX-C) tablet          STOP taking these medications       ketorolac (TORADOL) 10 MG tablet Comments:   Reason for Stopping:             Allergies   Allergies   Allergen Reactions     Gluten Meal      Iodine

## 2022-01-02 NOTE — PLAN OF CARE
Discharge Note    Patient discharged to home via private vehicle  accompanied by no family/friend .  IV: Discontinued  Prescriptions filled and given to patient/family.   Belongings reviewed and sent with patient.   Home medications returned to patient: NA  Equipment sent with: N/A.   patient verbalizes understanding of discharge instructions. AVS given to patient.

## 2022-01-03 ENCOUNTER — PATIENT OUTREACH (OUTPATIENT)
Dept: CARE COORDINATION | Facility: CLINIC | Age: 37
End: 2022-01-03
Payer: COMMERCIAL

## 2022-01-03 DIAGNOSIS — Z71.89 OTHER SPECIFIED COUNSELING: ICD-10-CM

## 2022-01-03 NOTE — PROGRESS NOTES
Clinic Care Coordination Contact  [facility]/Voicemail       Clinical Data: Care Coordinator Outreach  Outreach attempted x 1.  Left message on patient's voicemail with call back information and requested return call.  Plan: Care Coordinator will try to reach patient again in 1-2 business days.      Dariana Mcneill  Care Transitions Assistant  General acute hospital

## 2022-01-04 ENCOUNTER — TELEPHONE (OUTPATIENT)
Dept: UROLOGY | Facility: CLINIC | Age: 37
End: 2022-01-04
Payer: COMMERCIAL

## 2022-01-04 LAB
BACTERIA BLD CULT: NO GROWTH
BACTERIA BLD CULT: NO GROWTH

## 2022-01-04 NOTE — TELEPHONE ENCOUNTER
[facility] Call Center    Phone Message    May a detailed message be left on voicemail: yes     Reason for Call: Appointment Intake    Referring Provider Name: ER discharge 1/02/2022  Diagnosis and/or Symptoms: New kidney mass    Action Taken: Message routed to:  Other: UA Uro    Travel Screening: Not Applicable

## 2022-01-04 NOTE — PROGRESS NOTES
Clinic Care Coordination Contact  [facility]: Post-Discharge Note  SITUATION                                                      Admission:    Admission Date: 12/29/21   Reason for Admission: Acute right-sided pyelonephritis  Discharge:   Discharge Date: 01/02/22  Discharge Diagnosis: Acute right-sided pyelonephritis    BACKGROUND                                                      36, who was recently tx for a urinary tract infection 12/29 at [facility] who presented to the ER for worsening pain, abnormal UA, GREG and concern for pyelonephritis. She was started on IV antibiotics. Cultures on admission obtained while on antibiotics and remained negative during admission. Patient developed urinary retention while in the hospital requiring gonzalez placement. Gonzalez removed on HD 3 and patient urinated without difficulty. She will discharge with antibiotics to complete 10 days of treatment.  She also underwent Renal MRI for incidentally noted renal mass which increased in size from previous imaging. It is reccommended she have follow up with urology on discharge to discuss mass and possible biopsy.    ASSESSMENT      Enrollment  Primary Care Care Coordination Status: Declined    Discharge Assessment  How are you doing now that you are home?: Better than I was,  How are your symptoms? (Red Flag symptoms escalate to triage hotline per guidelines): Improved  Do you feel your condition is stable enough to be safe at home until your provider visit?: Yes  Does the patient have their discharge instructions? : Yes  Does the patient have questions regarding their discharge instructions? : No  Were you started on any new medications or were there changes to any of your previous medications? : Yes  Does the patient have all of their medications?: Yes  Do you have questions regarding any of your medications? : No  Do you have all of your needed medical supplies or equipment (DME)?  (i.e. oxygen tank, CPAP, cane, etc.):  Yes  Discharge follow-up appointment scheduled within 14 calendar days? : No  Is patient agreeable to assistance with scheduling? : No (wants to call herself)                PLAN                                                      Outpatient Plan:  Follow up with primary care provider, Jovanny Zayas, within 7   days for hospital follow- up.  No follow up labs or test are needed.     Follow up with [place] [facility] to assess mass and need for biopsy. Number   for appts  [phone]       No future appointments.      For any urgent concerns, please contact our 24 hour nurse triage line: [phone] ([phone])         Sabina Mcneill MA

## 2022-01-10 ENCOUNTER — VIRTUAL VISIT (OUTPATIENT)
Dept: UROLOGY | Facility: CLINIC | Age: 37
End: 2022-01-10
Payer: COMMERCIAL

## 2022-01-10 VITALS — BODY MASS INDEX: 30.32 KG/M2 | WEIGHT: 182 LBS | HEIGHT: 65 IN

## 2022-01-10 DIAGNOSIS — N28.89 RENAL MASS: Primary | ICD-10-CM

## 2022-01-10 PROCEDURE — 99214 OFFICE O/P EST MOD 30 MIN: CPT | Mod: 95 | Performed by: UROLOGY

## 2022-01-10 RX ORDER — CEFAZOLIN SODIUM 2 G/50ML
2 SOLUTION INTRAVENOUS SEE ADMIN INSTRUCTIONS
Status: CANCELLED | OUTPATIENT
Start: 2022-01-10

## 2022-01-10 RX ORDER — CEFAZOLIN SODIUM 2 G/50ML
2 SOLUTION INTRAVENOUS
Status: CANCELLED | OUTPATIENT
Start: 2022-01-10

## 2022-01-10 ASSESSMENT — PAIN SCALES - GENERAL: PAINLEVEL: MILD PAIN (3)

## 2022-01-10 ASSESSMENT — MIFFLIN-ST. JEOR: SCORE: 1511.43

## 2022-01-10 NOTE — LETTER
1/10/2022       RE: Nel Durán  [address]  [facility] [address]     Dear Colleague,    Thank you for referring your patient, Nel Durán, to the [facility] [place] at [facility]. Please see a copy of my visit note below.    *SEND LINK TO CELL PHONE*    Nel is a 36 year old who is being evaluated via a billable video visit.      How would you like to obtain your AVS? MyChart  If the video visit is dropped, the invitation should be resent by: Text to cell phone: [phone]  Will anyone else be joining your video visit? No         Office Visit Note  [facility]  [phone]    Ottoniel 10, 2022    [unfilled]    1985    UROLOGIC DIAGNOSES:    Left renal mass  Prior cystotomy during  section    CURRENT INTERVENTIONS:    Nondiagnostic biopsy 2018  Prior cystotomy repair    History:    I last saw Nel in the clinic in 2018 after she had a nondiagnostic biopsy of a left renal mass.  I recommended follow-up surveillance 6 months later.  I have not seen her back in the clinic since that time.    She was recently admitted to the hospital with a urinary tract infection.  A renal ultrasound on admission showed that the mass had likely grown in size so an MRI was performed.  The MRI showed that the mass now measuring 2.8 x 2.2 cm and had both cystic and solid components.  The solid components did show enhancement.      Imaging: I reviewed her MRI images.  The mass has grown in size.  It is located laterally off the lower pole of the left kidney.  It does show areas of enhancement.    Labs:      MEDICATIONS:    Current Outpatient Medications:      B Complex-C-Folic Acid (B COMPLEX + C TR PO), , Disp: , Rfl:      cetirizine (ZYRTEC) 10 MG tablet, Take 10 mg by mouth daily, Disp: , Rfl:      fish oil-omega-3 fatty acids 1000 MG capsule, Take 1,000 mg by mouth, Disp: , Rfl:      methylphenidate  (RITALIN LA) 20 MG 24 hr capsule, Take 20 mg by mouth daily, Disp: 30 capsule, Rfl: 0     Prenatal Vit-Fe Fumarate-FA (PRENATAL VITAMIN PO), , Disp: , Rfl:      SUMAtriptan (IMITREX) 50 MG tablet, Take 50 mg by mouth at onset of headache for migraine, Disp: , Rfl:      vitamin (B COMPLEX-C) tablet, , Disp: , Rfl:      ondansetron (ZOFRAN-ODT) 4 MG ODT tab, Take 1 tablet (4 mg) by mouth every 6 hours as needed for nausea or vomiting (Patient not taking: Reported on 1/10/2022), Disp: 10 tablet, Rfl: 0     oxyCODONE (ROXICODONE) 5 MG tablet, Take 1-2 tablets (5-10 mg) by mouth every 4 hours as needed for moderate to severe pain (Patient not taking: Reported on 1/10/2022), Disp: 10 tablet, Rfl: 0    ALLERGIES:     Allergies   Allergen Reactions     Gluten Meal      Iodine        REVIEW OF SYSTEMS:   Skin: No rash, pruritis, or skin pigmentation  Eyes: No changes in vision  Ears/Nose/Throat: No changes in hearing, no nosebleeds  Respiratory: No shortness of breath, dyspnea on exertion, cough, or hemoptysis  Cardiovascular: No chest pain or palpitations  Gastrointestinal: No diarrhea or constipation. No abdominal pain. No hematochezia  Genitourinary: see HPI  Musculoskeletal: No pain or swelling of joints, normal range of motion  Neurologic: No weakness or tremors  Psychiatric: No recent changes in memory or mood  Hematologic/Lymphatic/Immunologic: No easy bruising or enlarged lymph nodes  Endocrine: No weight gain or loss    SURGICAL HISTORY:    Past Surgical History:   Procedure Laterality Date     BIOPSY       BLADDER SURGERY       C/SECTION, LOW TRANSVERSE        x2     CERVICECTOMY (TRACHELECTOMY) N/A 1/9/2015    not done     COLONOSCOPY  6/12/2014    Procedure: COMBINED COLONOSCOPY, SINGLE BIOPSY/POLYPECTOMY BY BIOPSY;  Surgeon: Elizabeth Shepard MD;  Location:  GI     CYSTOSCOPY  4/5/2013    Procedure: CYSTOSCOPY;;  Surgeon: Mariel Smith MD;  Location: [facility] OR     LABIALPLASTY  4/4/2013     Procedure: LABIALPLASTY;;  Surgeon: Leticia Staley MD;  Location: [facility] OR     LAPAROSCOPIC HYSTERECTOMY SUPRACERVICAL  4/4/2013    simple hyperplasiaProcedure: LAPAROSCOPIC HYSTERECTOMY SUPRACERVICAL;  Laparoscopic Supracervical Hysterectomy, left labialplasty;  Surgeon: Leticia Staley MD;  Location: [facility] OR     LAPAROSCOPIC LYSIS ADHESIONS N/A 1/9/2015    Procedure: LAPAROSCOPIC LYSIS ADHESIONS;  Surgeon: Sue Johnston MD;  Location: [facility] OR     LAPAROSCOPIC TUBAL LIGATION  3/7/2013    Procedure: LAPAROSCOPIC TUBAL LIGATION;  Bilateral Laparoscopic Tubal Ligation With Intrauterine Device Removal ;  Surgeon: Jaime Patrick MD;  Location: [facility] OR     LAPAROSCOPY DIAGNOSTIC (GYN)  4/5/2013    Procedure: LAPAROSCOPY DIAGNOSTIC (GYN);;  Surgeon: Mariel Smith MD;  Location: [facility] OR     LAPAROSCOPY OPERATIVE ADULT N/A 1/9/2015    Procedure: LAPAROSCOPY OPERATIVE ADULT;  Surgeon: Sue Johnston MD;  Location: [facility] OR     LAPAROTOMY EXPLORATORY  4/5/2013    Procedure: LAPAROTOMY EXPLORATORY;  ligasure of pedicles and cervical stump;  Surgeon: Mariel Smith MD;  Location: [facility] OR         PHYSICAL EXAM:    General: Alert and oriented to time, place, and self. In NAD   HEENT: Head AT/NC, EOMI, CN Grossly intact   Lungs: no respiratory distress, or pursed lip breathing   Heart: No obvious jugular venous distension present   Musculoskeltal: Normal movements. Normal appearing musculature  Skin: no suspicious lesions or rashes   Neuro: Alert, oriented, speech and mentation normal; moving all 4 extremities equally.   Psych: affect and mood normal        Urinalysis:  UA RESULTS:  Recent Labs   Lab Test 12/30/21  2228 12/30/21  1115 03/22/18  0849   COLOR Orange*   < > Yellow   APPEARANCE Cloudy*   < > Clear   URINEGLC 50 *   < > Negative   URINEBILI Negative   < > Negative   URINEKETONE 20 *   < > Trace*   SG 1.023   < > 1.025   UBLD Large*   < > Negative   URINEPH 6.5   < > 6.0   PROTEIN 300 *   < >  Negative   UROBILINOGEN  --   --  1.0   NITRITE Negative   < > Negative   LEUKEST Large*   < > Negative   RBCU >182*   < >  --    WBCU >182*   < >  --     < > = values in this interval not displayed.         IMPRESSION:    Left renal mass    PLAN:    We discussed her left renal mass.  The mass has grown in size over the past 3 years.  We discussed options of observation versus renal mass biopsy versus treatment.  I would not recommend observation since the mass has grown in the last 3 years and she is young and healthy.  I recommended she either consider repeat renal mass biopsy or consider partial nephrectomy.  She would like to undergo partial nephrectomy.    We discussed robotic assisted laparoscopic partial nephrectomy in detail today along with its risks and expected recovery.  I do feel that the mass is quite amenable to partial nephrectomy.  She has had 3 prior  sections as well as laparoscopy procedures.  We did discuss the risks that she may require an open procedure.  We also discussed the risks that she may require a complete nephrectomy.  We also discussed the general risks of surgery and anesthesia.  She wishes to proceed.  We will get her scheduled in the operating room and she will be admitted to the hospital postoperatively.      Matthew Aranda M.D.          Video Start Time: 4:10PM  Video-Visit Details    Type of service:  Video Visit    Video End Time:4:21 PM    Originating Location (pt. Location): Home    Distant Location (provider location):  [facility] [place]     Platform used for Video Visit: [email or web address]

## 2022-01-10 NOTE — PROGRESS NOTES
*SEND LINK TO CELL PHONE*    Nel is a 36 year old who is being evaluated via a billable video visit.      How would you like to obtain your AVS? MyChart  If the video visit is dropped, the invitation should be resent by: Text to cell phone: [phone]  Will anyone else be joining your video visit? No         Office Visit Note  [facility]  [phone]    Ottoniel 10, 2022    [unfilled]    1985    UROLOGIC DIAGNOSES:    Left renal mass  Prior cystotomy during  section    CURRENT INTERVENTIONS:    Nondiagnostic biopsy 2018  Prior cystotomy repair    History:    I last saw Nel in the clinic in 2018 after she had a nondiagnostic biopsy of a left renal mass.  I recommended follow-up surveillance 6 months later.  I have not seen her back in the clinic since that time.    She was recently admitted to the hospital with a urinary tract infection.  A renal ultrasound on admission showed that the mass had likely grown in size so an MRI was performed.  The MRI showed that the mass now measuring 2.8 x 2.2 cm and had both cystic and solid components.  The solid components did show enhancement.      Imaging: I reviewed her MRI images.  The mass has grown in size.  It is located laterally off the lower pole of the left kidney.  It does show areas of enhancement.    Labs:      MEDICATIONS:    Current Outpatient Medications:      B Complex-C-Folic Acid (B COMPLEX + C TR PO), , Disp: , Rfl:      cetirizine (ZYRTEC) 10 MG tablet, Take 10 mg by mouth daily, Disp: , Rfl:      fish oil-omega-3 fatty acids 1000 MG capsule, Take 1,000 mg by mouth, Disp: , Rfl:      methylphenidate (RITALIN LA) 20 MG 24 hr capsule, Take 20 mg by mouth daily, Disp: 30 capsule, Rfl: 0     Prenatal Vit-Fe Fumarate-FA (PRENATAL VITAMIN PO), , Disp: , Rfl:      SUMAtriptan (IMITREX) 50 MG tablet, Take 50 mg by mouth at onset of headache for migraine, Disp: , Rfl:      vitamin (B COMPLEX-C) tablet, , Disp: , Rfl:      ondansetron  (ZOFRAN-ODT) 4 MG ODT tab, Take 1 tablet (4 mg) by mouth every 6 hours as needed for nausea or vomiting (Patient not taking: Reported on 1/10/2022), Disp: 10 tablet, Rfl: 0     oxyCODONE (ROXICODONE) 5 MG tablet, Take 1-2 tablets (5-10 mg) by mouth every 4 hours as needed for moderate to severe pain (Patient not taking: Reported on 1/10/2022), Disp: 10 tablet, Rfl: 0    ALLERGIES:     Allergies   Allergen Reactions     Gluten Meal      Iodine        REVIEW OF SYSTEMS:   Skin: No rash, pruritis, or skin pigmentation  Eyes: No changes in vision  Ears/Nose/Throat: No changes in hearing, no nosebleeds  Respiratory: No shortness of breath, dyspnea on exertion, cough, or hemoptysis  Cardiovascular: No chest pain or palpitations  Gastrointestinal: No diarrhea or constipation. No abdominal pain. No hematochezia  Genitourinary: see HPI  Musculoskeletal: No pain or swelling of joints, normal range of motion  Neurologic: No weakness or tremors  Psychiatric: No recent changes in memory or mood  Hematologic/Lymphatic/Immunologic: No easy bruising or enlarged lymph nodes  Endocrine: No weight gain or loss    SURGICAL HISTORY:    Past Surgical History:   Procedure Laterality Date     BIOPSY       BLADDER SURGERY       C/SECTION, LOW TRANSVERSE        x2     CERVICECTOMY (TRACHELECTOMY) N/A 1/9/2015    not done     COLONOSCOPY  6/12/2014    Procedure: COMBINED COLONOSCOPY, SINGLE BIOPSY/POLYPECTOMY BY BIOPSY;  Surgeon: Elizabeth Shepard MD;  Location:  GI     CYSTOSCOPY  4/5/2013    Procedure: CYSTOSCOPY;;  Surgeon: Mariel Smith MD;  [facility]     LABIALPLASTY  4/4/2013    Procedure: LABIALPLASTY;;  Surgeon: Leticia Staley MD;  [facility]     LAPAROSCOPIC HYSTERECTOMY SUPRACERVICAL  4/4/2013    simple hyperplasiaProcedure: LAPAROSCOPIC HYSTERECTOMY SUPRACERVICAL;  Laparoscopic Supracervical Hysterectomy, left labialplasty;  Surgeon: Leticia Staley MD;  [facility]      LAPAROSCOPIC LYSIS ADHESIONS N/A 1/9/2015    Procedure: LAPAROSCOPIC LYSIS ADHESIONS;  Surgeon: Sue Johnston MD;  [facility]     LAPAROSCOPIC TUBAL LIGATION  3/7/2013    Procedure: LAPAROSCOPIC TUBAL LIGATION;  Bilateral Laparoscopic Tubal Ligation With Intrauterine Device Removal ;  Surgeon: Jaime Patrick MD;  [facility]     LAPAROSCOPY DIAGNOSTIC (GYN)  4/5/2013    Procedure: LAPAROSCOPY DIAGNOSTIC (GYN);;  Surgeon: Mariel Smith MD;  [facility]     LAPAROSCOPY OPERATIVE ADULT N/A 1/9/2015    Procedure: LAPAROSCOPY OPERATIVE ADULT;  Surgeon: Sue Johnston MD;  [facility]     LAPAROTOMY EXPLORATORY  4/5/2013    Procedure: LAPAROTOMY EXPLORATORY;  ligasure of pedicles and cervical stump;  Surgeon: Mariel Smith MD;  [facility]         PHYSICAL EXAM:    General: Alert and oriented to time, place, and self. In NAD   HEENT: Head AT/NC, EOMI, CN Grossly intact   Lungs: no respiratory distress, or pursed lip breathing   Heart: No obvious jugular venous distension present   Musculoskeltal: Normal movements. Normal appearing musculature  Skin: no suspicious lesions or rashes   Neuro: Alert, oriented, speech and mentation normal; moving all 4 extremities equally.   Psych: affect and mood normal        Urinalysis:  UA RESULTS:  Recent Labs   Lab Test 12/30/21  2228 12/30/21  1115 03/22/18  0849   COLOR Orange*   < > Yellow   APPEARANCE Cloudy*   < > Clear   URINEGLC 50 *   < > Negative   URINEBILI Negative   < > Negative   URINEKETONE 20 *   < > Trace*   SG 1.023   < > 1.025   UBLD Large*   < > Negative   URINEPH 6.5   < > 6.0   PROTEIN 300 *   < > Negative   UROBILINOGEN  --   --  1.0   NITRITE Negative   < > Negative   LEUKEST Large*   < > Negative   RBCU >182*   < >  --    WBCU >182*   < >  --     < > = values in this interval not displayed.         IMPRESSION:    Left renal mass    PLAN:    We discussed her left renal mass.  The mass has grown in size over the  past 3 years.  We discussed options of observation versus renal mass biopsy versus treatment.  I would not recommend observation since the mass has grown in the last 3 years and she is young and healthy.  I recommended she either consider repeat renal mass biopsy or consider partial nephrectomy.  She would like to undergo partial nephrectomy.    We discussed robotic assisted laparoscopic partial nephrectomy in detail today along with its risks and expected recovery.  I do feel that the mass is quite amenable to partial nephrectomy.  She has had 3 prior  sections as well as laparoscopy procedures.  We did discuss the risks that she may require an open procedure.  We also discussed the risks that she may require a complete nephrectomy.  We also discussed the general risks of surgery and anesthesia.  She wishes to proceed.  We will get her scheduled in the operating room and she will be admitted to the hospital postoperatively.      Matthew Aranda M.D.          Video Start Time: 4:10PM  Video-Visit Details    Type of service:  Video Visit    Video End Time:4:21 PM    Originating Location (pt. Location): Home    Distant Location (provider location):  [facility] [place]     Platform used for Video Visit: [email or web address]

## 2022-01-12 ENCOUNTER — MYC MEDICAL ADVICE (OUTPATIENT)
Dept: FAMILY MEDICINE | Facility: CLINIC | Age: 37
End: 2022-01-12
Payer: COMMERCIAL

## 2022-01-13 NOTE — TELEPHONE ENCOUNTER
JS,    Please see Funderbeamt message.  On medication history list looks like you signed Rx for 1/6/22 and 2/6/22.    Note:   12/29/21 2238 Discontinue Carlene Gimenez RN     Looks like 12/29/21 she was seen in ED and admitted to hospital.    Rachelle Ceja RN

## 2022-01-14 NOTE — TELEPHONE ENCOUNTER
Called [facility] pharmacy.  The due have Rx for both 1/6/22 and 2/6/22 in patient profile which have not been refill yet.    Patient informed via Access [employer] to contact pharmacy for refill.    Rachelle Ceja RN

## 2022-01-15 ENCOUNTER — HEALTH MAINTENANCE LETTER (OUTPATIENT)
Age: 37
End: 2022-01-15

## 2022-01-24 ENCOUNTER — APPOINTMENT (OUTPATIENT)
Dept: CT IMAGING | Facility: CLINIC | Age: 37
End: 2022-01-24
Attending: EMERGENCY MEDICINE
Payer: COMMERCIAL

## 2022-01-24 ENCOUNTER — APPOINTMENT (OUTPATIENT)
Dept: ULTRASOUND IMAGING | Facility: CLINIC | Age: 37
End: 2022-01-24
Attending: EMERGENCY MEDICINE
Payer: COMMERCIAL

## 2022-01-24 ENCOUNTER — HOSPITAL ENCOUNTER (EMERGENCY)
Facility: CLINIC | Age: 37
Discharge: HOME | End: 2022-01-24
Attending: EMERGENCY MEDICINE | Admitting: EMERGENCY MEDICINE
Payer: COMMERCIAL

## 2022-01-24 VITALS
WEIGHT: 180 LBS | HEART RATE: 65 BPM | TEMPERATURE: 98.4 F | HEIGHT: 65 IN | DIASTOLIC BLOOD PRESSURE: 78 MMHG | SYSTOLIC BLOOD PRESSURE: 131 MMHG | RESPIRATION RATE: 18 BRPM | OXYGEN SATURATION: 99 % | BODY MASS INDEX: 29.99 KG/M2

## 2022-01-24 DIAGNOSIS — N83.8 MASS OF RIGHT OVARY: ICD-10-CM

## 2022-01-24 DIAGNOSIS — S30.1XXA HEMATOMA OF RECTUS SHEATH, INITIAL ENCOUNTER: ICD-10-CM

## 2022-01-24 DIAGNOSIS — N83.201 HEMORRHAGIC CYST OF RIGHT OVARY: ICD-10-CM

## 2022-01-24 LAB
ALBUMIN SERPL-MCNC: 3.7 G/DL (ref 3.4–5)
ALBUMIN UR-MCNC: NEGATIVE MG/DL
ALP SERPL-CCNC: 51 U/L (ref 40–150)
ALT SERPL W P-5'-P-CCNC: 27 U/L (ref 0–50)
ANION GAP SERPL CALCULATED.3IONS-SCNC: 4 MMOL/L (ref 3–14)
APPEARANCE UR: CLEAR
AST SERPL W P-5'-P-CCNC: 20 U/L (ref 0–45)
BACTERIA #/AREA URNS HPF: ABNORMAL /HPF
BASOPHILS # BLD AUTO: 0.1 10E3/UL (ref 0–0.2)
BASOPHILS NFR BLD AUTO: 1 %
BILIRUB SERPL-MCNC: 0.6 MG/DL (ref 0.2–1.3)
BILIRUB UR QL STRIP: NEGATIVE
BUN SERPL-MCNC: 10 MG/DL (ref 7–30)
CALCIUM SERPL-MCNC: 8.9 MG/DL (ref 8.5–10.1)
CHLORIDE BLD-SCNC: 105 MMOL/L (ref 94–109)
CO2 SERPL-SCNC: 29 MMOL/L (ref 20–32)
COLOR UR AUTO: ABNORMAL
CREAT SERPL-MCNC: 0.66 MG/DL (ref 0.52–1.04)
EOSINOPHIL # BLD AUTO: 0.3 10E3/UL (ref 0–0.7)
EOSINOPHIL NFR BLD AUTO: 4 %
ERYTHROCYTE [DISTWIDTH] IN BLOOD BY AUTOMATED COUNT: 12.6 % (ref 10–15)
GFR SERPL CREATININE-BSD FRML MDRD: >90 ML/MIN/1.73M2
GLUCOSE BLD-MCNC: 86 MG/DL (ref 70–99)
GLUCOSE UR STRIP-MCNC: NEGATIVE MG/DL
HCG UR QL: NEGATIVE
HCT VFR BLD AUTO: 41.9 % (ref 35–47)
HGB BLD-MCNC: 13.1 G/DL (ref 11.7–15.7)
HGB UR QL STRIP: NEGATIVE
IMM GRANULOCYTES # BLD: 0 10E3/UL
IMM GRANULOCYTES NFR BLD: 0 %
KETONES UR STRIP-MCNC: NEGATIVE MG/DL
LEUKOCYTE ESTERASE UR QL STRIP: NEGATIVE
LYMPHOCYTES # BLD AUTO: 2.2 10E3/UL (ref 0.8–5.3)
LYMPHOCYTES NFR BLD AUTO: 28 %
MCH RBC QN AUTO: 29 PG (ref 26.5–33)
MCHC RBC AUTO-ENTMCNC: 31.3 G/DL (ref 31.5–36.5)
MCV RBC AUTO: 93 FL (ref 78–100)
MONOCYTES # BLD AUTO: 0.5 10E3/UL (ref 0–1.3)
MONOCYTES NFR BLD AUTO: 7 %
NEUTROPHILS # BLD AUTO: 4.7 10E3/UL (ref 1.6–8.3)
NEUTROPHILS NFR BLD AUTO: 60 %
NITRATE UR QL: NEGATIVE
NRBC # BLD AUTO: 0 10E3/UL
NRBC BLD AUTO-RTO: 0 /100
PH UR STRIP: 6 [PH] (ref 5–7)
PLATELET # BLD AUTO: 190 10E3/UL (ref 150–450)
POTASSIUM BLD-SCNC: 3.8 MMOL/L (ref 3.4–5.3)
PROT SERPL-MCNC: 7.3 G/DL (ref 6.8–8.8)
RBC # BLD AUTO: 4.51 10E6/UL (ref 3.8–5.2)
RBC URINE: <1 /HPF
SODIUM SERPL-SCNC: 138 MMOL/L (ref 133–144)
SP GR UR STRIP: 1 (ref 1–1.03)
SQUAMOUS EPITHELIAL: 7 /HPF
UROBILINOGEN UR STRIP-MCNC: NORMAL MG/DL
WBC # BLD AUTO: 7.8 10E3/UL (ref 4–11)
WBC URINE: <1 /HPF

## 2022-01-24 PROCEDURE — 258N000003 HC RX IP 258 OP 636: Performed by: EMERGENCY MEDICINE

## 2022-01-24 PROCEDURE — 81025 URINE PREGNANCY TEST: CPT | Performed by: EMERGENCY MEDICINE

## 2022-01-24 PROCEDURE — 96375 TX/PRO/DX INJ NEW DRUG ADDON: CPT

## 2022-01-24 PROCEDURE — 81001 URINALYSIS AUTO W/SCOPE: CPT | Performed by: EMERGENCY MEDICINE

## 2022-01-24 PROCEDURE — 96366 THER/PROPH/DIAG IV INF ADDON: CPT

## 2022-01-24 PROCEDURE — 74177 CT ABD & PELVIS W/CONTRAST: CPT

## 2022-01-24 PROCEDURE — 96365 THER/PROPH/DIAG IV INF INIT: CPT | Mod: 59

## 2022-01-24 PROCEDURE — 80053 COMPREHEN METABOLIC PANEL: CPT | Performed by: EMERGENCY MEDICINE

## 2022-01-24 PROCEDURE — 250N000011 HC RX IP 250 OP 636: Performed by: EMERGENCY MEDICINE

## 2022-01-24 PROCEDURE — 99285 EMERGENCY DEPT VISIT HI MDM: CPT | Mod: 25

## 2022-01-24 PROCEDURE — 36415 COLL VENOUS BLD VENIPUNCTURE: CPT | Performed by: EMERGENCY MEDICINE

## 2022-01-24 PROCEDURE — 85004 AUTOMATED DIFF WBC COUNT: CPT | Performed by: EMERGENCY MEDICINE

## 2022-01-24 PROCEDURE — 76830 TRANSVAGINAL US NON-OB: CPT

## 2022-01-24 PROCEDURE — 250N000009 HC RX 250: Performed by: EMERGENCY MEDICINE

## 2022-01-24 RX ORDER — HYDROMORPHONE HYDROCHLORIDE 1 MG/ML
0.5 INJECTION, SOLUTION INTRAMUSCULAR; INTRAVENOUS; SUBCUTANEOUS ONCE
Status: COMPLETED | OUTPATIENT
Start: 2022-01-24 | End: 2022-01-24

## 2022-01-24 RX ORDER — IOPAMIDOL 755 MG/ML
91 INJECTION, SOLUTION INTRAVASCULAR ONCE
Status: COMPLETED | OUTPATIENT
Start: 2022-01-24 | End: 2022-01-24

## 2022-01-24 RX ORDER — KETOROLAC TROMETHAMINE 15 MG/ML
15 INJECTION, SOLUTION INTRAMUSCULAR; INTRAVENOUS ONCE
Status: COMPLETED | OUTPATIENT
Start: 2022-01-24 | End: 2022-01-24

## 2022-01-24 RX ORDER — CEFTRIAXONE 2 G/1
2 INJECTION, POWDER, FOR SOLUTION INTRAMUSCULAR; INTRAVENOUS ONCE
Status: COMPLETED | OUTPATIENT
Start: 2022-01-24 | End: 2022-01-24

## 2022-01-24 RX ADMIN — KETOROLAC TROMETHAMINE 15 MG: 15 INJECTION, SOLUTION INTRAMUSCULAR; INTRAVENOUS at 12:50

## 2022-01-24 RX ADMIN — SODIUM CHLORIDE 65 ML: 900 INJECTION INTRAVENOUS at 13:15

## 2022-01-24 RX ADMIN — IOPAMIDOL 91 ML: 755 INJECTION, SOLUTION INTRAVENOUS at 13:15

## 2022-01-24 RX ADMIN — CEFTRIAXONE SODIUM 2 G: 2 INJECTION, POWDER, FOR SOLUTION INTRAMUSCULAR; INTRAVENOUS at 12:31

## 2022-01-24 RX ADMIN — SODIUM CHLORIDE 1000 ML: 9 INJECTION, SOLUTION INTRAVENOUS at 12:32

## 2022-01-24 RX ADMIN — HYDROMORPHONE HYDROCHLORIDE 0.5 MG: 1 INJECTION, SOLUTION INTRAMUSCULAR; INTRAVENOUS; SUBCUTANEOUS at 14:29

## 2022-01-24 ASSESSMENT — ENCOUNTER SYMPTOMS
ABDOMINAL PAIN: 1
CHILLS: 0
CHEST TIGHTNESS: 0
DIARRHEA: 0
SHORTNESS OF BREATH: 0
COUGH: 0
FEVER: 0
BLOOD IN STOOL: 0
NAUSEA: 1

## 2022-01-24 ASSESSMENT — MIFFLIN-ST. JEOR: SCORE: 1502.35

## 2022-01-24 NOTE — ED TRIAGE NOTES
Recently had kidney infection. Reports over last few days she has had lower pelvic pain with nausea and urinary s/sx.

## 2022-01-24 NOTE — ED PROVIDER NOTES
Received patient in signout awaiting disposition following pelvic ultrasound from Dr. Giuseppe Allison who received report from Dr. Eaton.    Ultrasound Pelvis Complete                                                                   IMPRESSION:  1.  Right ovarian 3.6 cm hemorrhagic cyst and probable small right  hydrosalpinx. No right ovarian torsion. A follow-up ultrasound in 4-6  weeks should be considered to assess for stability.  2.  Normal left ovary.  3.  Small amount of free fluid in the right adnexa.  4.  Hypoechoic soft tissue thickening in the left rectus sheath  anterior to the bladder measures 4.4 x 4.1 x 1.9 cm. This could  represent a chronic rectus sheath hematoma, less likely a deposit  related to endometriosis or other neoplastic deposit. A follow-up CT  or ultrasound in 4-6 weeks is suggested to ensure stability.  As read by radiology    I discussed the findings with the patient.  She was aware of the soft tissue thickening above the left rectus sheath.  She is being followed by a surgeon with plan for a partial nephrectomy for mass as well.  She understands the need for follow-up in 4 to 6 weeks to ensure that its not growing in size.  Patient was also informed of the hemorrhagic cyst.  She appears comfortable at this time not requiring pain medications.  No evidence of torsion.  She understands bleeding and pain precautions for return and need for follow-up ultrasound in 4 to 6 weeks to ensure resolution.  Patient was comfortable with this plan and all questions and concerns addressed     Alma Delia Barbour MD  01/24/22 0381

## 2022-01-24 NOTE — ED PROVIDER NOTES
History   Chief Complaint:  Pelvic Pain    HPI   Nel Durán is a 36 year old female who presents with pain across her lower abdomen which is worse in the right lower quadrant compared to the left lower quadrant and radiates to the right flank. The pain has been present for the past 3 days and feels similar to when she had pyelonephritis 1 month ago. She states that she has had dysuria and frequency of urination but no urgency or urinary retention. She is able to empty her bladder. Her urine culture grew out E. coli which was resistant to fluoroquinolones and she was initially treated with ceftriaxone IV and then discharged on Bactrim. She states her symptoms had fully resolved. She denies fevers or chills, denies abnormal vaginal discharge or bleeding. She has had a partial hysterectomy in the past but still has her ovaries and cervix. She denies vomiting, diarrhea, chest pain, shortness of breath, blood in her urine. She denies blood in her stool. She denies any risk of sexually transmitted disease.      Review of Systems   Constitutional: Negative for chills and fever.   Respiratory: Negative for cough, chest tightness and shortness of breath.    Cardiovascular: Negative for chest pain and leg swelling.   Gastrointestinal: Positive for abdominal pain and nausea. Negative for blood in stool and diarrhea.   All other systems reviewed and are negative.        Allergies:  Gluten Meal  Iodine    Medications:    cetirizine (ZYRTEC) 10 MG tablet  methylphenidate (RITALIN LA) 20 MG 24 hr capsule  ondansetron (ZOFRAN-ODT) 4 MG ODT tab  oxyCODONE (ROXICODONE) 5 MG tablet  Prenatal Vit-Fe Fumarate-FA (PRENATAL VITAMIN PO)  SUMAtriptan (IMITREX) 50 MG tablet    Past Medical History:    ADD  Depression   H/o blood transfusion   Menarche   Pyelonephritis   Endometriosis  Dysthymia     Past Surgical History:    Past Surgical History:   Procedure Laterality Date     BIOPSY       BLADDER SURGERY       C/SECTION, LOW  TRANSVERSE        x2     CERVICECTOMY (TRACHELECTOMY) N/A 1/9/2015    not done     COLONOSCOPY  6/12/2014    Procedure: COMBINED COLONOSCOPY, SINGLE BIOPSY/POLYPECTOMY BY BIOPSY;  Surgeon: Elizabeth Shepard MD;  [facility]     CYSTOSCOPY  4/5/2013    Procedure: CYSTOSCOPY;;  Surgeon: Mariel Smith MD;  [facility]     LABIALPLASTY  4/4/2013    Procedure: LABIALPLASTY;;  Surgeon: Leticia Staley MD;  [facility]     LAPAROSCOPIC HYSTERECTOMY SUPRACERVICAL  4/4/2013    simple hyperplasiaProcedure: LAPAROSCOPIC HYSTERECTOMY SUPRACERVICAL;  Laparoscopic Supracervical Hysterectomy, left labialplasty;  Surgeon: Leticia Staley MD;  [facility]     LAPAROSCOPIC LYSIS ADHESIONS N/A 1/9/2015    Procedure: LAPAROSCOPIC LYSIS ADHESIONS;  Surgeon: Sue Johnston MD;  [facility]     LAPAROSCOPIC TUBAL LIGATION  3/7/2013    Procedure: LAPAROSCOPIC TUBAL LIGATION;  Bilateral Laparoscopic Tubal Ligation With Intrauterine Device Removal ;  Surgeon: Jaime Patrick MD;  [facility]     LAPAROSCOPY DIAGNOSTIC (GYN)  4/5/2013    Procedure: LAPAROSCOPY DIAGNOSTIC (GYN);;  Surgeon: Mariel Smith MD;  [facility]     LAPAROSCOPY OPERATIVE ADULT N/A 1/9/2015    Procedure: LAPAROSCOPY OPERATIVE ADULT;  Surgeon: Sue Johnston MD;  [facility]     LAPAROTOMY EXPLORATORY  4/5/2013    Procedure: LAPAROTOMY EXPLORATORY;  ligasure of pedicles and cervical stump;  Surgeon: Mariel Smith MD;  [facility]       Family History:    Family History   Problem Relation Age of Onset     Gastrointestinal Disease Mother         colon polyps     Hypertension Mother      Gastrointestinal Disease Father         colon polyps     Family History Negative Other      Cancer - colorectal Maternal Grandmother         thinks grandparents on both sides had colon cancer, unsure of details     Cancer - colorectal Paternal Grandmother      Substance Abuse Brother   "    Social History:  Presents to the ED: alone  Never Smoker      Physical Exam     Patient Vitals for the past 24 hrs:   BP Temp Pulse Resp SpO2 Height Weight   01/24/22 1124 -- 98.4  F (36.9  C) -- -- -- 1.651 m (5' 5\") 81.6 kg (180 lb)   01/24/22 1123 130/76 -- 72 20 99 % -- --       Physical Exam  Nursing note and vitals reviewed.  Constitutional:  Appears well-developed and well-nourished.   HENT:   Head:    Atraumatic.   Mouth/Throat:   Oropharynx is clear and moist. No oropharyngeal exudate.   Eyes:    Pupils are equal, round, and reactive to light.   Neck:    Normal range of motion. Neck supple.      No tracheal deviation present. No thyromegaly present.   Cardiovascular:  Normal rate, regular rhythm, no murmur   Pulmonary/Chest: Breath sounds are clear and equal without wheezes or crackles.  Abdominal:   Soft. Bowel sounds are normal. Exhibits no distension and      no mass. There is tenderness across the lower abdomen maximal in the right lower quadrant with guarding but no rebound.  Back:                          Mild right CVA tenderness.  Musculoskeletal:  Exhibits no edema.   Lymphadenopathy:  No cervical adenopathy.   Neurological:   Alert and oriented to person, place, and time.   Skin:    Skin is warm and dry. No rash noted. No pallor.       Emergency Department Course     Imaging:  CT Abdomen Pelvis w Contrast   Preliminary Result   IMPRESSION:    1.  Multiple cystic structures of the right pelvis that may be from   the right ovary. The right ovary itself appears enlarged, and there is   small free pelvic fluid. Associated prominent edema of the fat planes   of the pelvis and right abdomen. Correlate with any evidence of   ovarian torsion or other acute ovarian abnormality. Recommend pelvic   ultrasound for further assessment.   2.  The appendix appears normal.   3.  An indeterminate larger complex solid and cystic mass at the lower   left kidney compared to the older CT from 2/5/2018. This is " recently   stable in size compared to the renal MRI from 1/1/2022. This should be   considered renal neoplasm with further workup recommended.   4.  Supracervical hysterectomy.      US Pelvis Cmplt w Transvag & Doppler LmtPel Duplex Limited    (Results Pending)       Laboratory:  Labs Ordered and Resulted from Time of ED Arrival to Time of ED Departure   ROUTINE UA WITH MICROSCOPIC REFLEX TO CULTURE - Abnormal       Result Value    Color Urine Straw      Appearance Urine Clear      Glucose Urine Negative      Bilirubin Urine Negative      Ketones Urine Negative      Specific Gravity Urine 1.005      Blood Urine Negative      pH Urine 6.0      Protein Albumin Urine Negative      Urobilinogen Urine Normal      Nitrite Urine Negative      Leukocyte Esterase Urine Negative      Bacteria Urine Few (*)     RBC Urine <1      WBC Urine <1      Squamous Epithelials Urine 7 (*)    CBC WITH PLATELETS AND DIFFERENTIAL - Abnormal    WBC Count 7.8      RBC Count 4.51      Hemoglobin 13.1      Hematocrit 41.9      MCV 93      MCH 29.0      MCHC 31.3 (*)     RDW 12.6      Platelet Count 190      % Neutrophils 60      % Lymphocytes 28      % Monocytes 7      % Eosinophils 4      % Basophils 1      % Immature Granulocytes 0      NRBCs per 100 WBC 0      Absolute Neutrophils 4.7      Absolute Lymphocytes 2.2      Absolute Monocytes 0.5      Absolute Eosinophils 0.3      Absolute Basophils 0.1      Absolute Immature Granulocytes 0.0      Absolute NRBCs 0.0     HCG QUALITATIVE URINE - Normal    hCG Urine Qualitative Negative     COMPREHENSIVE METABOLIC PANEL - Normal    Sodium 138      Potassium 3.8      Chloride 105      Carbon Dioxide (CO2) 29      Anion Gap 4      Urea Nitrogen 10      Creatinine 0.66      Calcium 8.9      Glucose 86      Alkaline Phosphatase 51      AST 20      ALT 27      Protein Total 7.3      Albumin 3.7      Bilirubin Total 0.6      GFR Estimate >90       Emergency Department Course:    Reviewed:  I reviewed  nursing notes, vitals, past history and care everywhere    Assessments:  1320 I obtained history and examined the patient as noted above.    I rechecked the patient and explained findings.     Interventions:  1231 Rocephin, 2 g, IV   1232 NS, 1 L, IV  1250 Toradol, 15 mg, IV   1429 Dilaudid, 0.5 mg, IV      Disposition:  Care of the patient was transferred to my colleague Dr. Barbour pending imaging results.     Impression & Plan      Medical Decision Making:  Nel Durán is a 36 year old female with a history of pyelonephritis who arrives with similar pain to her previous pyelonephritis which is located across the lower abdomen maximal in the right lower quadrant and radiates to the right flank. I performed a urinalysis which did not show any sign of infection so therefore I perform CT imaging which shows a cystic mass in the right lower quadrant in the region of her right ovary which is concerning for ovarian torsion. There was no sign of appendicitis, pyelonephritis or ureteral calculus. At this point pelvic ultrasound was performed to check for ovarian torsion and evaluate the cystic lesion in the patient was signed out to Dr. Giuseppe Allison who will sign out to Dr. Barbour to follow-up on the pelvic imaging.    Diagnosis:    ICD-10-CM    1. Mass of right ovary  N83.8        Scribe Disclosure:  I, Madhu Rodriguez, am serving as a scribe at 1:24 PM on 1/24/2022 to document services personally performed by Adry Eaton MD based on my observations and the provider's statements to me.          Adry Eaton MD  01/24/22 1500       Adry Eaton MD  01/24/22 6478

## 2022-02-11 DIAGNOSIS — Z11.59 ENCOUNTER FOR SCREENING FOR OTHER VIRAL DISEASES: Primary | ICD-10-CM

## 2022-03-03 NOTE — H&P (VIEW-ONLY)
[facility]  [address], [address]  [facility] [address]  Phone: [phone]  Primary Provider: Jovanny Zayas        PREOPERATIVE EVALUATION:  Today's date: 3/4/2022    Nel Durán is a 37 year old female who presents for a preoperative evaluation.    Surgical Information:  Surgery/Procedure:  Left Nephrectomy, partial, robot-assisted, laparoscopic  Surgery Location: [facility]  Surgeon: Matthew Aranda MD  Surgery Date: 3/8/2022  Time of Surgery: 12pm  Where patient plans to recover: At home with family  Fax number for surgical facility: Note does not need to be faxed, will be available electronically in Epic.    Type of Anesthesia Anticipated: General    Assessment & Plan     The proposed surgical procedure is considered INTERMEDIATE risk.    Preop general physical exam     - Comprehensive metabolic panel (BMP + Alb, Alk Phos, ALT, AST, Total. Bili, TP); Future  - CBC with platelets; Future  - Comprehensive metabolic panel (BMP + Alb, Alk Phos, ALT, AST, Total. Bili, TP)  - CBC with platelets    Left kidney mass     - Comprehensive metabolic panel (BMP + Alb, Alk Phos, ALT, AST, Total. Bili, TP); Future  - CBC with platelets; Future  - Comprehensive metabolic panel (BMP + Alb, Alk Phos, ALT, AST, Total. Bili, TP)  - CBC with platelets    Encounter for screening for other viral diseases     - Asymptomatic COVID-19 Virus (Coronavirus) by PCR Nose         Risks and Recommendations:  The patient has the following additional risks and recommendations for perioperative complications:   - No identified additional risk factors other than previously addressed    Medication Instructions:  pt will hold all medications -   Aware to stop any NSAIDs and the omega fish oil for one week prior     RECOMMENDATION:  APPROVAL GIVEN to proceed with proposed procedure, without further diagnostic evaluation.                      Answers for HPI/ROS submitted by the  patient on 3/4/2022  If you checked off any problems, how difficult have these problems made it for you to do your work, take care of things at home, or get along with other people?: Somewhat difficult  PHQ9 TOTAL SCORE: 5        Subjective     HPI related to upcoming procedure: enlarging kidney mass       Preop Questions 3/4/2022   1. Have you ever had a heart attack or stroke? No   2. Have you ever had surgery on your heart or blood vessels, such as a stent placement, a coronary artery bypass, or surgery on an artery in your head, neck, heart, or legs? No   3. Do you have chest pain with activity? No   4. Do you have a history of  heart failure? No   5. Do you currently have a cold, bronchitis or symptoms of other infection? No   6. Do you have a cough, shortness of breath, or wheezing? No   7. Do you or anyone in your family have previous history of blood clots? No   8. Do you or does anyone in your family have a serious bleeding problem such as prolonged bleeding following surgeries or cuts? No   9. Have you ever had problems with anemia or been told to take iron pills? YES - due to complication from hysterectomy   10. Have you had any abnormal blood loss such as black, tarry or bloody stools, or abnormal vaginal bleeding? No   11. Have you ever had a blood transfusion? YES - after hysterectomy - had uterine artery bleed - not cauterized     11a. Have you ever had a transfusion reaction? No   12. Are you willing to have a blood transfusion if it is medically needed before, during, or after your surgery? Yes   13. Have you or any of your relatives ever had problems with anesthesia? No   14. Do you have sleep apnea, excessive snoring or daytime drowsiness? No   15. Do you have any artifical heart valves or other implanted medical devices like a pacemaker, defibrillator, or continuous glucose monitor? No   16. Do you have artificial joints? No   17. Are you allergic to latex? No   18. Is there any chance that you  may be pregnant? No     Health Care Directive:  Patient does not have a Health Care Directive or Living Will:     Preoperative Review of :   reviewed - controlled substances reflected in medication list.       Status of Chronic Conditions:  See problem list for active medical problems.  Problems all longstanding and stable, except as noted/documented.  See ROS for pertinent symptoms related to these conditions.      Review of Systems  CONSTITUTIONAL: NEGATIVE for fever, chills, change in weight  INTEGUMENTARY/SKIN: NEGATIVE for worrisome rashes, moles or lesions  EYES: NEGATIVE for vision changes or irritation  ENT/MOUTH: NEGATIVE for ear, mouth and throat problems  RESP: NEGATIVE for significant cough or SOB  CV: NEGATIVE for chest pain, palpitations or peripheral edema  GI: NEGATIVE for nausea, abdominal pain, heartburn, or change in bowel habits  : NEGATIVE for frequency, dysuria, or hematuria  MUSCULOSKELETAL: NEGATIVE for significant arthralgias or myalgia  NEURO: NEGATIVE for weakness, dizziness or paresthesias  ENDOCRINE: NEGATIVE for temperature intolerance, skin/hair changes  HEME: NEGATIVE for bleeding problems  PSYCHIATRIC: NEGATIVE for changes in mood or affect    Patient Active Problem List    Diagnosis Date Noted     Abdominal pain, generalized 12/29/2021     Priority: Medium     Pyelonephritis 12/29/2021     Priority: Medium     Right flank pain 12/29/2021     Priority: Medium     Fever 01/14/2015     Priority: Medium     Endometriosis 09/12/2014     Priority: Medium     Problem list name updated by automated process. Provider to review       Abdominal pain, left lower quadrant 05/27/2014     Priority: Medium     Concentration deficit 09/17/2013     Priority: Medium     Dysthymia 11/29/2011     Priority: Medium     CARDIOVASCULAR SCREENING; LDL GOAL LESS THAN 160 10/31/2010     Priority: Medium      Past Medical History:   Diagnosis Date     ADD (attention deficit disorder)      ASCUS favor  benign 2/2014    neg hpv cotest in 3 yrs     depression 2000    Not currently on meds     Depressive disorder      History of blood transfusion 4/2013    6 units after hysterectomy     Menarche age 12     Pelvic pain      Past Surgical History:   Procedure Laterality Date     BIOPSY       BLADDER SURGERY       C/SECTION, LOW TRANSVERSE        x2     CERVICECTOMY (TRACHELECTOMY) N/A 1/9/2015    not done     COLONOSCOPY  6/12/2014    Procedure: COMBINED COLONOSCOPY, SINGLE BIOPSY/POLYPECTOMY BY BIOPSY;  Surgeon: Elizabeth Shepard MD;  [facility]     CYSTOSCOPY  4/5/2013    Procedure: CYSTOSCOPY;;  Surgeon: Mariel Smith MD;  [facility]     LABIALPLASTY  4/4/2013    Procedure: LABIALPLASTY;;  Surgeon: Leticia Staley MD;  [facility]     LAPAROSCOPIC HYSTERECTOMY SUPRACERVICAL  4/4/2013    simple hyperplasiaProcedure: LAPAROSCOPIC HYSTERECTOMY SUPRACERVICAL;  Laparoscopic Supracervical Hysterectomy, left labialplasty;  Surgeon: Leticia Staley MD;  [facility]     LAPAROSCOPIC LYSIS ADHESIONS N/A 1/9/2015    Procedure: LAPAROSCOPIC LYSIS ADHESIONS;  Surgeon: Sue Johnston MD;  [facility]     LAPAROSCOPIC TUBAL LIGATION  3/7/2013    Procedure: LAPAROSCOPIC TUBAL LIGATION;  Bilateral Laparoscopic Tubal Ligation With Intrauterine Device Removal ;  Surgeon: Jaime Patrick MD;  [facility]     LAPAROSCOPY DIAGNOSTIC (GYN)  4/5/2013    Procedure: LAPAROSCOPY DIAGNOSTIC (GYN);;  Surgeon: Mariel Smith MD;  [facility]     LAPAROSCOPY OPERATIVE ADULT N/A 1/9/2015    Procedure: LAPAROSCOPY OPERATIVE ADULT;  Surgeon: Sue Johnston MD;  [facility]     LAPAROTOMY EXPLORATORY  4/5/2013    Procedure: LAPAROTOMY EXPLORATORY;  ligasure of pedicles and cervical stump;  Surgeon: Mariel Smith MD;  [facility]     Current Outpatient Medications   Medication Sig Dispense Refill     B Complex-C-Folic Acid (B COMPLEX + C TR PO)         "cetirizine (ZYRTEC) 10 MG tablet Take 10 mg by mouth daily       fish oil-omega-3 fatty acids 1000 MG capsule Take 1,000 mg by mouth       methylphenidate (RITALIN LA) 20 MG 24 hr capsule Take 20 mg by mouth daily 30 capsule 0     Prenatal Vit-Fe Fumarate-FA (PRENATAL VITAMIN PO)        SUMAtriptan (IMITREX) 50 MG tablet Take 50 mg by mouth at onset of headache for migraine       vitamin (B COMPLEX-C) tablet          Allergies   Allergen Reactions     Gluten Meal      Iodine         Social History     Tobacco Use     Smoking status: Never Smoker     Smokeless tobacco: Never Used   Substance Use Topics     Alcohol use: No     Comment: social       History   Drug Use No         Objective     /85   Pulse 74   Temp 98.9  F (37.2  C)   Ht 1.651 m (5' 5\")   Wt 88.4 kg (194 lb 14.4 oz)   LMP 03/09/2013   SpO2 100%   BMI 32.43 kg/m      Physical Exam    GENERAL APPEARANCE: healthy, alert and no distress     EYES: EOMI, PERRL     HENT: ear canals and TM's normal and nose and mouth without ulcers or lesions     NECK: no adenopathy, no asymmetry, masses, or scars and thyroid normal to palpation     RESP: lungs clear to auscultation - no rales, rhonchi or wheezes     CV: regular rates and rhythm, normal S1 S2, no S3 or S4 and no murmur, click or rub     ABDOMEN:  soft, nontender, no HSM or masses and bowel sounds normal     MS: extremities normal- no gross deformities noted, no evidence of inflammation in joints, FROM in all extremities.     SKIN: no suspicious lesions or rashes     NEURO: Normal strength and tone, sensory exam grossly normal, mentation intact and speech normal     PSYCH: mentation appears normal. and affect normal/bright     LYMPHATICS: No cervical adenopathy    Recent Labs   Lab Test 01/24/22  1231 12/31/21  0738   HGB 13.1 10.7*    106*    137   POTASSIUM 3.8 3.6   CR 0.66 0.93        Diagnostics:  Recent Results (from the past 168 hour(s))   Asymptomatic COVID-19 Virus " (Coronavirus) by PCR Nose    Collection Time: 03/04/22  2:29 PM    Specimen: Nose; Swab   Result Value Ref Range    SARS CoV2 PCR Negative Negative, Testing sent to reference lab. Results will be returned via unsolicited result   Comprehensive metabolic panel (BMP + Alb, Alk Phos, ALT, AST, Total. Bili, TP)    Collection Time: 03/04/22  2:35 PM   Result Value Ref Range    Sodium 141 133 - 144 mmol/L    Potassium 4.0 3.4 - 5.3 mmol/L    Chloride 109 94 - 109 mmol/L    Carbon Dioxide (CO2) 25 20 - 32 mmol/L    Anion Gap 7 3 - 14 mmol/L    Urea Nitrogen 10 7 - 30 mg/dL    Creatinine 0.73 0.52 - 1.04 mg/dL    Calcium 9.2 8.5 - 10.1 mg/dL    Glucose 83 70 - 99 mg/dL    Alkaline Phosphatase 59 40 - 150 U/L    AST 15 0 - 45 U/L    ALT 23 0 - 50 U/L    Protein Total 7.5 6.8 - 8.8 g/dL    Albumin 3.8 3.4 - 5.0 g/dL    Bilirubin Total 0.2 0.2 - 1.3 mg/dL    GFR Estimate >90 >60 mL/min/1.73m2   CBC with platelets    Collection Time: 03/04/22  2:35 PM   Result Value Ref Range    WBC Count 7.6 4.0 - 11.0 10e3/uL    RBC Count 4.71 3.80 - 5.20 10e6/uL    Hemoglobin 14.1 11.7 - 15.7 g/dL    Hematocrit 43.6 35.0 - 47.0 %    MCV 93 78 - 100 fL    MCH 29.9 26.5 - 33.0 pg    MCHC 32.3 31.5 - 36.5 g/dL    RDW 12.7 10.0 - 15.0 %    Platelet Count 221 150 - 450 10e3/uL      No EKG required, no history of coronary heart disease, significant arrhythmia, peripheral arterial disease or other structural heart disease.    Revised Cardiac Risk Index (RCRI):  The patient has the following serious cardiovascular risks for perioperative complications:   - No serious cardiac risks = 0 points     RCRI Interpretation: 0 points: Class I (very low risk - 0.4% complication rate)           Signed Electronically by: Elle Billy DO  Copy of this evaluation report is provided to requesting physician.

## 2022-03-03 NOTE — PROGRESS NOTES
[facility]  [address], [address]  [facility] [address]  Phone: [phone]  Primary Provider: Jovanny Zayas        PREOPERATIVE EVALUATION:  Today's date: 3/4/2022    Nel Durán is a 37 year old female who presents for a preoperative evaluation.    Surgical Information:  Surgery/Procedure:  Left Nephrectomy, partial, robot-assisted, laparoscopic  Surgery Location: [facility]  Surgeon: Matthew Aranda MD  Surgery Date: 3/8/2022  Time of Surgery: 12pm  Where patient plans to recover: At home with family  Fax number for surgical facility: Note does not need to be faxed, will be available electronically in Epic.    Type of Anesthesia Anticipated: General    Assessment & Plan     The proposed surgical procedure is considered INTERMEDIATE risk.    Preop general physical exam     - Comprehensive metabolic panel (BMP + Alb, Alk Phos, ALT, AST, Total. Bili, TP); Future  - CBC with platelets; Future  - Comprehensive metabolic panel (BMP + Alb, Alk Phos, ALT, AST, Total. Bili, TP)  - CBC with platelets    Left kidney mass     - Comprehensive metabolic panel (BMP + Alb, Alk Phos, ALT, AST, Total. Bili, TP); Future  - CBC with platelets; Future  - Comprehensive metabolic panel (BMP + Alb, Alk Phos, ALT, AST, Total. Bili, TP)  - CBC with platelets    Encounter for screening for other viral diseases     - Asymptomatic COVID-19 Virus (Coronavirus) by PCR Nose         Risks and Recommendations:  The patient has the following additional risks and recommendations for perioperative complications:   - No identified additional risk factors other than previously addressed    Medication Instructions:  pt will hold all medications -   Aware to stop any NSAIDs and the omega fish oil for one week prior     RECOMMENDATION:  APPROVAL GIVEN to proceed with proposed procedure, without further diagnostic evaluation.                      Answers for HPI/ROS submitted by the  patient on 3/4/2022  If you checked off any problems, how difficult have these problems made it for you to do your work, take care of things at home, or get along with other people?: Somewhat difficult  PHQ9 TOTAL SCORE: 5        Subjective     HPI related to upcoming procedure: enlarging kidney mass       Preop Questions 3/4/2022   1. Have you ever had a heart attack or stroke? No   2. Have you ever had surgery on your heart or blood vessels, such as a stent placement, a coronary artery bypass, or surgery on an artery in your head, neck, heart, or legs? No   3. Do you have chest pain with activity? No   4. Do you have a history of  heart failure? No   5. Do you currently have a cold, bronchitis or symptoms of other infection? No   6. Do you have a cough, shortness of breath, or wheezing? No   7. Do you or anyone in your family have previous history of blood clots? No   8. Do you or does anyone in your family have a serious bleeding problem such as prolonged bleeding following surgeries or cuts? No   9. Have you ever had problems with anemia or been told to take iron pills? YES - due to complication from hysterectomy   10. Have you had any abnormal blood loss such as black, tarry or bloody stools, or abnormal vaginal bleeding? No   11. Have you ever had a blood transfusion? YES - after hysterectomy - had uterine artery bleed - not cauterized     11a. Have you ever had a transfusion reaction? No   12. Are you willing to have a blood transfusion if it is medically needed before, during, or after your surgery? Yes   13. Have you or any of your relatives ever had problems with anesthesia? No   14. Do you have sleep apnea, excessive snoring or daytime drowsiness? No   15. Do you have any artifical heart valves or other implanted medical devices like a pacemaker, defibrillator, or continuous glucose monitor? No   16. Do you have artificial joints? No   17. Are you allergic to latex? No   18. Is there any chance that you  may be pregnant? No     Health Care Directive:  Patient does not have a Health Care Directive or Living Will:     Preoperative Review of :   reviewed - controlled substances reflected in medication list.       Status of Chronic Conditions:  See problem list for active medical problems.  Problems all longstanding and stable, except as noted/documented.  See ROS for pertinent symptoms related to these conditions.      Review of Systems  CONSTITUTIONAL: NEGATIVE for fever, chills, change in weight  INTEGUMENTARY/SKIN: NEGATIVE for worrisome rashes, moles or lesions  EYES: NEGATIVE for vision changes or irritation  ENT/MOUTH: NEGATIVE for ear, mouth and throat problems  RESP: NEGATIVE for significant cough or SOB  CV: NEGATIVE for chest pain, palpitations or peripheral edema  GI: NEGATIVE for nausea, abdominal pain, heartburn, or change in bowel habits  : NEGATIVE for frequency, dysuria, or hematuria  MUSCULOSKELETAL: NEGATIVE for significant arthralgias or myalgia  NEURO: NEGATIVE for weakness, dizziness or paresthesias  ENDOCRINE: NEGATIVE for temperature intolerance, skin/hair changes  HEME: NEGATIVE for bleeding problems  PSYCHIATRIC: NEGATIVE for changes in mood or affect    Patient Active Problem List    Diagnosis Date Noted     Abdominal pain, generalized 12/29/2021     Priority: Medium     Pyelonephritis 12/29/2021     Priority: Medium     Right flank pain 12/29/2021     Priority: Medium     Fever 01/14/2015     Priority: Medium     Endometriosis 09/12/2014     Priority: Medium     Problem list name updated by automated process. Provider to review       Abdominal pain, left lower quadrant 05/27/2014     Priority: Medium     Concentration deficit 09/17/2013     Priority: Medium     Dysthymia 11/29/2011     Priority: Medium     CARDIOVASCULAR SCREENING; LDL GOAL LESS THAN 160 10/31/2010     Priority: Medium      Past Medical History:   Diagnosis Date     ADD (attention deficit disorder)      ASCUS favor  benign 2/2014    neg hpv cotest in 3 yrs     depression 2000    Not currently on meds     Depressive disorder      History of blood transfusion 4/2013    6 units after hysterectomy     Menarche age 12     Pelvic pain      Past Surgical History:   Procedure Laterality Date     BIOPSY       BLADDER SURGERY       C/SECTION, LOW TRANSVERSE        x2     CERVICECTOMY (TRACHELECTOMY) N/A 1/9/2015    not done     COLONOSCOPY  6/12/2014    Procedure: COMBINED COLONOSCOPY, SINGLE BIOPSY/POLYPECTOMY BY BIOPSY;  Surgeon: Elizabeth Shepard MD;  [facility]     CYSTOSCOPY  4/5/2013    Procedure: CYSTOSCOPY;;  Surgeon: Mariel Smith MD;  [facility]     LABIALPLASTY  4/4/2013    Procedure: LABIALPLASTY;;  Surgeon: Leticia Staley MD;  [facility]     LAPAROSCOPIC HYSTERECTOMY SUPRACERVICAL  4/4/2013    simple hyperplasiaProcedure: LAPAROSCOPIC HYSTERECTOMY SUPRACERVICAL;  Laparoscopic Supracervical Hysterectomy, left labialplasty;  Surgeon: Leticia Staley MD;  [facility]     LAPAROSCOPIC LYSIS ADHESIONS N/A 1/9/2015    Procedure: LAPAROSCOPIC LYSIS ADHESIONS;  Surgeon: Sue Johnston MD;  [facility]     LAPAROSCOPIC TUBAL LIGATION  3/7/2013    Procedure: LAPAROSCOPIC TUBAL LIGATION;  Bilateral Laparoscopic Tubal Ligation With Intrauterine Device Removal ;  Surgeon: Jaime Patrick MD;  [facility]     LAPAROSCOPY DIAGNOSTIC (GYN)  4/5/2013    Procedure: LAPAROSCOPY DIAGNOSTIC (GYN);;  Surgeon: Mariel Smith MD;  [facility]     LAPAROSCOPY OPERATIVE ADULT N/A 1/9/2015    Procedure: LAPAROSCOPY OPERATIVE ADULT;  Surgeon: Sue Johnston MD;  [facility]     LAPAROTOMY EXPLORATORY  4/5/2013    Procedure: LAPAROTOMY EXPLORATORY;  ligasure of pedicles and cervical stump;  Surgeon: Mariel Smith MD;  [facility]     Current Outpatient Medications   Medication Sig Dispense Refill     B Complex-C-Folic Acid (B COMPLEX + C TR PO)         "cetirizine (ZYRTEC) 10 MG tablet Take 10 mg by mouth daily       fish oil-omega-3 fatty acids 1000 MG capsule Take 1,000 mg by mouth       methylphenidate (RITALIN LA) 20 MG 24 hr capsule Take 20 mg by mouth daily 30 capsule 0     Prenatal Vit-Fe Fumarate-FA (PRENATAL VITAMIN PO)        SUMAtriptan (IMITREX) 50 MG tablet Take 50 mg by mouth at onset of headache for migraine       vitamin (B COMPLEX-C) tablet          Allergies   Allergen Reactions     Gluten Meal      Iodine         Social History     Tobacco Use     Smoking status: Never Smoker     Smokeless tobacco: Never Used   Substance Use Topics     Alcohol use: No     Comment: social       History   Drug Use No         Objective     /85   Pulse 74   Temp 98.9  F (37.2  C)   Ht 1.651 m (5' 5\")   Wt 88.4 kg (194 lb 14.4 oz)   LMP 03/09/2013   SpO2 100%   BMI 32.43 kg/m      Physical Exam    GENERAL APPEARANCE: healthy, alert and no distress     EYES: EOMI, PERRL     HENT: ear canals and TM's normal and nose and mouth without ulcers or lesions     NECK: no adenopathy, no asymmetry, masses, or scars and thyroid normal to palpation     RESP: lungs clear to auscultation - no rales, rhonchi or wheezes     CV: regular rates and rhythm, normal S1 S2, no S3 or S4 and no murmur, click or rub     ABDOMEN:  soft, nontender, no HSM or masses and bowel sounds normal     MS: extremities normal- no gross deformities noted, no evidence of inflammation in joints, FROM in all extremities.     SKIN: no suspicious lesions or rashes     NEURO: Normal strength and tone, sensory exam grossly normal, mentation intact and speech normal     PSYCH: mentation appears normal. and affect normal/bright     LYMPHATICS: No cervical adenopathy    Recent Labs   Lab Test 01/24/22  1231 12/31/21  0738   HGB 13.1 10.7*    106*    137   POTASSIUM 3.8 3.6   CR 0.66 0.93        Diagnostics:  Recent Results (from the past 168 hour(s))   Asymptomatic COVID-19 Virus " (Coronavirus) by PCR Nose    Collection Time: 03/04/22  2:29 PM    Specimen: Nose; Swab   Result Value Ref Range    SARS CoV2 PCR Negative Negative, Testing sent to reference lab. Results will be returned via unsolicited result   Comprehensive metabolic panel (BMP + Alb, Alk Phos, ALT, AST, Total. Bili, TP)    Collection Time: 03/04/22  2:35 PM   Result Value Ref Range    Sodium 141 133 - 144 mmol/L    Potassium 4.0 3.4 - 5.3 mmol/L    Chloride 109 94 - 109 mmol/L    Carbon Dioxide (CO2) 25 20 - 32 mmol/L    Anion Gap 7 3 - 14 mmol/L    Urea Nitrogen 10 7 - 30 mg/dL    Creatinine 0.73 0.52 - 1.04 mg/dL    Calcium 9.2 8.5 - 10.1 mg/dL    Glucose 83 70 - 99 mg/dL    Alkaline Phosphatase 59 40 - 150 U/L    AST 15 0 - 45 U/L    ALT 23 0 - 50 U/L    Protein Total 7.5 6.8 - 8.8 g/dL    Albumin 3.8 3.4 - 5.0 g/dL    Bilirubin Total 0.2 0.2 - 1.3 mg/dL    GFR Estimate >90 >60 mL/min/1.73m2   CBC with platelets    Collection Time: 03/04/22  2:35 PM   Result Value Ref Range    WBC Count 7.6 4.0 - 11.0 10e3/uL    RBC Count 4.71 3.80 - 5.20 10e6/uL    Hemoglobin 14.1 11.7 - 15.7 g/dL    Hematocrit 43.6 35.0 - 47.0 %    MCV 93 78 - 100 fL    MCH 29.9 26.5 - 33.0 pg    MCHC 32.3 31.5 - 36.5 g/dL    RDW 12.7 10.0 - 15.0 %    Platelet Count 221 150 - 450 10e3/uL      No EKG required, no history of coronary heart disease, significant arrhythmia, peripheral arterial disease or other structural heart disease.    Revised Cardiac Risk Index (RCRI):  The patient has the following serious cardiovascular risks for perioperative complications:   - No serious cardiac risks = 0 points     RCRI Interpretation: 0 points: Class I (very low risk - 0.4% complication rate)           Signed Electronically by: Elle Billy DO  Copy of this evaluation report is provided to requesting physician.

## 2022-03-04 ENCOUNTER — OFFICE VISIT (OUTPATIENT)
Dept: FAMILY MEDICINE | Facility: CLINIC | Age: 37
End: 2022-03-04
Payer: COMMERCIAL

## 2022-03-04 VITALS
SYSTOLIC BLOOD PRESSURE: 128 MMHG | HEART RATE: 74 BPM | TEMPERATURE: 98.9 F | HEIGHT: 65 IN | WEIGHT: 194.9 LBS | OXYGEN SATURATION: 100 % | DIASTOLIC BLOOD PRESSURE: 85 MMHG | BODY MASS INDEX: 32.47 KG/M2

## 2022-03-04 DIAGNOSIS — N28.89 LEFT KIDNEY MASS: ICD-10-CM

## 2022-03-04 DIAGNOSIS — Z11.59 ENCOUNTER FOR SCREENING FOR OTHER VIRAL DISEASES: ICD-10-CM

## 2022-03-04 DIAGNOSIS — Z01.818 PREOP GENERAL PHYSICAL EXAM: Primary | ICD-10-CM

## 2022-03-04 LAB
ERYTHROCYTE [DISTWIDTH] IN BLOOD BY AUTOMATED COUNT: 12.7 % (ref 10–15)
HCT VFR BLD AUTO: 43.6 % (ref 35–47)
HGB BLD-MCNC: 14.1 G/DL (ref 11.7–15.7)
MCH RBC QN AUTO: 29.9 PG (ref 26.5–33)
MCHC RBC AUTO-ENTMCNC: 32.3 G/DL (ref 31.5–36.5)
MCV RBC AUTO: 93 FL (ref 78–100)
PLATELET # BLD AUTO: 221 10E3/UL (ref 150–450)
RBC # BLD AUTO: 4.71 10E6/UL (ref 3.8–5.2)
WBC # BLD AUTO: 7.6 10E3/UL (ref 4–11)

## 2022-03-04 PROCEDURE — 99214 OFFICE O/P EST MOD 30 MIN: CPT | Performed by: FAMILY MEDICINE

## 2022-03-04 PROCEDURE — 36415 COLL VENOUS BLD VENIPUNCTURE: CPT | Performed by: FAMILY MEDICINE

## 2022-03-04 PROCEDURE — 85027 COMPLETE CBC AUTOMATED: CPT | Performed by: FAMILY MEDICINE

## 2022-03-04 PROCEDURE — 80053 COMPREHEN METABOLIC PANEL: CPT | Performed by: FAMILY MEDICINE

## 2022-03-04 PROCEDURE — U0003 INFECTIOUS AGENT DETECTION BY NUCLEIC ACID (DNA OR RNA); SEVERE ACUTE RESPIRATORY SYNDROME CORONAVIRUS 2 (SARS-COV-2) (CORONAVIRUS DISEASE [COVID-19]), AMPLIFIED PROBE TECHNIQUE, MAKING USE OF HIGH THROUGHPUT TECHNOLOGIES AS DESCRIBED BY CMS-2020-01-R: HCPCS | Performed by: FAMILY MEDICINE

## 2022-03-04 PROCEDURE — U0005 INFEC AGEN DETEC AMPLI PROBE: HCPCS | Performed by: FAMILY MEDICINE

## 2022-03-04 ASSESSMENT — PATIENT HEALTH QUESTIONNAIRE - PHQ9
SUM OF ALL RESPONSES TO PHQ QUESTIONS 1-9: 5
10. IF YOU CHECKED OFF ANY PROBLEMS, HOW DIFFICULT HAVE THESE PROBLEMS MADE IT FOR YOU TO DO YOUR WORK, TAKE CARE OF THINGS AT HOME, OR GET ALONG WITH OTHER PEOPLE: SOMEWHAT DIFFICULT
SUM OF ALL RESPONSES TO PHQ QUESTIONS 1-9: 5

## 2022-03-05 LAB
ALBUMIN SERPL-MCNC: 3.8 G/DL (ref 3.4–5)
ALP SERPL-CCNC: 59 U/L (ref 40–150)
ALT SERPL W P-5'-P-CCNC: 23 U/L (ref 0–50)
ANION GAP SERPL CALCULATED.3IONS-SCNC: 7 MMOL/L (ref 3–14)
AST SERPL W P-5'-P-CCNC: 15 U/L (ref 0–45)
BILIRUB SERPL-MCNC: 0.2 MG/DL (ref 0.2–1.3)
BUN SERPL-MCNC: 10 MG/DL (ref 7–30)
CALCIUM SERPL-MCNC: 9.2 MG/DL (ref 8.5–10.1)
CHLORIDE BLD-SCNC: 109 MMOL/L (ref 94–109)
CO2 SERPL-SCNC: 25 MMOL/L (ref 20–32)
CREAT SERPL-MCNC: 0.73 MG/DL (ref 0.52–1.04)
GFR SERPL CREATININE-BSD FRML MDRD: >90 ML/MIN/1.73M2
GLUCOSE BLD-MCNC: 83 MG/DL (ref 70–99)
POTASSIUM BLD-SCNC: 4 MMOL/L (ref 3.4–5.3)
PROT SERPL-MCNC: 7.5 G/DL (ref 6.8–8.8)
SARS-COV-2 RNA RESP QL NAA+PROBE: NEGATIVE
SODIUM SERPL-SCNC: 141 MMOL/L (ref 133–144)

## 2022-03-05 ASSESSMENT — PATIENT HEALTH QUESTIONNAIRE - PHQ9: SUM OF ALL RESPONSES TO PHQ QUESTIONS 1-9: 5

## 2022-03-07 ENCOUNTER — ANESTHESIA EVENT (OUTPATIENT)
Dept: SURGERY | Facility: CLINIC | Age: 37
End: 2022-03-07
Payer: COMMERCIAL

## 2022-03-08 ENCOUNTER — ANESTHESIA (OUTPATIENT)
Dept: SURGERY | Facility: CLINIC | Age: 37
End: 2022-03-08
Payer: COMMERCIAL

## 2022-03-08 ENCOUNTER — HOSPITAL ENCOUNTER (OUTPATIENT)
Facility: CLINIC | Age: 37
LOS: 1 days | Discharge: HOME | End: 2022-03-09
Attending: UROLOGY | Admitting: UROLOGY
Payer: COMMERCIAL

## 2022-03-08 DIAGNOSIS — N28.89 RENAL MASS: Primary | ICD-10-CM

## 2022-03-08 LAB
ABO/RH(D): NORMAL
ANTIBODY SCREEN: NEGATIVE
SPECIMEN EXPIRATION DATE: NORMAL

## 2022-03-08 PROCEDURE — 250N000011 HC RX IP 250 OP 636: Performed by: NURSE ANESTHETIST, CERTIFIED REGISTERED

## 2022-03-08 PROCEDURE — 250N000009 HC RX 250: Performed by: NURSE ANESTHETIST, CERTIFIED REGISTERED

## 2022-03-08 PROCEDURE — 999N000141 HC STATISTIC PRE-PROCEDURE NURSING ASSESSMENT: Performed by: UROLOGY

## 2022-03-08 PROCEDURE — 360N000080 HC SURGERY LEVEL 7, PER MIN: Performed by: UROLOGY

## 2022-03-08 PROCEDURE — 258N000001 HC RX 258: Performed by: UROLOGY

## 2022-03-08 PROCEDURE — 250N000009 HC RX 250: Performed by: UROLOGY

## 2022-03-08 PROCEDURE — 250N000011 HC RX IP 250 OP 636: Performed by: UROLOGY

## 2022-03-08 PROCEDURE — 250N000011 HC RX IP 250 OP 636: Performed by: STUDENT IN AN ORGANIZED HEALTH CARE EDUCATION/TRAINING PROGRAM

## 2022-03-08 PROCEDURE — 272N000001 HC OR GENERAL SUPPLY STERILE: Performed by: UROLOGY

## 2022-03-08 PROCEDURE — 36415 COLL VENOUS BLD VENIPUNCTURE: CPT | Performed by: ANESTHESIOLOGY

## 2022-03-08 PROCEDURE — 258N000003 HC RX IP 258 OP 636: Performed by: ANESTHESIOLOGY

## 2022-03-08 PROCEDURE — 250N000011 HC RX IP 250 OP 636: Performed by: ANESTHESIOLOGY

## 2022-03-08 PROCEDURE — 250N000011 HC RX IP 250 OP 636

## 2022-03-08 PROCEDURE — 50543 LAPARO PARTIAL NEPHRECTOMY: CPT | Mod: LT | Performed by: UROLOGY

## 2022-03-08 PROCEDURE — 76770 US EXAM ABDO BACK WALL COMP: CPT | Mod: 26 | Performed by: UROLOGY

## 2022-03-08 PROCEDURE — 370N000017 HC ANESTHESIA TECHNICAL FEE, PER MIN: Performed by: UROLOGY

## 2022-03-08 PROCEDURE — 86901 BLOOD TYPING SEROLOGIC RH(D): CPT | Performed by: ANESTHESIOLOGY

## 2022-03-08 PROCEDURE — 88331 PATH CONSLTJ SURG 1 BLK 1SPC: CPT | Mod: TC | Performed by: UROLOGY

## 2022-03-08 PROCEDURE — 88305 TISSUE EXAM BY PATHOLOGIST: CPT | Mod: TC | Performed by: UROLOGY

## 2022-03-08 PROCEDURE — 250N000025 HC SEVOFLURANE, PER MIN: Performed by: UROLOGY

## 2022-03-08 PROCEDURE — 86850 RBC ANTIBODY SCREEN: CPT | Performed by: ANESTHESIOLOGY

## 2022-03-08 PROCEDURE — 250N000009 HC RX 250: Performed by: ANESTHESIOLOGY

## 2022-03-08 PROCEDURE — 258N000003 HC RX IP 258 OP 636: Performed by: NURSE ANESTHETIST, CERTIFIED REGISTERED

## 2022-03-08 PROCEDURE — 710N000009 HC RECOVERY PHASE 1, LEVEL 1, PER MIN: Performed by: UROLOGY

## 2022-03-08 PROCEDURE — P9041 ALBUMIN (HUMAN),5%, 50ML: HCPCS | Performed by: ANESTHESIOLOGY

## 2022-03-08 PROCEDURE — 258N000003 HC RX IP 258 OP 636: Performed by: STUDENT IN AN ORGANIZED HEALTH CARE EDUCATION/TRAINING PROGRAM

## 2022-03-08 RX ORDER — HYDROMORPHONE HCL IN WATER/PF 6 MG/30 ML
0.2 PATIENT CONTROLLED ANALGESIA SYRINGE INTRAVENOUS
Status: DISCONTINUED | OUTPATIENT
Start: 2022-03-08 | End: 2022-03-09 | Stop reason: HOSPADM

## 2022-03-08 RX ORDER — GLYCOPYRROLATE 0.2 MG/ML
INJECTION, SOLUTION INTRAMUSCULAR; INTRAVENOUS PRN
Status: DISCONTINUED | OUTPATIENT
Start: 2022-03-08 | End: 2022-03-08

## 2022-03-08 RX ORDER — SODIUM CHLORIDE, SODIUM LACTATE, POTASSIUM CHLORIDE, CALCIUM CHLORIDE 600; 310; 30; 20 MG/100ML; MG/100ML; MG/100ML; MG/100ML
INJECTION, SOLUTION INTRAVENOUS CONTINUOUS
Status: DISCONTINUED | OUTPATIENT
Start: 2022-03-08 | End: 2022-03-08 | Stop reason: HOSPADM

## 2022-03-08 RX ORDER — FENTANYL CITRATE 50 UG/ML
INJECTION, SOLUTION INTRAMUSCULAR; INTRAVENOUS PRN
Status: DISCONTINUED | OUTPATIENT
Start: 2022-03-08 | End: 2022-03-08

## 2022-03-08 RX ORDER — SODIUM CHLORIDE 9 MG/ML
INJECTION, SOLUTION INTRAVENOUS CONTINUOUS
Status: DISCONTINUED | OUTPATIENT
Start: 2022-03-08 | End: 2022-03-09

## 2022-03-08 RX ORDER — CEFAZOLIN SODIUM/WATER 2 G/20 ML
2 SYRINGE (ML) INTRAVENOUS SEE ADMIN INSTRUCTIONS
Status: DISCONTINUED | OUTPATIENT
Start: 2022-03-08 | End: 2022-03-08 | Stop reason: HOSPADM

## 2022-03-08 RX ORDER — NALOXONE HYDROCHLORIDE 0.4 MG/ML
0.2 INJECTION, SOLUTION INTRAMUSCULAR; INTRAVENOUS; SUBCUTANEOUS
Status: DISCONTINUED | OUTPATIENT
Start: 2022-03-08 | End: 2022-03-09 | Stop reason: HOSPADM

## 2022-03-08 RX ORDER — OXYCODONE HYDROCHLORIDE 5 MG/1
5 TABLET ORAL EVERY 4 HOURS PRN
Status: DISCONTINUED | OUTPATIENT
Start: 2022-03-08 | End: 2022-03-09 | Stop reason: HOSPADM

## 2022-03-08 RX ORDER — OXYCODONE HYDROCHLORIDE 5 MG/1
10 TABLET ORAL EVERY 4 HOURS PRN
Status: DISCONTINUED | OUTPATIENT
Start: 2022-03-08 | End: 2022-03-09 | Stop reason: HOSPADM

## 2022-03-08 RX ORDER — ALBUMIN, HUMAN INJ 5% 5 %
SOLUTION INTRAVENOUS CONTINUOUS PRN
Status: DISCONTINUED | OUTPATIENT
Start: 2022-03-08 | End: 2022-03-08

## 2022-03-08 RX ORDER — HYDROMORPHONE HYDROCHLORIDE 4 MG/ML
INJECTION, SOLUTION INTRAMUSCULAR; INTRAVENOUS; SUBCUTANEOUS PRN
Status: DISCONTINUED | OUTPATIENT
Start: 2022-03-08 | End: 2022-03-08

## 2022-03-08 RX ORDER — PROCHLORPERAZINE 25 MG
25 SUPPOSITORY, RECTAL RECTAL EVERY 12 HOURS PRN
Status: DISCONTINUED | OUTPATIENT
Start: 2022-03-08 | End: 2022-03-09 | Stop reason: HOSPADM

## 2022-03-08 RX ORDER — NALOXONE HYDROCHLORIDE 0.4 MG/ML
0.4 INJECTION, SOLUTION INTRAMUSCULAR; INTRAVENOUS; SUBCUTANEOUS
Status: DISCONTINUED | OUTPATIENT
Start: 2022-03-08 | End: 2022-03-09 | Stop reason: HOSPADM

## 2022-03-08 RX ORDER — CEFAZOLIN SODIUM/WATER 2 G/20 ML
2 SYRINGE (ML) INTRAVENOUS
Status: COMPLETED | OUTPATIENT
Start: 2022-03-08 | End: 2022-03-08

## 2022-03-08 RX ORDER — PROCHLORPERAZINE MALEATE 5 MG
5 TABLET ORAL EVERY 6 HOURS PRN
Status: DISCONTINUED | OUTPATIENT
Start: 2022-03-08 | End: 2022-03-09 | Stop reason: HOSPADM

## 2022-03-08 RX ORDER — VECURONIUM BROMIDE 1 MG/ML
INJECTION, POWDER, LYOPHILIZED, FOR SOLUTION INTRAVENOUS PRN
Status: DISCONTINUED | OUTPATIENT
Start: 2022-03-08 | End: 2022-03-08

## 2022-03-08 RX ORDER — BUPIVACAINE HYDROCHLORIDE 2.5 MG/ML
INJECTION, SOLUTION INFILTRATION; PERINEURAL PRN
Status: DISCONTINUED | OUTPATIENT
Start: 2022-03-08 | End: 2022-03-08 | Stop reason: HOSPADM

## 2022-03-08 RX ORDER — DEXMEDETOMIDINE HYDROCHLORIDE 4 UG/ML
INJECTION, SOLUTION INTRAVENOUS CONTINUOUS PRN
Status: DISCONTINUED | OUTPATIENT
Start: 2022-03-08 | End: 2022-03-08

## 2022-03-08 RX ORDER — ONDANSETRON 4 MG/1
4 TABLET, ORALLY DISINTEGRATING ORAL EVERY 30 MIN PRN
Status: DISCONTINUED | OUTPATIENT
Start: 2022-03-08 | End: 2022-03-08 | Stop reason: HOSPADM

## 2022-03-08 RX ORDER — ONDANSETRON 4 MG/1
4 TABLET, ORALLY DISINTEGRATING ORAL EVERY 6 HOURS PRN
Status: DISCONTINUED | OUTPATIENT
Start: 2022-03-08 | End: 2022-03-09 | Stop reason: HOSPADM

## 2022-03-08 RX ORDER — LIDOCAINE 40 MG/G
CREAM TOPICAL
Status: DISCONTINUED | OUTPATIENT
Start: 2022-03-08 | End: 2022-03-09 | Stop reason: HOSPADM

## 2022-03-08 RX ORDER — PROPOFOL 10 MG/ML
INJECTION, EMULSION INTRAVENOUS CONTINUOUS PRN
Status: DISCONTINUED | OUTPATIENT
Start: 2022-03-08 | End: 2022-03-08

## 2022-03-08 RX ORDER — ONDANSETRON 2 MG/ML
INJECTION INTRAMUSCULAR; INTRAVENOUS PRN
Status: DISCONTINUED | OUTPATIENT
Start: 2022-03-08 | End: 2022-03-08

## 2022-03-08 RX ORDER — HYDROMORPHONE HCL IN WATER/PF 6 MG/30 ML
0.4 PATIENT CONTROLLED ANALGESIA SYRINGE INTRAVENOUS
Status: DISCONTINUED | OUTPATIENT
Start: 2022-03-08 | End: 2022-03-09 | Stop reason: HOSPADM

## 2022-03-08 RX ORDER — MAGNESIUM HYDROXIDE 1200 MG/15ML
LIQUID ORAL PRN
Status: DISCONTINUED | OUTPATIENT
Start: 2022-03-08 | End: 2022-03-08 | Stop reason: HOSPADM

## 2022-03-08 RX ORDER — ONDANSETRON 2 MG/ML
4 INJECTION INTRAMUSCULAR; INTRAVENOUS EVERY 30 MIN PRN
Status: DISCONTINUED | OUTPATIENT
Start: 2022-03-08 | End: 2022-03-08 | Stop reason: HOSPADM

## 2022-03-08 RX ORDER — HYDROMORPHONE HCL IN WATER/PF 6 MG/30 ML
0.2 PATIENT CONTROLLED ANALGESIA SYRINGE INTRAVENOUS EVERY 5 MIN PRN
Status: DISCONTINUED | OUTPATIENT
Start: 2022-03-08 | End: 2022-03-08 | Stop reason: HOSPADM

## 2022-03-08 RX ORDER — DEXAMETHASONE SODIUM PHOSPHATE 4 MG/ML
INJECTION, SOLUTION INTRA-ARTICULAR; INTRALESIONAL; INTRAMUSCULAR; INTRAVENOUS; SOFT TISSUE PRN
Status: DISCONTINUED | OUTPATIENT
Start: 2022-03-08 | End: 2022-03-08

## 2022-03-08 RX ORDER — ONDANSETRON 2 MG/ML
4 INJECTION INTRAMUSCULAR; INTRAVENOUS EVERY 6 HOURS PRN
Status: DISCONTINUED | OUTPATIENT
Start: 2022-03-08 | End: 2022-03-09 | Stop reason: HOSPADM

## 2022-03-08 RX ORDER — OXYCODONE HYDROCHLORIDE 5 MG/1
5 TABLET ORAL EVERY 4 HOURS PRN
Status: DISCONTINUED | OUTPATIENT
Start: 2022-03-08 | End: 2022-03-08 | Stop reason: HOSPADM

## 2022-03-08 RX ORDER — PROPOFOL 10 MG/ML
INJECTION, EMULSION INTRAVENOUS PRN
Status: DISCONTINUED | OUTPATIENT
Start: 2022-03-08 | End: 2022-03-08

## 2022-03-08 RX ORDER — NEOSTIGMINE METHYLSULFATE 1 MG/ML
VIAL (ML) INJECTION PRN
Status: DISCONTINUED | OUTPATIENT
Start: 2022-03-08 | End: 2022-03-08

## 2022-03-08 RX ORDER — SODIUM CHLORIDE 9 MG/ML
INJECTION, SOLUTION INTRAVENOUS CONTINUOUS PRN
Status: DISCONTINUED | OUTPATIENT
Start: 2022-03-08 | End: 2022-03-08

## 2022-03-08 RX ORDER — LIDOCAINE HYDROCHLORIDE 20 MG/ML
INJECTION, SOLUTION INFILTRATION; PERINEURAL PRN
Status: DISCONTINUED | OUTPATIENT
Start: 2022-03-08 | End: 2022-03-08

## 2022-03-08 RX ORDER — FENTANYL CITRATE 0.05 MG/ML
25 INJECTION, SOLUTION INTRAMUSCULAR; INTRAVENOUS EVERY 5 MIN PRN
Status: DISCONTINUED | OUTPATIENT
Start: 2022-03-08 | End: 2022-03-08 | Stop reason: HOSPADM

## 2022-03-08 RX ORDER — ACETAMINOPHEN 325 MG/1
650 TABLET ORAL EVERY 6 HOURS PRN
Status: DISCONTINUED | OUTPATIENT
Start: 2022-03-08 | End: 2022-03-09 | Stop reason: HOSPADM

## 2022-03-08 RX ADMIN — LIDOCAINE HYDROCHLORIDE 60 MG: 20 INJECTION, SOLUTION INFILTRATION; PERINEURAL at 12:14

## 2022-03-08 RX ADMIN — MIDAZOLAM 2 MG: 1 INJECTION INTRAMUSCULAR; INTRAVENOUS at 12:08

## 2022-03-08 RX ADMIN — PHENYLEPHRINE HYDROCHLORIDE 100 MCG: 10 INJECTION INTRAVENOUS at 12:58

## 2022-03-08 RX ADMIN — DEXMEDETOMIDINE HYDROCHLORIDE 0.3 MCG/KG/HR: 200 INJECTION INTRAVENOUS at 12:46

## 2022-03-08 RX ADMIN — PROPOFOL 200 MG: 10 INJECTION, EMULSION INTRAVENOUS at 12:14

## 2022-03-08 RX ADMIN — GLYCOPYRROLATE 0.8 MG: 0.2 INJECTION, SOLUTION INTRAMUSCULAR; INTRAVENOUS at 15:59

## 2022-03-08 RX ADMIN — ALBUMIN HUMAN: 0.05 INJECTION, SOLUTION INTRAVENOUS at 13:47

## 2022-03-08 RX ADMIN — PHENYLEPHRINE HYDROCHLORIDE 100 MCG: 10 INJECTION INTRAVENOUS at 12:36

## 2022-03-08 RX ADMIN — PROCHLORPERAZINE EDISYLATE 5 MG: 5 INJECTION INTRAMUSCULAR; INTRAVENOUS at 20:20

## 2022-03-08 RX ADMIN — HYDROMORPHONE HYDROCHLORIDE 0.4 MG: 0.2 INJECTION, SOLUTION INTRAMUSCULAR; INTRAVENOUS; SUBCUTANEOUS at 20:27

## 2022-03-08 RX ADMIN — ROCURONIUM BROMIDE 50 MG: 50 INJECTION, SOLUTION INTRAVENOUS at 12:14

## 2022-03-08 RX ADMIN — PHENYLEPHRINE HYDROCHLORIDE 0.2 MCG/KG/MIN: 10 INJECTION INTRAVENOUS at 13:40

## 2022-03-08 RX ADMIN — ROCURONIUM BROMIDE 20 MG: 50 INJECTION, SOLUTION INTRAVENOUS at 13:08

## 2022-03-08 RX ADMIN — ROCURONIUM BROMIDE 20 MG: 50 INJECTION, SOLUTION INTRAVENOUS at 12:48

## 2022-03-08 RX ADMIN — FENTANYL CITRATE 25 MCG: 50 INJECTION, SOLUTION INTRAMUSCULAR; INTRAVENOUS at 17:09

## 2022-03-08 RX ADMIN — Medication 2 G: at 12:08

## 2022-03-08 RX ADMIN — PHENYLEPHRINE HYDROCHLORIDE 100 MCG: 10 INJECTION INTRAVENOUS at 15:26

## 2022-03-08 RX ADMIN — VECURONIUM BROMIDE 2 MG: 1 INJECTION, POWDER, LYOPHILIZED, FOR SOLUTION INTRAVENOUS at 15:15

## 2022-03-08 RX ADMIN — HYDROMORPHONE HYDROCHLORIDE 0.2 MG: 0.2 INJECTION, SOLUTION INTRAMUSCULAR; INTRAVENOUS; SUBCUTANEOUS at 17:33

## 2022-03-08 RX ADMIN — ONDANSETRON 4 MG: 2 INJECTION INTRAMUSCULAR; INTRAVENOUS at 17:09

## 2022-03-08 RX ADMIN — NEOSTIGMINE METHYLSULFATE 4.5 MG: 1 INJECTION, SOLUTION INTRAVENOUS at 15:59

## 2022-03-08 RX ADMIN — FENTANYL CITRATE 25 MCG: 50 INJECTION, SOLUTION INTRAMUSCULAR; INTRAVENOUS at 17:04

## 2022-03-08 RX ADMIN — ONDANSETRON 4 MG: 2 INJECTION INTRAMUSCULAR; INTRAVENOUS at 15:50

## 2022-03-08 RX ADMIN — SODIUM CHLORIDE: 9 INJECTION, SOLUTION INTRAVENOUS at 12:30

## 2022-03-08 RX ADMIN — SODIUM CHLORIDE: 9 INJECTION, SOLUTION INTRAVENOUS at 18:16

## 2022-03-08 RX ADMIN — VECURONIUM BROMIDE 4 MG: 1 INJECTION, POWDER, LYOPHILIZED, FOR SOLUTION INTRAVENOUS at 13:20

## 2022-03-08 RX ADMIN — FENTANYL CITRATE 50 MCG: 50 INJECTION, SOLUTION INTRAMUSCULAR; INTRAVENOUS at 15:12

## 2022-03-08 RX ADMIN — HYDROMORPHONE HYDROCHLORIDE 0.4 MG: 0.2 INJECTION, SOLUTION INTRAMUSCULAR; INTRAVENOUS; SUBCUTANEOUS at 18:26

## 2022-03-08 RX ADMIN — HYDROMORPHONE HYDROCHLORIDE 0.2 MG: 0.2 INJECTION, SOLUTION INTRAMUSCULAR; INTRAVENOUS; SUBCUTANEOUS at 17:28

## 2022-03-08 RX ADMIN — HYDROMORPHONE HYDROCHLORIDE 0.4 MG: 0.2 INJECTION, SOLUTION INTRAMUSCULAR; INTRAVENOUS; SUBCUTANEOUS at 22:40

## 2022-03-08 RX ADMIN — DEXAMETHASONE SODIUM PHOSPHATE 4 MG: 4 INJECTION, SOLUTION INTRA-ARTICULAR; INTRALESIONAL; INTRAMUSCULAR; INTRAVENOUS; SOFT TISSUE at 12:14

## 2022-03-08 RX ADMIN — SODIUM CHLORIDE, POTASSIUM CHLORIDE, SODIUM LACTATE AND CALCIUM CHLORIDE: 600; 310; 30; 20 INJECTION, SOLUTION INTRAVENOUS at 10:47

## 2022-03-08 RX ADMIN — PHENYLEPHRINE HYDROCHLORIDE 100 MCG: 10 INJECTION INTRAVENOUS at 13:00

## 2022-03-08 RX ADMIN — FENTANYL CITRATE 50 MCG: 50 INJECTION, SOLUTION INTRAMUSCULAR; INTRAVENOUS at 12:13

## 2022-03-08 RX ADMIN — HYDROMORPHONE HYDROCHLORIDE 0.5 MG: 4 INJECTION, SOLUTION INTRAMUSCULAR; INTRAVENOUS; SUBCUTANEOUS at 13:56

## 2022-03-08 RX ADMIN — PROPOFOL 30 MCG/KG/MIN: 10 INJECTION, EMULSION INTRAVENOUS at 12:14

## 2022-03-08 RX ADMIN — PHENYLEPHRINE HYDROCHLORIDE 100 MCG: 10 INJECTION INTRAVENOUS at 13:30

## 2022-03-08 RX ADMIN — VECURONIUM BROMIDE 3 MG: 1 INJECTION, POWDER, LYOPHILIZED, FOR SOLUTION INTRAVENOUS at 14:17

## 2022-03-08 RX ADMIN — PHENYLEPHRINE HYDROCHLORIDE 100 MCG: 10 INJECTION INTRAVENOUS at 13:23

## 2022-03-08 ASSESSMENT — ACTIVITIES OF DAILY LIVING (ADL)
ADLS_ACUITY_SCORE: 3
ADLS_ACUITY_SCORE: 10
ADLS_ACUITY_SCORE: 3
ADLS_ACUITY_SCORE: 3

## 2022-03-08 ASSESSMENT — ENCOUNTER SYMPTOMS: ORTHOPNEA: 0

## 2022-03-08 ASSESSMENT — LIFESTYLE VARIABLES: TOBACCO_USE: 0

## 2022-03-08 NOTE — ANESTHESIA CARE TRANSFER NOTE
Patient: Nel Durán    Procedure: Procedure(s):  NEPHRECTOMY, PARTIAL, ROBOT-ASSISTED, LAPAROSCOPIC WITH INTRAOPERATIVE ULTRASOUND       Diagnosis: Renal mass [N28.89]  Diagnosis Additional Information: No value filed.    Anesthesia Type:   General     Note:    Oropharynx: oropharynx clear of all foreign objects  Level of Consciousness: drowsy  Oxygen Supplementation: face mask  Level of Supplemental Oxygen (L/min / FiO2): 6  Independent Airway: airway patency satisfactory and stable  Dentition: dentition unchanged  Vital Signs Stable: post-procedure vital signs reviewed and stable  Report to RN Given: handoff report given  Patient transferred to: PACU    Handoff Report: Identifed the Patient, Identified the Reponsible Provider, Reviewed the pertinent medical history, Discussed the surgical course, Reviewed Intra-OP anesthesia mangement and issues during anesthesia, Set expectations for post-procedure period and Allowed opportunity for questions and acknowledgement of understanding      Vitals:  Vitals Value Taken Time   /78 03/08/22 1630   Temp     Pulse 71 03/08/22 1633   Resp 13 03/08/22 1633   SpO2 100 % 03/08/22 1633   Vitals shown include unvalidated device data.    Electronically Signed By: SYDNI Sunshine CRNA  March 8, 2022  4:34 PM

## 2022-03-08 NOTE — BRIEF OP NOTE
[facility]    Brief Operative Note    Pre-operative diagnosis: Renal mass [N28.89]  Post-operative diagnosis Same as pre-operative diagnosis    Procedure: Procedure(s):  NEPHRECTOMY, PARTIAL, ROBOT-ASSISTED, LAPAROSCOPIC WITH INTRAOPERATIVE ULTRASOUND  Surgeon: Surgeon(s) and Role:     * Matthew Aranda MD - Primary  Anesthesia: General   Estimated Blood Loss: 110cc    Drains: Desmond-Lewis  Specimens:   ID Type Source Tests Collected by Time Destination   1 : fat overlying tumor  Tissue Kidney, Left SURGICAL PATHOLOGY EXAM Matthew Aranda MD 3/8/2022  1:31 PM    2 : DEEP KIDNEY RESECTION MARGIN Tissue Kidney, Left SURGICAL PATHOLOGY EXAM Matthew Aranda MD 3/8/2022  3:08 PM    3 : left renal mass Tissue Other SURGICAL PATHOLOGY EXAM Matthew Aranda MD 3/8/2022  3:52 PM      Findings:   left lateral midpole tumor excised with negative base on frozen.Minor violation of collecting system. .  Complications: None.  Implants: * No implants in log *

## 2022-03-08 NOTE — ANESTHESIA PROCEDURE NOTES
Airway       Patient location during procedure: OR       Procedure Start/Stop Times: 3/8/2022 12:17 PM  Staff -        Anesthesiologist:  Xenia Mcmahon MD       CRNA: Tammy Benitez APRN CRNA       Other Anesthesia Staff: Mely Marks       Performed By: SRNA  Consent for Airway        Urgency: elective  Indications and Patient Condition       Indications for airway management: isaac-procedural       Induction type:intravenous       Mask difficulty assessment: 1 - vent by mask    Final Airway Details       Final airway type: endotracheal airway       Successful airway: ETT - single and Oral  Endotracheal Airway Details        ETT size (mm): 7.0       Cuffed: yes       Successful intubation technique: video laryngoscopy       VL Blade Size: Glidescope 3       Grade View of Cords: 1       Adjucts: stylet       Position: Right       Measured from: lips       Secured at (cm): 22       Bite block used: None    Post intubation assessment        Placement verified by: capnometry, equal breath sounds and chest rise        Number of attempts at approach: 1       Number of other approaches attempted: 0       Secured with: pink tape       Ease of procedure: easy       Dentition: Intact and Unchanged

## 2022-03-08 NOTE — OP NOTE
Procedure Date: 03/08/2022    ATTENDING SURGEON:  Matthew Aranda MD    ASSISTANT:  Dafne Corrales MD    PREOPERATIVE DIAGNOSIS:  Left renal mass.    POSTOPERATIVE DIAGNOSIS:  Left renal mass.    PROCEDURES PERFORMED:  Left-sided robotic-assisted laparoscopic partial nephrectomy, intraoperative ultrasound with interpretation of intraoperative ultrasound images.    ANESTHESIA:  General.    COMPLICATIONS:  None.    ESTIMATED BLOOD LOSS:  110 mL.    SPECIMENS:  1.  Main specimen of left renal mass.  2.  Fat overlying renal mass.  3.  Frozen section sent of deep kidney resection margin.    HISTORY OF PRESENT ILLNESS:  Nel Durán is a 37-year-old woman with an expanding left-sided renal mass.  She now presents for a partial nephrectomy.    DESCRIPTION OF PROCEDURE:  Risks and benefits of the procedure were explained in detail to the patient and informed consent was obtained.  The patient was brought to the operating room and placed supine on the operating table, where she underwent general endotracheal anesthetic.  A Morales catheter was placed.  She was then bumped up 45 degrees with the left side up and the table slightly flexed.  An axillary roll was placed.  The patient was secured to the table and we tilted the table to make certain that the patient was secured.  The abdomen was prepped and draped in standard sterile fashion.    At the beginning of the case, I tilted the table such that the umbilicus was pointing towards the ceiling.  I tented up the umbilicus and placed a Veress needle at this site.  I instilled CO2 to achieve 50 mmHg pressure.  I then placed a camera port just lateral to the umbilicus and then inserted the robotic camera to inspect the abdomen.  No injury had occurred.  Under direct visualization, I placed a right-sided robotic arm port and then a left-sided robotic arm ports beneath the xiphoid process.  The AirSeal assistant port was placed between the camera port and the left-sided arm  port.  The robot was brought into position and docked.    I first took down the white line of Toldt and incised the colon medially.  The kidney was easily identified.  I used intraoperative ultrasound to identify where the tumor was.  I then dissected down to the tumor and cleared perinephric fat surrounding the tip of the tumor.  The perinephric fat that was directly overlying the tumor was sent as a separate specimen, labeled fat overlying tumor.  This fat, however, peeled off quite easily and there was no concern for invasion.  I did ultrasound again to confirm the correct location.    I next cleared off the psoas muscle to identify the ureter and gonadal vein.  I marched up towards the hilum following both the ureter and gonadal vein until I identified the renal vein.  A single renal vein was identified and a single renal artery was identified and each vessel was cleared of its surrounding tissues.  I returned and then went back and did another ultrasound to identify the deep margin of the renal tumor.  I then scored the renal parenchyma with cautery to identify where my resection margins would be.  I then placed 2 bulldog clamps on the renal artery and 1 on renal vein and started the ischemia timer.  I then used scissors to dissect out the tumor with a wide margin circumferentially.  This left a fairly large defect in the kidney.  I inspected the base of the tumor and there were visually no concerns for a positive margin.  I did sample tissue from the deep margin in the center of my resection margin and sent this for frozen pathology.  This came back showing normal renal parenchyma eventually.  I then used a running 2-0 V-Loc suture to close vascular and renal collecting system injuries in the deep margin of the resection margin.  Once this was completed, I removed the renal vein bulldog and inspected the resection bed again.  There was no bleeding.  I then removed both bulldogs off of the renal artery and  again inspected the resection bed.  There was again no bleeding.  I made a note of the time, and there was 30 minutes and 30 seconds of warm ischemic time.  I then placed 2 Surgicel bolsters in the defect and tacked these in with a running 2-0 V-Loc suture, which also closed the renal defect around these bulldogs.  The needles were cut and removed from the body.  The bulldogs were removed from the body as well.  I brought the pressure down to 10 mmHg and there continued to be good hemostasis.  I reclosed the white line of Toldt with a running 3-0 V-Loc suture.  The right-sided instrument was removed and a Desmond-Lewis drain was placed at this site.  This was tacked to the skin level with 2-0 silk suture.  The robot was now de-docked and all trocars were removed.  I had to increase the size of the AirSeal assistant port by a small amount, so that I could remove the EndoCatch specimen through this site intact.  I reclosed the fascia at this site with a single figure-of-eight 0 Vicryl suture.  Skin incisions were all closed with 4-0 Monocryl stitches and covered with Dermabond.  The procedure was concluded at this point.    The patient tolerated the procedure well without complications.  She went to post-anesthetic care unit in good condition.  She will go to the hospital for overnight monitoring from there.    Matthew Aranda MD        D: 2022   T: 2022   MT: [id number]    Name:     LEX HUERTA  MRN:      [id number]        Account:        [id number]   :      1985           Procedure Date: 2022     Document: [id number]

## 2022-03-08 NOTE — PROGRESS NOTES
Dr. Koenig approved pt to Tx to U 2204.  A&Ox4, 2L NC--lungs clear, Tele: NSR, carlos patent w/ 100cc UOP in PACU, JO drain w/ 25cc output.  50mcg fent and 0.4mg dilaudid for pain control, zofran given also.  Incisions x3 c/d/i.

## 2022-03-08 NOTE — RESULT ENCOUNTER NOTE
Dear Nel,   Your test results are all back -   -Normal red blood cell (hgb) levels, normal white blood cell count and normal platelet levels.  -Liver and gallbladder tests are normal (ALT,AST, Alk phos, bilirubin), kidney function is normal (Cr, GFR), sodium is normal, potassium is normal, calcium is normal, glucose is normal.  Let us know if you have any questions.  -Elle Billy, DO

## 2022-03-08 NOTE — ANESTHESIA POSTPROCEDURE EVALUATION
Patient: Nel Durán    Procedure: Procedure(s):  NEPHRECTOMY, PARTIAL, ROBOT-ASSISTED, LAPAROSCOPIC WITH INTRAOPERATIVE ULTRASOUND       Anesthesia Type:  General    Note:  Disposition: Inpatient   Postop Pain Control: Uneventful            Sign Out: Well controlled pain   PONV: No   Neuro/Psych: Uneventful            Sign Out: Acceptable/Baseline neuro status   Airway/Respiratory: Uneventful            Sign Out: Acceptable/Baseline resp. status   CV/Hemodynamics: Uneventful            Sign Out: Acceptable CV status   Other NRE: NONE   DID A NON-ROUTINE EVENT OCCUR? No           Last vitals:  Vitals Value Taken Time   /68 03/08/22 1730   Temp 36.7  C (98.1  F) 03/08/22 1720   Pulse 76 03/08/22 1739   Resp 12 03/08/22 1739   SpO2 100 % 03/08/22 1740   Vitals shown include unvalidated device data.    Electronically Signed By: Juarez Koenig MD  March 8, 2022  5:55 PM

## 2022-03-08 NOTE — ANESTHESIA PREPROCEDURE EVALUATION
Anesthesia Pre-Procedure Evaluation    Patient: Nel Durán   MRN: [id number] : 1985        Procedure : Procedure(s):  NEPHRECTOMY, PARTIAL, ROBOT-ASSISTED, LAPAROSCOPIC WITH INTRAOPERATIVE ULTRASOUND          Past Medical History:   Diagnosis Date     ADD (attention deficit disorder)      ASCUS favor benign 2014    neg hpv cotest in 3 yrs     depression     Not currently on meds     Depressive disorder      History of blood transfusion 2013    6 units after hysterectomy     Menarche age 12     Pelvic pain       Past Surgical History:   Procedure Laterality Date     BIOPSY       BLADDER SURGERY       C/SECTION, LOW TRANSVERSE        x2     CERVICECTOMY (TRACHELECTOMY) N/A 2015    not done     COLONOSCOPY  2014    Procedure: COMBINED COLONOSCOPY, SINGLE BIOPSY/POLYPECTOMY BY BIOPSY;  Surgeon: Elizabeth Shepard MD;  Location: [facility]     CYSTOSCOPY  2013    Procedure: CYSTOSCOPY;;  Surgeon: Mariel Smith MD;  [facility]     LABIALPLASTY  2013    Procedure: LABIALPLASTY;;  Surgeon: Leticia Staley MD;  [facility]     LAPAROSCOPIC HYSTERECTOMY SUPRACERVICAL  2013    simple hyperplasiaProcedure: LAPAROSCOPIC HYSTERECTOMY SUPRACERVICAL;  Laparoscopic Supracervical Hysterectomy, left labialplasty;  Surgeon: Leticia Staley MD;  [facility]     LAPAROSCOPIC LYSIS ADHESIONS N/A 2015    Procedure: LAPAROSCOPIC LYSIS ADHESIONS;  Surgeon: Sue Johnston MD;  [facility]     LAPAROSCOPIC TUBAL LIGATION  3/7/2013    Procedure: LAPAROSCOPIC TUBAL LIGATION;  Bilateral Laparoscopic Tubal Ligation With Intrauterine Device Removal ;  Surgeon: Jaime Patrick MD;  [facility]     LAPAROSCOPY DIAGNOSTIC (GYN)  2013    Procedure: LAPAROSCOPY DIAGNOSTIC (GYN);;  Surgeon: Mariel Smith MD;  [facility]     LAPAROSCOPY OPERATIVE ADULT N/A 2015    Procedure: LAPAROSCOPY OPERATIVE ADULT;  Surgeon: Sue Johnston  MD Sandra;  [facility]     LAPAROTOMY EXPLORATORY  4/5/2013    Procedure: LAPAROTOMY EXPLORATORY;  ligasure of pedicles and cervical stump;  Surgeon: Mariel Smith MD;  [facility]      Allergies   Allergen Reactions     Gluten Meal      Iodine       Social History     Tobacco Use     Smoking status: Never Smoker     Smokeless tobacco: Never Used   Substance Use Topics     Alcohol use: No     Comment: social      Wt Readings from Last 1 Encounters:   03/04/22 88.4 kg (194 lb 14.4 oz)        Anesthesia Evaluation   Pt has had prior anesthetic.     No history of anesthetic complications       ROS/MED HX  ENT/Pulmonary:    (-) tobacco use, asthma and sleep apnea   Neurologic: Comment: ADD      Cardiovascular:    (-) ROB, orthopnea/PND and syncope   METS/Exercise Tolerance: >4 METS    Hematologic:     (+) history of blood transfusion,  (-) history of blood clots   Musculoskeletal:       GI/Hepatic:    (-) GERD   Renal/Genitourinary:    (-) renal disease   Endo: Comment: IMPRESSION:  1.  Right ovarian 3.6 cm hemorrhagic cyst and probable small right  hydrosalpinx. No right ovarian torsion. A follow-up ultrasound in 4-6  weeks should be considered to assess for stability.  2.  Normal left ovary.  3.  Small amount of free fluid in the right adnexa.  4.  Hypoechoic soft tissue thickening in the left rectus sheath  anterior to the bladder measures 4.4 x 4.1 x 1.9 cm. This could  represent a chronic rectus sheath hematoma, less likely a deposit  related to endometriosis or other neoplastic deposit. A follow-up CT  or ultrasound in 4-6 weeks is suggested to ensure stability.    Endometriosis , abd-pelvic pain    (+) Obesity,  (-) Type II DM   Psychiatric/Substance Use:     (+) psychiatric history depression     Infectious Disease:    (-) Recent Fever   Malignancy: Comment: Renal mass                                                                     IMPRESSION:   1.  Multiple cystic structures of the right  pelvis that may be from  the right ovary. The right ovary itself appears enlarged, and there is  small free pelvic fluid. Associated prominent edema of the fat planes  of the pelvis and right abdomen. Correlate with any evidence of  ovarian torsion or other acute ovarian abnormality. Recommend pelvic  ultrasound for further assessment.  2.  The appendix appears normal.  3.  An indeterminate larger complex solid and cystic mass at the lower  left kidney compared to the older CT from 2/5/2018. This is recently  stable in size compared to the renal MRI from 1/1/2022. This should be  considered renal neoplasm with further workup recommended.  4.  Supracervical hysterectomy.      Other:      (+) , H/O Chronic Pain,        Physical Exam    Airway        Mallampati: II   TM distance: > 3 FB   Neck ROM: full   Mouth opening: > 3 cm    Respiratory Devices and Support         Dental       (+) caps      Cardiovascular          Rhythm and rate: regular     Pulmonary           breath sounds clear to auscultation           OUTSIDE LABS:  CBC:   Lab Results   Component Value Date    WBC 7.6 03/04/2022    WBC 7.8 01/24/2022    HGB 14.1 03/04/2022    HGB 13.1 01/24/2022    HCT 43.6 03/04/2022    HCT 41.9 01/24/2022     03/04/2022     01/24/2022     BMP:   Lab Results   Component Value Date     03/04/2022     01/24/2022    POTASSIUM 4.0 03/04/2022    POTASSIUM 3.8 01/24/2022    CHLORIDE 109 03/04/2022    CHLORIDE 105 01/24/2022    CO2 25 03/04/2022    CO2 29 01/24/2022    BUN 10 03/04/2022    BUN 10 01/24/2022    CR 0.73 03/04/2022    CR 0.66 01/24/2022    GLC 83 03/04/2022    GLC 86 01/24/2022     COAGS:   Lab Results   Component Value Date    PTT 36 01/15/2015    INR 1.22 (H) 03/27/2018    FIBR 611 (H) 01/15/2015     POC:   Lab Results   Component Value Date     (H) 01/10/2015    HCG Negative 01/24/2022     HEPATIC:   Lab Results   Component Value Date    ALBUMIN 3.8 03/04/2022    PROTTOTAL 7.5  03/04/2022    ALT 23 03/04/2022    AST 15 03/04/2022    ALKPHOS 59 03/04/2022    BILITOTAL 0.2 03/04/2022     OTHER:   Lab Results   Component Value Date    LACT 0.7 12/29/2021    CADEN 9.2 03/04/2022    MAG 1.8 04/09/2013    LIPASE 21 09/20/2010    TSH 1.40 01/30/2013    CRP <2.9 08/18/2017    SED 9 08/18/2017       Anesthesia Plan    ASA Status:  2      Anesthesia Type: General.     - Airway: ETT         Techniques and Equipment:     - Airway: Video-Laryngoscope     - Lines/Monitors: 2nd IV     Consents    Anesthesia Plan(s) and associated risks, benefits, and realistic alternatives discussed. Questions answered and patient/representative(s) expressed understanding.    - Discussed:     - Discussed with:  Patient    Use of blood products discussed: Yes.     Postoperative Care       PONV prophylaxis: Ondansetron (or other 5HT-3), Dexamethasone or Solumedrol, Background Propofol Infusion     Comments:    Other Comments: PRBCs available before entering OR, h/o 6 units transfusion    Glidescope, ketamine, dexmed infusion, 2 IVs       Prior to Admission medications    Medication Sig Start Date End Date Taking? Authorizing Provider   B Complex-C-Folic Acid (B COMPLEX + C TR PO)    Yes Reported, Patient   cetirizine (ZYRTEC) 10 MG tablet Take 10 mg by mouth daily   Yes Reported, Patient   fish oil-omega-3 fatty acids 1000 MG capsule Take 1,000 mg by mouth   Yes Reported, Patient   SUMAtriptan (IMITREX) 50 MG tablet Take 50 mg by mouth at onset of headache for migraine   Yes Reported, Patient   methylphenidate (RITALIN LA) 20 MG 24 hr capsule Take 20 mg by mouth daily 10/26/21   Jovanny Zayas PA-C   Prenatal Vit-Fe Fumarate-FA (PRENATAL VITAMIN PO)     Reported, Patient   vitamin (B COMPLEX-C) tablet     Reported, Patient     Current Facility-Administered Medications Ordered in Epic   Medication Dose Route Frequency Last Rate Last Admin     ceFAZolin Sodium (ANCEF) injection 2 g  2 g Intravenous  Pre-Op/Pre-procedure x 1 dose         ceFAZolin Sodium (ANCEF) injection 2 g  2 g Intravenous See Admin Instructions         lactated ringers infusion   Intravenous Continuous 25 mL/hr at 03/08/22 1047 New Bag at 03/08/22 1047     lidocaine 1 % 0.1-1 mL  0.1-1 mL Other Q1H PRN         No current Epic-ordered outpatient medications on file.       lactated ringers 25 mL/hr at 03/08/22 1047     Recent Labs   Lab Test 01/02/15  1013   ABO O   RH  Pos     Recent Labs   Lab Test 01/24/22  1142   HCG Negative     No results found for this or any previous visit (from the past 744 hour(s)).         Xenia Mcmahon MD

## 2022-03-09 VITALS
SYSTOLIC BLOOD PRESSURE: 115 MMHG | HEART RATE: 70 BPM | BODY MASS INDEX: 32.76 KG/M2 | TEMPERATURE: 97.1 F | RESPIRATION RATE: 18 BRPM | WEIGHT: 196.6 LBS | HEIGHT: 65 IN | OXYGEN SATURATION: 98 % | DIASTOLIC BLOOD PRESSURE: 67 MMHG

## 2022-03-09 LAB
ANION GAP SERPL CALCULATED.3IONS-SCNC: 4 MMOL/L (ref 3–14)
BUN SERPL-MCNC: 11 MG/DL (ref 7–30)
CALCIUM SERPL-MCNC: 7.8 MG/DL (ref 8.5–10.1)
CHLORIDE BLD-SCNC: 110 MMOL/L (ref 94–109)
CO2 SERPL-SCNC: 24 MMOL/L (ref 20–32)
CREAT SERPL-MCNC: 0.87 MG/DL (ref 0.52–1.04)
ERYTHROCYTE [DISTWIDTH] IN BLOOD BY AUTOMATED COUNT: 12.6 % (ref 10–15)
GFR SERPL CREATININE-BSD FRML MDRD: 87 ML/MIN/1.73M2
GLUCOSE BLD-MCNC: 101 MG/DL (ref 70–99)
GLUCOSE BLDC GLUCOMTR-MCNC: 97 MG/DL (ref 70–99)
HCT VFR BLD AUTO: 34.3 % (ref 35–47)
HGB BLD-MCNC: 11 G/DL (ref 11.7–15.7)
MCH RBC QN AUTO: 29.3 PG (ref 26.5–33)
MCHC RBC AUTO-ENTMCNC: 32.1 G/DL (ref 31.5–36.5)
MCV RBC AUTO: 92 FL (ref 78–100)
PLATELET # BLD AUTO: 142 10E3/UL (ref 150–450)
POTASSIUM BLD-SCNC: 3.6 MMOL/L (ref 3.4–5.3)
RBC # BLD AUTO: 3.75 10E6/UL (ref 3.8–5.2)
SODIUM SERPL-SCNC: 138 MMOL/L (ref 133–144)
WBC # BLD AUTO: 9.5 10E3/UL (ref 4–11)

## 2022-03-09 PROCEDURE — 80048 BASIC METABOLIC PNL TOTAL CA: CPT | Performed by: STUDENT IN AN ORGANIZED HEALTH CARE EDUCATION/TRAINING PROGRAM

## 2022-03-09 PROCEDURE — 258N000003 HC RX IP 258 OP 636: Performed by: STUDENT IN AN ORGANIZED HEALTH CARE EDUCATION/TRAINING PROGRAM

## 2022-03-09 PROCEDURE — 250N000011 HC RX IP 250 OP 636: Performed by: STUDENT IN AN ORGANIZED HEALTH CARE EDUCATION/TRAINING PROGRAM

## 2022-03-09 PROCEDURE — 36415 COLL VENOUS BLD VENIPUNCTURE: CPT | Performed by: STUDENT IN AN ORGANIZED HEALTH CARE EDUCATION/TRAINING PROGRAM

## 2022-03-09 PROCEDURE — 82962 GLUCOSE BLOOD TEST: CPT

## 2022-03-09 PROCEDURE — 85027 COMPLETE CBC AUTOMATED: CPT | Performed by: STUDENT IN AN ORGANIZED HEALTH CARE EDUCATION/TRAINING PROGRAM

## 2022-03-09 RX ORDER — SENNA AND DOCUSATE SODIUM 50; 8.6 MG/1; MG/1
1 TABLET, FILM COATED ORAL AT BEDTIME
Qty: 30 TABLET | Refills: 0 | Status: SHIPPED | OUTPATIENT
Start: 2022-03-09 | End: 2022-07-11

## 2022-03-09 RX ORDER — OXYCODONE HYDROCHLORIDE 5 MG/1
5 TABLET ORAL EVERY 6 HOURS PRN
Qty: 12 TABLET | Refills: 0 | Status: SHIPPED | OUTPATIENT
Start: 2022-03-09 | End: 2022-07-08

## 2022-03-09 RX ORDER — ONDANSETRON 4 MG/1
4 TABLET, ORALLY DISINTEGRATING ORAL EVERY 8 HOURS PRN
Qty: 18 TABLET | Refills: 0 | Status: SHIPPED | OUTPATIENT
Start: 2022-03-09 | End: 2022-07-08

## 2022-03-09 RX ORDER — HYDROMORPHONE HYDROCHLORIDE 2 MG/1
2 TABLET ORAL EVERY 6 HOURS PRN
Qty: 10 TABLET | Refills: 0 | Status: SHIPPED | OUTPATIENT
Start: 2022-03-09 | End: 2022-03-12

## 2022-03-09 RX ADMIN — ONDANSETRON 4 MG: 2 INJECTION INTRAMUSCULAR; INTRAVENOUS at 07:58

## 2022-03-09 RX ADMIN — HYDROMORPHONE HYDROCHLORIDE 0.4 MG: 0.2 INJECTION, SOLUTION INTRAMUSCULAR; INTRAVENOUS; SUBCUTANEOUS at 14:48

## 2022-03-09 RX ADMIN — HYDROMORPHONE HYDROCHLORIDE 0.4 MG: 0.2 INJECTION, SOLUTION INTRAMUSCULAR; INTRAVENOUS; SUBCUTANEOUS at 05:46

## 2022-03-09 RX ADMIN — HYDROMORPHONE HYDROCHLORIDE 0.4 MG: 0.2 INJECTION, SOLUTION INTRAMUSCULAR; INTRAVENOUS; SUBCUTANEOUS at 10:50

## 2022-03-09 RX ADMIN — HYDROMORPHONE HYDROCHLORIDE 0.4 MG: 0.2 INJECTION, SOLUTION INTRAMUSCULAR; INTRAVENOUS; SUBCUTANEOUS at 00:19

## 2022-03-09 RX ADMIN — SODIUM CHLORIDE: 9 INJECTION, SOLUTION INTRAVENOUS at 04:04

## 2022-03-09 RX ADMIN — HYDROMORPHONE HYDROCHLORIDE 0.4 MG: 0.2 INJECTION, SOLUTION INTRAMUSCULAR; INTRAVENOUS; SUBCUTANEOUS at 07:57

## 2022-03-09 RX ADMIN — ONDANSETRON 4 MG: 4 TABLET, ORALLY DISINTEGRATING ORAL at 14:53

## 2022-03-09 RX ADMIN — HYDROMORPHONE HYDROCHLORIDE 0.2 MG: 0.2 INJECTION, SOLUTION INTRAMUSCULAR; INTRAVENOUS; SUBCUTANEOUS at 03:13

## 2022-03-09 NOTE — PLAN OF CARE
Goal Outcome Evaluation:  3/9/2022, [id number]  POD 0. A&Ox4. CMS intact. Bowel sounds hypoactive, - flatus, void' adequately. tolerating reg diet. VSS. Dressing WENDI to abd incision. JO removed. Up IND. C/o abd spasm pain, declines med. Pt scoring green on the Aggression Stop Light Tool. Education provided, understanding verbalized. Discharged home today.

## 2022-03-09 NOTE — PLAN OF CARE
Pt received around 6pm. Alert/oriented x4. VSS on RA. C/o incisional pain, managed with IV Dilaudid x2. Nausea controlled with Zofran x1 and Compazine x1, symptoms improved. JO in place with bright bloody output. 3 lap sites, CDI. Morales patent, good output. Infusing  ml/hr. Up indep in room. Continue to monitor

## 2022-03-09 NOTE — PLAN OF CARE
Goal Outcome Evaluation:    Pt here with renal mass. POD 1 from a lap nephrectomy. A & O x 4. CMS intact. Bowel sounds hypoactive. Flatus not present. Tolerating clear liquid diet. VSS on RA. Lap in on abd. WDL WENDI. JO dressing changed due to mod ouput. Mod bloody output to suction overnight. Stripped x2 overnight. Morales patent. PIV patent and infusing 100 ml/hr of NS. Up ind in room. C/O high to mod pain,  with diladid, per patient oxy causes nausea. No nausea reported. Slept hard overnight. Call light within reach, will cont to monitor. Discharge pending.

## 2022-03-09 NOTE — PLAN OF CARE
Summary: POD1 Laparoscopic partial nephrectomy   Orientation: A&Ox4   VS/ O2/ IV: VSS on RA. PIV SL  GI: intermittent nausea. Zofran given x2 this shift with relief   Pain Management: c/o abdominal pain 6/10, managed with dilaudid   Mobility: ambulated in sarmiento and room independently   Diet: tolerating regular diet. Adequate intake   Bowel/Bladder: gonzalez discontinued, voided 250ml yellow/clear output.  ml   Drains/Devices: JO with adequate serosanguinous output   Skin: lap sites x3 liquid bandage CDI   CMS: intact   Discharge/Plan: pending this afternoon

## 2022-03-09 NOTE — PROGRESS NOTES
"Urology  Progress Note    - had some trouble sleeping last night for multiple reasons  - pain well controlled however having some spasms  - tolerating sips, not very hungry  - has not been OOB yet    Exam  /63 (BP Location: Right arm, Patient Position: Semi-Eldridge's)   Pulse 76   Temp 98.2  F (36.8  C) (Oral)   Resp 18   Ht 1.651 m (5' 5\")   Wt 89.2 kg (196 lb 9.6 oz)   LMP 03/09/2013   SpO2 96%   BMI 32.72 kg/m    No acute distress  Unlabored breathing  Abdomen soft, nontender, nondistended. Incisions cdi closed with dermabond  JO serosanguinous  Morales with clear yellow urine in tubing    /NR  JO 85/NR    Labs  AM labs pending    Assessment/Plan  37 year old y/o female POD#1 s/p robotic left PNx for renal mass.     Neuro: tylenol, dilaudid, oxycodone for pain control  CV: HDS  Pulm: incentive spirometry while awake  FEN/GI: regular diet, dc MIVF  Endo: FÁTIMA  : dc Morales this AM. Monitor urine output. JO out PTD.   Heme: Hgb stable  ID: afebrile, no leukocystosis. Completed isaac-op abx.   Activity: up ad natali  PPx: SCDs.  Dispo: home today    Dafne Corrales MD      "

## 2022-03-12 LAB
PATH REPORT.COMMENTS IMP SPEC: NORMAL
PATH REPORT.COMMENTS IMP SPEC: NORMAL
PATH REPORT.FINAL DX SPEC: NORMAL
PATH REPORT.GROSS SPEC: NORMAL
PATH REPORT.INTRAOP OBS SPEC DOC: NORMAL
PATH REPORT.MICROSCOPIC SPEC OTHER STN: NORMAL
PATH REPORT.RELEVANT HX SPEC: NORMAL
PHOTO IMAGE: NORMAL

## 2022-03-12 PROCEDURE — 88342 IMHCHEM/IMCYTCHM 1ST ANTB: CPT | Mod: 26 | Performed by: PATHOLOGY

## 2022-03-12 PROCEDURE — 88341 IMHCHEM/IMCYTCHM EA ADD ANTB: CPT | Mod: 26 | Performed by: PATHOLOGY

## 2022-03-12 PROCEDURE — 88331 PATH CONSLTJ SURG 1 BLK 1SPC: CPT | Mod: 26 | Performed by: PATHOLOGY

## 2022-03-12 PROCEDURE — 88307 TISSUE EXAM BY PATHOLOGIST: CPT | Mod: 26 | Performed by: PATHOLOGY

## 2022-03-12 PROCEDURE — 88305 TISSUE EXAM BY PATHOLOGIST: CPT | Mod: 26 | Performed by: PATHOLOGY

## 2022-07-02 NOTE — PROGRESS NOTES
"Nel is a 37 year old who is being evaluated via a billable video visit.      How would you like to obtain your AVS? MyChart  If the video visit is dropped, the invitation should be resent by:   Will anyone else be joining your video visit? No        Assessment & Plan     Attention deficit hyperactivity disorder (ADHD), combined type  Will trial bump in dose, to see if more effective.    - methylphenidate (RITALIN LA) 30 MG 24 hr capsule; Take 1 capsule (30 mg) by mouth every morning    Other migraine without status migrainosus, not intractable  Stable, rare use  - SUMAtriptan (IMITREX) 50 MG tablet; Take 1 tablet (50 mg) by mouth at onset of headache for migraine  - ondansetron (ZOFRAN ODT) 4 MG ODT tab; Take 1 tablet (4 mg) by mouth every 8 hours as needed for nausea             BMI:   Estimated body mass index is 32.72 kg/m  as calculated from the following:    Height as of 3/8/22: 1.651 m (5' 5\").    Weight as of 3/8/22: 89.2 kg (196 lb 9.6 oz).           Return in about 3 months (around 10/8/2022) for e-visit/telephone.    Jovanny Zayas PA-C  [facility]   Nel is a 37 year old, presenting for the following health issues:  A.D.H.D      History of Present Illness       Reason for visit:  Med refill    She eats 2-3 servings of fruits and vegetables daily.She consumes 0 sweetened beverage(s) daily.She exercises with enough effort to increase her heart rate 60 or more minutes per day.  She exercises with enough effort to increase her heart rate 4 days per week. She is missing 3 dose(s) of medications per week.  She is not taking prescribed medications regularly due to other.       PATIENT IS DUE FOR PAP/PHYSICAL     ADHD Follow-Up    Date of last ADHD office visit:   Status since last visit: Stable  Taking controlled (daily) medications as prescribed: Yes                       Parent/Patient Concerns with Medications: maybe change medication  ADHD Medication     " Stimulants - Misc. Disp Start End     methylphenidate (RITALIN LA) 20 MG 24 hr capsule    30 capsule 10/26/2021     Sig - Route: Take 20 mg by mouth daily - Oral    Class: E-Prescribe    Earliest Fill Date: 10/26/2021          Sleep: no problems  Home/Family Concerns: None  Peer Concerns: None    Co-Morbid Diagnosis: None    Currently in counseling: No        Medication Benefits:   Controlled symptoms: Attention span, Distractability and Finishing tasks  Uncontrolled Symptoms: Attention span, Distractability and Finishing tasks    Medication side effects:  Side effects noted: none  Denies: appetite suppression, weight loss and insomnia    Not having the same benefits as when she started.  Wonders about dose adjustment.          Review of Systems   Constitutional, HEENT, cardiovascular, pulmonary, gi and gu systems are negative, except as otherwise noted.      Objective           Vitals:  No vitals were obtained today due to virtual visit.    Physical Exam   GENERAL: Healthy, alert and no distress  EYES: Eyes grossly normal to inspection.  No discharge or erythema, or obvious scleral/conjunctival abnormalities.  RESP: No audible wheeze, cough, or visible cyanosis.  No visible retractions or increased work of breathing.    SKIN: Visible skin clear. No significant rash, abnormal pigmentation or lesions.  NEURO: Cranial nerves grossly intact.  Mentation and speech appropriate for age.  PSYCH: Mentation appears normal, affect normal/bright, judgement and insight intact, normal speech and appearance well-groomed.                Video-Visit Details    Video Start Time: 9:37 AM    Type of service:  Video Visit    Video End Time:9:47 AM    Originating Location (pt. Location): Home    Distant Location (provider location):  [facility]     Platform used for Video Visit: [employer]

## 2022-07-08 ENCOUNTER — VIRTUAL VISIT (OUTPATIENT)
Dept: FAMILY MEDICINE | Facility: CLINIC | Age: 37
End: 2022-07-08
Payer: COMMERCIAL

## 2022-07-08 DIAGNOSIS — G43.809 OTHER MIGRAINE WITHOUT STATUS MIGRAINOSUS, NOT INTRACTABLE: ICD-10-CM

## 2022-07-08 DIAGNOSIS — F90.2 ATTENTION DEFICIT HYPERACTIVITY DISORDER (ADHD), COMBINED TYPE: Primary | ICD-10-CM

## 2022-07-08 PROCEDURE — 99213 OFFICE O/P EST LOW 20 MIN: CPT | Mod: 95 | Performed by: PHYSICIAN ASSISTANT

## 2022-07-08 RX ORDER — ONDANSETRON 4 MG/1
4 TABLET, ORALLY DISINTEGRATING ORAL EVERY 8 HOURS PRN
Qty: 18 TABLET | Refills: 0 | Status: SHIPPED | OUTPATIENT
Start: 2022-07-08 | End: 2024-04-04

## 2022-07-08 RX ORDER — METHYLPHENIDATE HYDROCHLORIDE 30 MG/1
30 CAPSULE, EXTENDED RELEASE ORAL EVERY MORNING
Qty: 30 CAPSULE | Refills: 0 | Status: SHIPPED | OUTPATIENT
Start: 2022-07-08 | End: 2022-08-23

## 2022-07-08 RX ORDER — SUMATRIPTAN 50 MG/1
50 TABLET, FILM COATED ORAL
Qty: 12 TABLET | Refills: 3 | Status: SHIPPED | OUTPATIENT
Start: 2022-07-08 | End: 2023-09-11

## 2022-07-08 ASSESSMENT — PAIN SCALES - GENERAL: PAINLEVEL: NO PAIN (0)

## 2022-07-08 NOTE — NURSING NOTE
"Chief Complaint   Patient presents with     A.D.H.D     initial LMP 03/09/2013  Estimated body mass index is 32.72 kg/m  as calculated from the following:    Height as of 3/8/22: 1.651 m (5' 5\").    Weight as of 3/8/22: 89.2 kg (196 lb 9.6 oz).  BP completed using cuff size: .  L  R arm      Health Maintenance that is potentially due pending provider review:      Ulices Hernandez ma  "

## 2022-08-19 DIAGNOSIS — F90.2 ATTENTION DEFICIT HYPERACTIVITY DISORDER (ADHD), COMBINED TYPE: ICD-10-CM

## 2022-08-23 RX ORDER — METHYLPHENIDATE HYDROCHLORIDE 30 MG/1
30 CAPSULE, EXTENDED RELEASE ORAL EVERY MORNING
Qty: 30 CAPSULE | Refills: 0 | Status: SHIPPED | OUTPATIENT
Start: 2022-08-23 | End: 2023-03-14

## 2022-10-11 ENCOUNTER — OFFICE VISIT (OUTPATIENT)
Dept: FAMILY MEDICINE | Facility: CLINIC | Age: 37
End: 2022-10-11
Payer: COMMERCIAL

## 2022-10-11 VITALS
OXYGEN SATURATION: 100 % | WEIGHT: 184 LBS | SYSTOLIC BLOOD PRESSURE: 123 MMHG | TEMPERATURE: 98.5 F | DIASTOLIC BLOOD PRESSURE: 86 MMHG | BODY MASS INDEX: 30.62 KG/M2 | HEART RATE: 77 BPM

## 2022-10-11 DIAGNOSIS — R07.0 THROAT PAIN: ICD-10-CM

## 2022-10-11 DIAGNOSIS — J06.9 VIRAL URI WITH COUGH: Primary | ICD-10-CM

## 2022-10-11 LAB
DEPRECATED S PYO AG THROAT QL EIA: NEGATIVE
GROUP A STREP BY PCR: NOT DETECTED

## 2022-10-11 PROCEDURE — 99213 OFFICE O/P EST LOW 20 MIN: CPT

## 2022-10-11 PROCEDURE — 87651 STREP A DNA AMP PROBE: CPT

## 2022-10-11 RX ORDER — PREDNISONE 20 MG/1
60 TABLET ORAL DAILY
Qty: 15 TABLET | Refills: 0 | Status: SHIPPED | OUTPATIENT
Start: 2022-10-11 | End: 2022-10-16

## 2022-10-11 RX ORDER — BENZONATATE 100 MG/1
100-200 CAPSULE ORAL 3 TIMES DAILY PRN
Qty: 30 CAPSULE | Refills: 0 | Status: SHIPPED | OUTPATIENT
Start: 2022-10-11 | End: 2022-10-18

## 2022-10-11 NOTE — PATIENT INSTRUCTIONS
Prednisone 60 mg daily for 3-5 days.    Continue OTC anti-inflammatory medications (either ibuprofen or naproxen).    Tessalon 1-2 pills every 8 hours as needed for coughing.    Lots of fluids.  Vocal rest as able.

## 2022-10-11 NOTE — PROGRESS NOTES
ICD-10-CM    1. Viral URI with cough  J06.9 predniSONE (DELTASONE) 20 MG tablet     benzonatate (TESSALON) 100 MG capsule      2. Throat pain  R07.0 Streptococcus A Rapid Scr w Reflx to PCR     Group A Streptococcus PCR Throat Swab        No stridor or other respiratory compromise at this time.      PLAN:  Patient Instructions   Prednisone 60 mg daily for 3-5 days.    Continue OTC anti-inflammatory medications (either ibuprofen or naproxen).    Tessalon 1-2 pills every 8 hours as needed for coughing.    Lots of fluids.  Vocal rest as able.    SUBJECTIVE:  Nel Durán is a 37 year old female who presents to  today after losing her voice about a week ago and then developing a cough 3 days later.  No known fevers.  +sore throat +runny nose.  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  OTC anti-inflammatories and Delsym have not been helping much.  Cough is sometimes productive of yellow mucus.  Has some soreness in upper chest, mostly to the right of the sternum.    OBJECTIVE:  /86 (BP Location: Right arm, Patient Position: Chair, Cuff Size: Adult Large)   Pulse 77   Temp 98.5  F (36.9  C) (Oral)   Wt 83.5 kg (184 lb)   LMP 03/09/2013   SpO2 100%   BMI 30.62 kg/m    GEN: mildly ill-appearing, squeaky voice, in NAD  ENT: TMs normal, oral MMM, mild cobblestoning of posterior pharynx, uvula midline  Neck: no LAD noted, trachea midline, no crepitus  Lungs:  CTAB, good air entry bilaterally  CV:  RRR    Results for orders placed or performed in visit on 10/11/22   Streptococcus A Rapid Scr w Reflx to PCR     Status: Normal    Specimen: Throat; Swab   Result Value Ref Range    Group A Strep antigen Negative Negative

## 2022-10-17 ENCOUNTER — VIRTUAL VISIT (OUTPATIENT)
Dept: FAMILY MEDICINE | Facility: CLINIC | Age: 37
End: 2022-10-17
Payer: COMMERCIAL

## 2022-10-17 DIAGNOSIS — F90.2 ATTENTION DEFICIT HYPERACTIVITY DISORDER (ADHD), COMBINED TYPE: Primary | ICD-10-CM

## 2022-10-17 PROCEDURE — 99213 OFFICE O/P EST LOW 20 MIN: CPT | Mod: 95 | Performed by: PHYSICIAN ASSISTANT

## 2022-10-17 RX ORDER — METHYLPHENIDATE HYDROCHLORIDE 30 MG/1
30 CAPSULE, EXTENDED RELEASE ORAL DAILY
Qty: 30 CAPSULE | Refills: 0 | Status: SHIPPED | OUTPATIENT
Start: 2022-12-18 | End: 2023-01-17

## 2022-10-17 RX ORDER — METHYLPHENIDATE HYDROCHLORIDE 30 MG/1
30 CAPSULE, EXTENDED RELEASE ORAL DAILY
Qty: 30 CAPSULE | Refills: 0 | Status: SHIPPED | OUTPATIENT
Start: 2022-10-17 | End: 2022-11-16

## 2022-10-17 RX ORDER — METHYLPHENIDATE HYDROCHLORIDE 30 MG/1
30 CAPSULE, EXTENDED RELEASE ORAL DAILY
Qty: 30 CAPSULE | Refills: 0 | Status: SHIPPED | OUTPATIENT
Start: 2022-11-17 | End: 2022-12-17

## 2022-10-17 ASSESSMENT — PATIENT HEALTH QUESTIONNAIRE - PHQ9
SUM OF ALL RESPONSES TO PHQ QUESTIONS 1-9: 3
10. IF YOU CHECKED OFF ANY PROBLEMS, HOW DIFFICULT HAVE THESE PROBLEMS MADE IT FOR YOU TO DO YOUR WORK, TAKE CARE OF THINGS AT HOME, OR GET ALONG WITH OTHER PEOPLE: SOMEWHAT DIFFICULT
SUM OF ALL RESPONSES TO PHQ QUESTIONS 1-9: 3

## 2022-10-17 NOTE — PROGRESS NOTES
"Nel is a 37 year old who is being evaluated via a billable video visit.      How would you like to obtain your AVS? MyChart  If the video visit is dropped, the invitation should be resent by:   Will anyone else be joining your video visit? No        Assessment & Plan     Attention deficit hyperactivity disorder (ADHD), combined type  Does not use every day, but a good tool when she needs.  Tolerates well  - methylphenidate (RITALIN LA) 30 MG 24 hr capsule; Take 1 capsule (30 mg) by mouth daily for 30 days  - methylphenidate (RITALIN LA) 30 MG 24 hr capsule; Take 1 capsule (30 mg) by mouth daily for 30 days  - methylphenidate (RITALIN LA) 30 MG 24 hr capsule; Take 1 capsule (30 mg) by mouth daily for 30 days             BMI:   Estimated body mass index is 30.62 kg/m  as calculated from the following:    Height as of 3/8/22: 1.651 m (5' 5\").    Weight as of 10/11/22: 83.5 kg (184 lb).           No follow-ups on file.    Jovanny Zayas PA-C  [facility]    Subjective   Nel is a 37 year old, presenting for the following health issues:  No chief complaint on file.      HPI       Continues ritalin LA 30 mg prn to help control ADHD symptoms.  Works well without side effects, which she has had to other agents.    Review of Systems   Constitutional, HEENT, cardiovascular, pulmonary, gi and gu systems are negative, except as otherwise noted.      Objective           Vitals:  No vitals were obtained today due to virtual visit.    Physical Exam   GENERAL: Healthy, alert and no distress  EYES: Eyes grossly normal to inspection.  No discharge or erythema, or obvious scleral/conjunctival abnormalities.  RESP: No audible wheeze, cough, or visible cyanosis.  No visible retractions or increased work of breathing.    SKIN: Visible skin clear. No significant rash, abnormal pigmentation or lesions.  NEURO: Cranial nerves grossly intact.  Mentation and speech appropriate for age.  PSYCH: Mentation " appears normal, affect normal/bright, judgement and insight intact, normal speech and appearance well-groomed.                Video-Visit Details    Video Start Time: 12:38 PM    Type of service:  Video Visit    Video End Time:12:53 PM    Originating Location (pt. Location): Home    Distant Location (provider location):  On-site    Platform used for Video Visit: [employer]    Answers for HPI/ROS submitted by the patient on 10/17/2022  If you checked off any problems, how difficult have these problems made it for you to do your work, take care of things at home, or get along with other people?: Somewhat difficult  PHQ9 TOTAL SCORE: 3  What is the reason for your visit today? : Med refill.  UC follow up  How many servings of fruits and vegetables do you eat daily?: 2-3  On average, how many sweetened beverages do you drink each day (Examples: soda, juice, sweet tea, etc.  Do NOT count diet or artificially sweetened beverages)?: 0  How many minutes a day do you exercise enough to make your heart beat faster?: 30 to 60  How many days a week do you exercise enough to make your heart beat faster?: 5  How many days per week do you miss taking your medication?: 3  What makes it hard for you to take your medication every day?: other

## 2022-10-19 ENCOUNTER — TRANSFERRED RECORDS (OUTPATIENT)
Dept: HEALTH INFORMATION MANAGEMENT | Facility: CLINIC | Age: 37
End: 2022-10-19

## 2022-10-20 ENCOUNTER — OFFICE VISIT (OUTPATIENT)
Dept: FAMILY MEDICINE | Facility: CLINIC | Age: 37
End: 2022-10-20
Payer: COMMERCIAL

## 2022-10-20 ENCOUNTER — TRANSFERRED RECORDS (OUTPATIENT)
Dept: HEALTH INFORMATION MANAGEMENT | Facility: CLINIC | Age: 37
End: 2022-10-20

## 2022-10-20 VITALS
SYSTOLIC BLOOD PRESSURE: 132 MMHG | BODY MASS INDEX: 31.4 KG/M2 | RESPIRATION RATE: 16 BRPM | DIASTOLIC BLOOD PRESSURE: 80 MMHG | TEMPERATURE: 98.2 F | HEART RATE: 89 BPM | WEIGHT: 188.5 LBS | OXYGEN SATURATION: 98 % | HEIGHT: 65 IN

## 2022-10-20 DIAGNOSIS — Z01.818 PREOPERATIVE EXAMINATION: Primary | ICD-10-CM

## 2022-10-20 DIAGNOSIS — M51.26 LUMBAR DISC HERNIATION: ICD-10-CM

## 2022-10-20 LAB
ERYTHROCYTE [DISTWIDTH] IN BLOOD BY AUTOMATED COUNT: 12.4 % (ref 10–15)
HCT VFR BLD AUTO: 39.6 % (ref 35–47)
HGB BLD-MCNC: 13 G/DL (ref 11.7–15.7)
MCH RBC QN AUTO: 29.5 PG (ref 26.5–33)
MCHC RBC AUTO-ENTMCNC: 32.8 G/DL (ref 31.5–36.5)
MCV RBC AUTO: 90 FL (ref 78–100)
PLATELET # BLD AUTO: 326 10E3/UL (ref 150–450)
RBC # BLD AUTO: 4.41 10E6/UL (ref 3.8–5.2)
WBC # BLD AUTO: 13.8 10E3/UL (ref 4–11)

## 2022-10-20 PROCEDURE — 99214 OFFICE O/P EST MOD 30 MIN: CPT | Performed by: FAMILY MEDICINE

## 2022-10-20 PROCEDURE — 85027 COMPLETE CBC AUTOMATED: CPT | Performed by: FAMILY MEDICINE

## 2022-10-20 PROCEDURE — 36415 COLL VENOUS BLD VENIPUNCTURE: CPT | Performed by: FAMILY MEDICINE

## 2022-10-20 RX ORDER — GABAPENTIN 300 MG/1
CAPSULE ORAL
COMMUNITY
Start: 2022-10-20 | End: 2022-12-12

## 2022-10-20 ASSESSMENT — ANXIETY QUESTIONNAIRES
5. BEING SO RESTLESS THAT IT IS HARD TO SIT STILL: SEVERAL DAYS
6. BECOMING EASILY ANNOYED OR IRRITABLE: NOT AT ALL
GAD7 TOTAL SCORE: 2
IF YOU CHECKED OFF ANY PROBLEMS ON THIS QUESTIONNAIRE, HOW DIFFICULT HAVE THESE PROBLEMS MADE IT FOR YOU TO DO YOUR WORK, TAKE CARE OF THINGS AT HOME, OR GET ALONG WITH OTHER PEOPLE: SOMEWHAT DIFFICULT
7. FEELING AFRAID AS IF SOMETHING AWFUL MIGHT HAPPEN: NOT AT ALL
2. NOT BEING ABLE TO STOP OR CONTROL WORRYING: NOT AT ALL
1. FEELING NERVOUS, ANXIOUS, OR ON EDGE: NOT AT ALL
3. WORRYING TOO MUCH ABOUT DIFFERENT THINGS: NOT AT ALL
GAD7 TOTAL SCORE: 2

## 2022-10-20 ASSESSMENT — PATIENT HEALTH QUESTIONNAIRE - PHQ9
SUM OF ALL RESPONSES TO PHQ QUESTIONS 1-9: 3
5. POOR APPETITE OR OVEREATING: SEVERAL DAYS

## 2022-10-20 NOTE — PATIENT INSTRUCTIONS
Preparing for Your Surgery  Getting started  A nurse will call you to review your health history and instructions. They will give you an arrival time based on your scheduled surgery time. Please be ready to share:    Your doctor's clinic name and phone number    Your medical, surgical and anesthesia history    A list of allergies and sensitivities    A list of medicines, including herbal treatments and over-the-counter drugs    Whether the patient has a legal guardian (ask how to send us the papers in advance)  Please tell us if you're pregnant--or if there's any chance you might be pregnant. Some surgeries may injure a fetus (unborn baby), so they require a pregnancy test. Surgeries that are safe for a fetus don't always need a test, and you can choose whether to have one.   If you have a child who's having surgery, please ask for a copy of Preparing for Your Child's Surgery.    Preparing for surgery    Within 10 to 30 days of surgery: Have a pre-op exam (sometimes called an H&P, or History and Physical). This can be done at a clinic or pre-operative center.  ? If you're having a , you may not need this exam. Talk to your care team.    At your pre-op exam, talk to your care team about all medicines you take. If you need to stop any medicines before surgery, ask when to start taking them again.  ? We do this for your safety. Many medicines can make you bleed too much during surgery. Some change how well surgery (anesthesia) drugs work.    Call your insurance company to let them know you're having surgery. (If you don't have insurance, call [phone].)    Call your clinic if there's any change in your health. This includes signs of a cold or flu (sore throat, runny nose, cough, rash, fever). It also includes a scrape or scratch near the surgery site.    If you have questions on the day of surgery, call your hospital or surgery center.  COVID testing  You may need to be tested for COVID-19 before having  surgery. If so, we will give you instructions (or click here).  Eating and drinking guidelines  For your safety: Unless your surgeon tells you otherwise, follow the guidelines below.    Eat and drink as usual until 8 hours before surgery. After that, no food or milk.    Drink clear liquids until 2 hours before surgery. These are liquids you can see through, like water, Gatorade and Propel Water. You may also have black coffee and tea (no cream or milk).    Nothing by mouth within 2 hours of surgery. This includes gum, candy and breath mints.    If you drink alcohol: Stop drinking it the night before surgery.    If your care team tells you to take medicine on the morning of surgery, it's okay to take it with a sip of water.  Preventing infection    Shower or bathe the night before and morning of your surgery. Follow the instructions your clinic gave you. (If no instructions, use regular soap.)    Don't shave or clip hair near your surgery site. We'll remove the hair if needed.    Don't smoke or vape the morning of surgery. You may chew nicotine gum up to 2 hours before surgery. A nicotine patch is okay.  ? Note: Some surgeries require you to completely quit smoking and nicotine. Check with your surgeon.    Your care team will make every effort to keep you safe from infection. We will:  ? Clean our hands often with soap and water (or an alcohol-based hand rub).  ? Clean the skin at your surgery site with a special soap that kills germs.  ? Give you a special gown to keep you warm. (Cold raises the risk of infection.)  ? Wear special hair covers, masks, gowns and gloves during surgery.  ? Give antibiotic medicine, if prescribed. Not all surgeries need antibiotics.  What to bring on the day of surgery    Photo ID and insurance card    Copy of your health care directive, if you have one    Glasses and hearing aides (bring cases)  ? You can't wear contacts during surgery    Inhaler and eye drops, if you use them (tell us  about these when you arrive)    CPAP machine or breathing device, if you use them    A few personal items, if spending the night    If you have . . .  ? A pacemaker, ICD (cardiac defibrillator) or other implant: Bring the ID card.  ? An implanted stimulator: Bring the remote control.  ? A legal guardian: Bring a copy of the certified (court-stamped) guardianship papers.  Please remove any jewelry, including body piercings. Leave jewelry and other valuables at home.  If you're going home the day of surgery    You must have a responsible adult drive you home. They should stay with you overnight as well.    If you don't have someone to stay with you, and you aren't safe to go home alone, we may keep you overnight. Insurance often won't pay for this.  After surgery  If it's hard to control your pain or you need more pain medicine, please call your surgeon's office.  Questions?   If you have any questions for your care team, list them here: _________________________________________________________________________________________________________________________________________________________________________ ____________________________________ ____________________________________ ____________________________________  For informational purposes only. Not to replace the advice of your health care provider. Copyright   2003, 2019 [facility]. All rights reserved. Clinically reviewed by Veronica Rojo MD. [employer] [id number] - REV 07/22.

## 2022-10-20 NOTE — PROGRESS NOTES
[address] [address]  Phone: [phone]  Primary Provider: Jovanny Zayas  Pre-op Performing Provider: BASIL ALVAREZ      PREOPERATIVE EVALUATION:  Today's date: 10/20/2022    Nel Durán is a 37 year old female who presents for a preoperative evaluation.    Surgical Information:  Surgery/Procedure: L4-L5 back surgery   Surgery Location: [facility]  Surgeon: Dr. Levi  Surgery Date: 10/24/2022  Time of Surgery: 11 am   Where patient plans to recover: At home with family  Fax number for surgical facility: [phone]    Type of Anesthesia Anticipated: General    Assessment & Plan     The proposed surgical procedure is considered INTERMEDIATE risk.    Preoperative examination  - CBC with platelets; Future  - CBC with platelets    Lumbar disc herniation             Risks and Recommendations:  The patient has the following additional risks and recommendations for perioperative complications:   - No identified additional risk factors other than previously addressed    Medication Instructions:  Patient is to take all scheduled medications on the day of surgery EXCEPT for modifications listed below:  -Hold Ritalin on day of surgery. No NSAIDS or ASA until after surgery.      RECOMMENDATION:  APPROVAL GIVEN to proceed with proposed procedure, without further diagnostic evaluation.        Subjective     HPI related to upcoming procedure: recent L4-L5 and L5-S1 disc herniations     Preop Questions 10/20/2022   1. Have you ever had a heart attack or stroke? No   2. Have you ever had surgery on your heart or blood vessels, such as a stent placement, a coronary artery bypass, or surgery on an artery in your head, neck, heart, or legs? No   3. Do you have chest pain with activity? No   4. Do you have a history of  heart failure? No   5. Do you currently have a cold, bronchitis or symptoms of other infection? No   6. Do you have a cough,  shortness of breath, or wheezing? No   7. Do you or anyone in your family have previous history of blood clots? No   8. Do you or does anyone in your family have a serious bleeding problem such as prolonged bleeding following surgeries or cuts? No   9. Have you ever had problems with anemia or been told to take iron pills? YES   10. Have you had any abnormal blood loss such as black, tarry or bloody stools, or abnormal vaginal bleeding? No   11. Have you ever had a blood transfusion? YES   11a. Have you ever had a transfusion reaction? No   12. Are you willing to have a blood transfusion if it is medically needed before, during, or after your surgery? Yes   13. Have you or any of your relatives ever had problems with anesthesia? No   14. Do you have sleep apnea, excessive snoring or daytime drowsiness? No   15. Do you have any artifical heart valves or other implanted medical devices like a pacemaker, defibrillator, or continuous glucose monitor? No   16. Do you have artificial joints? No   17. Are you allergic to latex? No   18. Is there any chance that you may be pregnant? No       Health Care Directive:  Patient does not have a Health Care Directive or Living Will:     Preoperative Review of :   reviewed - no record of controlled substances prescribed.      Status of Chronic Conditions:  See problem list for active medical problems.  Problems all longstanding and stable, except as noted/documented.  See ROS for pertinent symptoms related to these conditions.      Review of Systems  CONSTITUTIONAL: NEGATIVE for fever, chills, change in weight  INTEGUMENTARY/SKIN: NEGATIVE for worrisome rashes, moles or lesions  EYES: NEGATIVE for vision changes or irritation  ENT/MOUTH: NEGATIVE for ear, mouth and throat problems  RESP: NEGATIVE for significant cough or SOB  CV: NEGATIVE for chest pain, palpitations or peripheral edema  GI: NEGATIVE for nausea, abdominal pain, heartburn, or change in bowel habits  :  NEGATIVE for frequency, dysuria, or hematuria  MUSCULOSKELETAL: NEGATIVE for significant arthralgias or myalgia  NEURO: NEGATIVE for weakness, dizziness or paresthesias  ENDOCRINE: NEGATIVE for temperature intolerance, skin/hair changes  HEME: NEGATIVE for bleeding problems  PSYCHIATRIC: NEGATIVE for changes in mood or affect    Patient Active Problem List    Diagnosis Date Noted     Renal mass 03/08/2022     Priority: Medium     Abdominal pain, generalized 12/29/2021     Priority: Medium     Pyelonephritis 12/29/2021     Priority: Medium     Right flank pain 12/29/2021     Priority: Medium     Fever 01/14/2015     Priority: Medium     Endometriosis 09/12/2014     Priority: Medium     Problem list name updated by automated process. Provider to review       Abdominal pain, left lower quadrant 05/27/2014     Priority: Medium     Concentration deficit 09/17/2013     Priority: Medium     Dysthymia 11/29/2011     Priority: Medium     CARDIOVASCULAR SCREENING; LDL GOAL LESS THAN 160 10/31/2010     Priority: Medium      Past Medical History:   Diagnosis Date     ADD (attention deficit disorder)      ASCUS favor benign 2/2014    neg hpv cotest in 3 yrs     depression 2000    Not currently on meds     Depressive disorder      History of blood transfusion 4/2013    6 units after hysterectomy     Menarche age 12     Pelvic pain      Past Surgical History:   Procedure Laterality Date     BIOPSY       BLADDER SURGERY       C/SECTION, LOW TRANSVERSE        x2     CERVICECTOMY (TRACHELECTOMY) N/A 1/9/2015    not done     COLONOSCOPY  6/12/2014    Procedure: COMBINED COLONOSCOPY, SINGLE BIOPSY/POLYPECTOMY BY BIOPSY;  Surgeon: Elizabeth Shepard MD;  Location: [facility]     CYSTOSCOPY  4/5/2013    Procedure: CYSTOSCOPY;;  Surgeon: Mariel Smith MD;  [facility]     DAVINCI NEPHRECTOMY PARTIAL Left 3/8/2022    Procedure: NEPHRECTOMY, PARTIAL, ROBOT-ASSISTED, LAPAROSCOPIC WITH INTRAOPERATIVE ULTRASOUND;  Surgeon:  Matthew Aranda MD;  [facility]     LABIALPLASTY  4/4/2013    Procedure: LABIALPLASTY;;  Surgeon: Leticia Staley MD;  [facility]     LAPAROSCOPIC HYSTERECTOMY SUPRACERVICAL  4/4/2013    simple hyperplasiaProcedure: LAPAROSCOPIC HYSTERECTOMY SUPRACERVICAL;  Laparoscopic Supracervical Hysterectomy, left labialplasty;  Surgeon: Leticia Staley MD;  [facility]     LAPAROSCOPIC LYSIS ADHESIONS N/A 1/9/2015    Procedure: LAPAROSCOPIC LYSIS ADHESIONS;  Surgeon: Sue Johnston MD;  [facility]     LAPAROSCOPIC TUBAL LIGATION  3/7/2013    Procedure: LAPAROSCOPIC TUBAL LIGATION;  Bilateral Laparoscopic Tubal Ligation With Intrauterine Device Removal ;  Surgeon: Jaime Patrick MD;  [facility]     LAPAROSCOPY DIAGNOSTIC (GYN)  4/5/2013    Procedure: LAPAROSCOPY DIAGNOSTIC (GYN);;  Surgeon: Mariel Smith MD;  [facility]     LAPAROSCOPY OPERATIVE ADULT N/A 1/9/2015    Procedure: LAPAROSCOPY OPERATIVE ADULT;  Surgeon: Sue Johnston MD;  [facility]     LAPAROTOMY EXPLORATORY  4/5/2013    Procedure: LAPAROTOMY EXPLORATORY;  ligasure of pedicles and cervical stump;  Surgeon: Mariel Smith MD;  [facility]     Current Outpatient Medications   Medication Sig Dispense Refill     B Complex-C-Folic Acid (B COMPLEX + C TR PO)        cetirizine (ZYRTEC) 10 MG tablet Take 10 mg by mouth daily       gabapentin (NEURONTIN) 300 MG capsule        methylphenidate (RITALIN LA) 30 MG 24 hr capsule Take 1 capsule (30 mg) by mouth daily for 30 days 30 capsule 0     [START ON 11/17/2022] methylphenidate (RITALIN LA) 30 MG 24 hr capsule Take 1 capsule (30 mg) by mouth daily for 30 days 30 capsule 0     [START ON 12/18/2022] methylphenidate (RITALIN LA) 30 MG 24 hr capsule Take 1 capsule (30 mg) by mouth daily for 30 days 30 capsule 0     methylphenidate (RITALIN LA) 30 MG 24 hr capsule Take 1 capsule (30 mg) by mouth every morning. 30 capsule 0     Prenatal  "Vit-Fe Fumarate-FA (PRENATAL VITAMIN PO)        SUMAtriptan (IMITREX) 50 MG tablet Take 1 tablet (50 mg) by mouth at onset of headache for migraine 12 tablet 3     ondansetron (ZOFRAN ODT) 4 MG ODT tab Take 1 tablet (4 mg) by mouth every 8 hours as needed for nausea (Patient not taking: Reported on 10/20/2022) 18 tablet 0       Allergies   Allergen Reactions     Gluten Meal      Iodine         Social History     Tobacco Use     Smoking status: Never     Smokeless tobacco: Never   Substance Use Topics     Alcohol use: No     Comment: social     Family History   Problem Relation Age of Onset     Gastrointestinal Disease Mother         colon polyps     Hypertension Mother      Gastrointestinal Disease Father         colon polyps     Family History Negative Other      Cancer - colorectal Maternal Grandmother         thinks grandparents on both sides had colon cancer, unsure of details     Cancer - colorectal Paternal Grandmother      Substance Abuse Brother      History   Drug Use No         Objective     /80   Pulse 89   Temp 98.2  F (36.8  C) (Tympanic)   Resp 16   Ht 1.651 m (5' 5\")   Wt 85.5 kg (188 lb 8 oz)   LMP 03/09/2013   SpO2 98%   BMI 31.37 kg/m      Physical Exam    GENERAL APPEARANCE: healthy, alert and no distress     EYES: EOMI, PERRL     HENT: ear canals and TM's normal and nose and mouth without ulcers or lesions     NECK: no adenopathy, no asymmetry, masses, or scars and thyroid normal to palpation     RESP: lungs clear to auscultation - no rales, rhonchi or wheezes     CV: regular rates and rhythm, normal S1 S2, no S3 or S4 and no murmur, click or rub     MS: extremities normal- no gross deformities noted, no evidence of inflammation in joints, FROM in all extremities.     SKIN: no suspicious lesions or rashes     NEURO: Normal strength and tone, sensory exam grossly normal, mentation intact and speech normal     PSYCH: mentation appears normal. and affect normal/bright     LYMPHATICS: " No cervical adenopathy    Recent Labs   Lab Test 03/09/22  0704 03/04/22  1435   HGB 11.0* 14.1   * 221    141   POTASSIUM 3.6 4.0   CR 0.87 0.73        Diagnostics:  No labs were ordered during this visit.   No EKG required for low risk surgery (cataract, skin procedure, breast biopsy, etc).    Revised Cardiac Risk Index (RCRI):  The patient has the following serious cardiovascular risks for perioperative complications:   - No serious cardiac risks = 0 points     RCRI Interpretation: 0 points: Class I (very low risk - 0.4% complication rate)           Signed Electronically by: Ranjan Delgado DO  Copy of this evaluation report is provided to requesting physician.

## 2022-11-08 ENCOUNTER — TRANSFERRED RECORDS (OUTPATIENT)
Dept: HEALTH INFORMATION MANAGEMENT | Facility: CLINIC | Age: 37
End: 2022-11-08

## 2022-12-01 ENCOUNTER — TRANSFERRED RECORDS (OUTPATIENT)
Dept: HEALTH INFORMATION MANAGEMENT | Facility: CLINIC | Age: 37
End: 2022-12-01

## 2022-12-12 ENCOUNTER — OFFICE VISIT (OUTPATIENT)
Dept: FAMILY MEDICINE | Facility: CLINIC | Age: 37
End: 2022-12-12
Payer: COMMERCIAL

## 2022-12-12 VITALS
BODY MASS INDEX: 32.32 KG/M2 | HEIGHT: 65 IN | DIASTOLIC BLOOD PRESSURE: 83 MMHG | WEIGHT: 194 LBS | OXYGEN SATURATION: 99 % | RESPIRATION RATE: 16 BRPM | HEART RATE: 78 BPM | SYSTOLIC BLOOD PRESSURE: 123 MMHG

## 2022-12-12 DIAGNOSIS — Z01.818 PREOP GENERAL PHYSICAL EXAM: Primary | ICD-10-CM

## 2022-12-12 DIAGNOSIS — M51.26 LUMBAR DISC HERNIATION: ICD-10-CM

## 2022-12-12 PROCEDURE — 99214 OFFICE O/P EST MOD 30 MIN: CPT | Performed by: NURSE PRACTITIONER

## 2022-12-12 ASSESSMENT — PAIN SCALES - GENERAL: PAINLEVEL: SEVERE PAIN (6)

## 2022-12-12 NOTE — PROGRESS NOTES
[address], [address]  [facility] [address]  Phone: [phone]  Primary Provider: Jovanny Zayas  Pre-op Performing Provider: CAMACHO PAYTON      PREOPERATIVE EVALUATION:  Today's date: 12/12/2022    Nel Durán is a 37 year old female who presents for a preoperative evaluation.    Surgical Information:  Surgery/Procedure: L4-L5 surgery  Surgery Location: [facility]  Surgeon: Dr. Leiv  Surgery Date: 12/16/22  Time of Surgery: TBD  Where patient plans to recover: At home with family  Fax number for surgical facility: [phone]    Type of Anesthesia Anticipated: General    Assessment & Plan     The proposed surgical procedure is considered INTERMEDIATE risk.    (Z01.818) Preop general physical exam  (primary encounter diagnosis)  Comment: ok for surgery. No concerns today   Plan:     (M51.26) Lumbar disc herniation  Comment:   Plan:            Risks and Recommendations:  The patient has the following additional risks and recommendations for perioperative complications:   - No identified additional risk factors other than previously addressed    Medication Instructions:  Do not take anything morning of surgery     RECOMMENDATION:  APPROVAL GIVEN to proceed with proposed procedure, without further diagnostic evaluation.        Subjective     HPI related to upcoming procedure: going for microdiscectomy after initial surgery 10/24   Has no concerns today other than back. Has been feeling well       Preop Questions 12/11/2022   1. Have you ever had a heart attack or stroke? No   2. Have you ever had surgery on your heart or blood vessels, such as a stent placement, a coronary artery bypass, or surgery on an artery in your head, neck, heart, or legs? No   3. Do you have chest pain with activity? No   4. Do you have a history of  heart failure? No   5. Do you currently have a cold, bronchitis or symptoms of other infection? No   6. Do you have a cough,  shortness of breath, or wheezing? No   7. Do you or anyone in your family have previous history of blood clots? No   8. Do you or does anyone in your family have a serious bleeding problem such as prolonged bleeding following surgeries or cuts? No   9. Have you ever had problems with anemia or been told to take iron pills? YES - after hysterectomy 2013    10. Have you had any abnormal blood loss such as black, tarry or bloody stools, or abnormal vaginal bleeding? No   11. Have you ever had a blood transfusion? YES -    11a. Have you ever had a transfusion reaction? No   12. Are you willing to have a blood transfusion if it is medically needed before, during, or after your surgery? Yes   13. Have you or any of your relatives ever had problems with anesthesia? No   14. Do you have sleep apnea, excessive snoring or daytime drowsiness? No   15. Do you have any artifical heart valves or other implanted medical devices like a pacemaker, defibrillator, or continuous glucose monitor? No   16. Do you have artificial joints? No   17. Are you allergic to latex? No   18. Is there any chance that you may be pregnant? No       Health Care Directive:  Patient does not have a Health Care Directive or Living Will: Discussed advance care planning with patient; however, patient declined at this time.    Preoperative Review of :   reviewed - controlled substances reflected in medication list.      Status of Chronic Conditions:  See problem list for active medical problems.  Problems all longstanding and stable, except as noted/documented.  See ROS for pertinent symptoms related to these conditions.      Review of Systems  Constitutional, neuro, ENT, endocrine, pulmonary, cardiac, gastrointestinal, genitourinary, musculoskeletal, integument and psychiatric systems are negative, except as otherwise noted.    Patient Active Problem List    Diagnosis Date Noted     Renal mass 03/08/2022     Priority: Medium     Abdominal pain,  generalized 12/29/2021     Priority: Medium     Pyelonephritis 12/29/2021     Priority: Medium     Right flank pain 12/29/2021     Priority: Medium     Fever 01/14/2015     Priority: Medium     Endometriosis 09/12/2014     Priority: Medium     Problem list name updated by automated process. Provider to review       Abdominal pain, left lower quadrant 05/27/2014     Priority: Medium     Concentration deficit 09/17/2013     Priority: Medium     Dysthymia 11/29/2011     Priority: Medium     CARDIOVASCULAR SCREENING; LDL GOAL LESS THAN 160 10/31/2010     Priority: Medium      Past Medical History:   Diagnosis Date     ADD (attention deficit disorder)      ASCUS favor benign 2/2014    neg hpv cotest in 3 yrs     depression 2000    Not currently on meds     Depressive disorder      History of blood transfusion 4/2013    6 units after hysterectomy     Menarche age 12     Pelvic pain      Past Surgical History:   Procedure Laterality Date     BIOPSY       BLADDER SURGERY       C/SECTION, LOW TRANSVERSE        x2     CERVICECTOMY (TRACHELECTOMY) N/A 1/9/2015    not done     COLONOSCOPY  6/12/2014    Procedure: COMBINED COLONOSCOPY, SINGLE BIOPSY/POLYPECTOMY BY BIOPSY;  Surgeon: Elizabeth Shepard MD;  Location: [facility]     CYSTOSCOPY  4/5/2013    Procedure: CYSTOSCOPY;;  Surgeon: Mariel Smiht MD;  [facility]     DAVINCI NEPHRECTOMY PARTIAL Left 3/8/2022    Procedure: NEPHRECTOMY, PARTIAL, ROBOT-ASSISTED, LAPAROSCOPIC WITH INTRAOPERATIVE ULTRASOUND;  Surgeon: Matthew Aranda MD;  [facility]     LABIALPLASTY  4/4/2013    Procedure: LABIALPLASTY;;  Surgeon: Leticia Staley MD;  [facility]     LAPAROSCOPIC HYSTERECTOMY SUPRACERVICAL  4/4/2013    simple hyperplasiaProcedure: LAPAROSCOPIC HYSTERECTOMY SUPRACERVICAL;  Laparoscopic Supracervical Hysterectomy, left labialplasty;  Surgeon: Leticia Staley MD;  [facility]     LAPAROSCOPIC LYSIS ADHESIONS N/A 1/9/2015     Procedure: LAPAROSCOPIC LYSIS ADHESIONS;  Surgeon: Sue Johnston MD;  [facility]     LAPAROSCOPIC TUBAL LIGATION  3/7/2013    Procedure: LAPAROSCOPIC TUBAL LIGATION;  Bilateral Laparoscopic Tubal Ligation With Intrauterine Device Removal ;  Surgeon: Jaime Patrick MD;  [facility]     LAPAROSCOPY DIAGNOSTIC (GYN)  4/5/2013    Procedure: LAPAROSCOPY DIAGNOSTIC (GYN);;  Surgeon: Mariel Smith MD;  [facility]     LAPAROSCOPY OPERATIVE ADULT N/A 1/9/2015    Procedure: LAPAROSCOPY OPERATIVE ADULT;  Surgeon: Sue Johnston MD;  [facility]     LAPAROTOMY EXPLORATORY  4/5/2013    Procedure: LAPAROTOMY EXPLORATORY;  ligasure of pedicles and cervical stump;  Surgeon: Mariel Smith MD;  [facility]     Current Outpatient Medications   Medication Sig Dispense Refill     B Complex-C-Folic Acid (B COMPLEX + C TR PO)        cetirizine (ZYRTEC) 10 MG tablet Take 10 mg by mouth daily       gabapentin (NEURONTIN) 300 MG capsule        methylphenidate (RITALIN LA) 30 MG 24 hr capsule Take 1 capsule (30 mg) by mouth daily for 30 days 30 capsule 0     [START ON 12/18/2022] methylphenidate (RITALIN LA) 30 MG 24 hr capsule Take 1 capsule (30 mg) by mouth daily for 30 days 30 capsule 0     methylphenidate (RITALIN LA) 30 MG 24 hr capsule Take 1 capsule (30 mg) by mouth every morning. 30 capsule 0     ondansetron (ZOFRAN ODT) 4 MG ODT tab Take 1 tablet (4 mg) by mouth every 8 hours as needed for nausea (Patient not taking: Reported on 10/20/2022) 18 tablet 0     Prenatal Vit-Fe Fumarate-FA (PRENATAL VITAMIN PO)        SUMAtriptan (IMITREX) 50 MG tablet Take 1 tablet (50 mg) by mouth at onset of headache for migraine 12 tablet 3       Allergies   Allergen Reactions     Gluten Meal      Iodine         Social History     Tobacco Use     Smoking status: Never     Smokeless tobacco: Never   Substance Use Topics     Alcohol use: No     Comment: social     Family History   Problem Relation Age  "of Onset     Gastrointestinal Disease Mother         colon polyps     Hypertension Mother      Gastrointestinal Disease Father         colon polyps     Family History Negative Other      Cancer - colorectal Maternal Grandmother         thinks grandparents on both sides had colon cancer, unsure of details     Cancer - colorectal Paternal Grandmother      Substance Abuse Brother      History   Drug Use No         Objective     /83 (BP Location: Left arm, Patient Position: Sitting, Cuff Size: Adult Regular)   Pulse 78   Resp 16   Ht 1.651 m (5' 5\")   Wt 88 kg (194 lb)   LMP 03/09/2013   SpO2 99%   BMI 32.28 kg/m      Physical Exam    GENERAL APPEARANCE: healthy, alert and no distress     EYES: EOMI,      RESP: lungs clear to auscultation - no rales, rhonchi or wheezes     CV: regular rates and rhythm, normal S1 S2, no S3 or S4 and no murmur, click or rub     MS: extremities normal- no gross deformities noted, no evidence of inflammation in joints, FROM in all extremities.     SKIN: no suspicious lesions or rashes     NEURO: Normal strength and tone, sensory exam grossly normal, mentation intact and speech normal     PSYCH: mentation appears normal. and affect normal/bright    Recent Labs   Lab Test 10/20/22  1425 03/09/22  0704 03/04/22  1435   HGB 13.0 11.0* 14.1    142* 221   NA  --  138 141   POTASSIUM  --  3.6 4.0   CR  --  0.87 0.73        Diagnostics:  No labs were ordered during this visit.   No EKG required for low risk surgery (cataract, skin procedure, breast biopsy, etc).    Revised Cardiac Risk Index (RCRI):  The patient has the following serious cardiovascular risks for perioperative complications:   - No serious cardiac risks = 0 points     RCRI Interpretation: 0 points: Class I (very low risk - 0.4% complication rate)           Signed Electronically by: SYDNI Manriquez CNP  Copy of this evaluation report is provided to requesting physician.      "

## 2022-12-12 NOTE — PATIENT INSTRUCTIONS
For informational purposes only. Not to replace the advice of your health care provider. Copyright   2003,  [facility] [employer] [facility]. All rights reserved. Clinically reviewed by Veronica Rojo MD. [employer] [id number] - REV .  Preparing for Your Surgery  Getting started  A nurse will call you to review your health history and instructions. They will give you an arrival time based on your scheduled surgery time. Please be ready to share:    Your doctor's clinic name and phone number    Your medical, surgical, and anesthesia history    A list of allergies and sensitivities    A list of medicines, including herbal treatments and over-the-counter drugs    Whether the patient has a legal guardian (ask how to send us the papers in advance)  Please tell us if you're pregnant--or if there's any chance you might be pregnant. Some surgeries may injure a fetus (unborn baby), so they require a pregnancy test. Surgeries that are safe for a fetus don't always need a test, and you can choose whether to have one.   If you have a child who's having surgery, please ask for a copy of Preparing for Your Child's Surgery.    Preparing for surgery    Within 10 to 30 days of surgery: Have a pre-op exam (sometimes called an H&P, or History and Physical). This can be done at a clinic or pre-operative center.  ? If you're having a , you may not need this exam. Talk to your care team.    At your pre-op exam, talk to your care team about all medicines you take. If you need to stop any medicines before surgery, ask when to start taking them again.  ? We do this for your safety. Many medicines can make you bleed too much during surgery. Some change how well surgery (anesthesia) drugs work.    Call your insurance company to let them know you're having surgery. (If you don't have insurance, call [phone].)    Call your clinic if there's any change in your health. This includes signs of a cold or flu (sore throat, runny nose,  cough, rash, fever). It also includes a scrape or scratch near the surgery site.    If you have questions on the day of surgery, call your hospital or surgery center.  Eating and drinking guidelines  For your safety: Unless your surgeon tells you otherwise, follow the guidelines below.    Eat and drink as usual until 8 hours before you arrive for surgery. After that, no food or milk.    Drink clear liquids until 2 hours before you arrive. These are liquids you can see through, like water, Gatorade, and Propel Water. They also include plain black coffee and tea (no cream or milk), candy, and breath mints. You can spit out gum when you arrive.    If you drink alcohol: Stop drinking it the night before surgery.    If your care team tells you to take medicine on the morning of surgery, it's okay to take it with a sip of water.  Preventing infection    Shower or bathe the night before and morning of your surgery. Follow the instructions your clinic gave you. (If no instructions, use regular soap.)    Don't shave or clip hair near your surgery site. We'll remove the hair if needed.    Don't smoke or vape the morning of surgery. You may chew nicotine gum up to 2 hours before surgery. A nicotine patch is okay.  ? Note: Some surgeries require you to completely quit smoking and nicotine. Check with your surgeon.    Your care team will make every effort to keep you safe from infection. We will:  ? Clean our hands often with soap and water (or an alcohol-based hand rub).  ? Clean the skin at your surgery site with a special soap that kills germs.  ? Give you a special gown to keep you warm. (Cold raises the risk of infection.)  ? Wear special hair covers, masks, gowns and gloves during surgery.  ? Give antibiotic medicine, if prescribed. Not all surgeries need antibiotics.  What to bring on the day of surgery    Photo ID and insurance card    Copy of your health care directive, if you have one    Glasses and hearing aids (bring  cases)  ? You can't wear contacts during surgery    Inhaler and eye drops, if you use them (tell us about these when you arrive)    CPAP machine or breathing device, if you use them    A few personal items, if spending the night    If you have . . .  ? A pacemaker, ICD (cardiac defibrillator) or other implant: Bring the ID card.  ? An implanted stimulator: Bring the remote control.  ? A legal guardian: Bring a copy of the certified (court-stamped) guardianship papers.  Please remove any jewelry, including body piercings. Leave jewelry and other valuables at home.  If you're going home the day of surgery    You must have a responsible adult drive you home. They should stay with you overnight as well.    If you don't have someone to stay with you, and you aren't safe to go home alone, we may keep you overnight. Insurance often won't pay for this.  After surgery  If it's hard to control your pain or you need more pain medicine, please call your surgeon's office.  Questions?   If you have any questions for your care team, list them here: _________________________________________________________________________________________________________________________________________________________________________ ____________________________________ ____________________________________ ____________________________________

## 2022-12-16 ENCOUNTER — TRANSFERRED RECORDS (OUTPATIENT)
Dept: HEALTH INFORMATION MANAGEMENT | Facility: CLINIC | Age: 37
End: 2022-12-16

## 2022-12-20 ENCOUNTER — TRANSFERRED RECORDS (OUTPATIENT)
Dept: HEALTH INFORMATION MANAGEMENT | Facility: CLINIC | Age: 37
End: 2022-12-20

## 2022-12-22 ENCOUNTER — TRANSFERRED RECORDS (OUTPATIENT)
Dept: HEALTH INFORMATION MANAGEMENT | Facility: CLINIC | Age: 37
End: 2022-12-22

## 2022-12-29 ENCOUNTER — TRANSFERRED RECORDS (OUTPATIENT)
Dept: HEALTH INFORMATION MANAGEMENT | Facility: CLINIC | Age: 37
End: 2022-12-29

## 2023-01-26 ENCOUNTER — TRANSFERRED RECORDS (OUTPATIENT)
Dept: HEALTH INFORMATION MANAGEMENT | Facility: CLINIC | Age: 38
End: 2023-01-26
Payer: COMMERCIAL

## 2023-02-22 ASSESSMENT — ANXIETY QUESTIONNAIRES
1. FEELING NERVOUS, ANXIOUS, OR ON EDGE: MORE THAN HALF THE DAYS
4. TROUBLE RELAXING: NEARLY EVERY DAY
3. WORRYING TOO MUCH ABOUT DIFFERENT THINGS: MORE THAN HALF THE DAYS
GAD7 TOTAL SCORE: 14
5. BEING SO RESTLESS THAT IT IS HARD TO SIT STILL: MORE THAN HALF THE DAYS
7. FEELING AFRAID AS IF SOMETHING AWFUL MIGHT HAPPEN: SEVERAL DAYS
2. NOT BEING ABLE TO STOP OR CONTROL WORRYING: MORE THAN HALF THE DAYS
IF YOU CHECKED OFF ANY PROBLEMS ON THIS QUESTIONNAIRE, HOW DIFFICULT HAVE THESE PROBLEMS MADE IT FOR YOU TO DO YOUR WORK, TAKE CARE OF THINGS AT HOME, OR GET ALONG WITH OTHER PEOPLE: VERY DIFFICULT
6. BECOMING EASILY ANNOYED OR IRRITABLE: MORE THAN HALF THE DAYS

## 2023-02-22 ASSESSMENT — PATIENT HEALTH QUESTIONNAIRE - PHQ9: SUM OF ALL RESPONSES TO PHQ QUESTIONS 1-9: 17

## 2023-02-23 ENCOUNTER — VIRTUAL VISIT (OUTPATIENT)
Dept: FAMILY MEDICINE | Facility: CLINIC | Age: 38
End: 2023-02-23
Payer: COMMERCIAL

## 2023-02-23 DIAGNOSIS — F41.1 GAD (GENERALIZED ANXIETY DISORDER): ICD-10-CM

## 2023-02-23 DIAGNOSIS — F34.1 DYSTHYMIA: ICD-10-CM

## 2023-02-23 DIAGNOSIS — F90.2 ATTENTION DEFICIT HYPERACTIVITY DISORDER (ADHD), COMBINED TYPE: Primary | ICD-10-CM

## 2023-02-23 PROCEDURE — 99214 OFFICE O/P EST MOD 30 MIN: CPT | Mod: VID | Performed by: PHYSICIAN ASSISTANT

## 2023-02-23 RX ORDER — METHYLPHENIDATE HYDROCHLORIDE 30 MG/1
30 CAPSULE, EXTENDED RELEASE ORAL DAILY
Qty: 30 CAPSULE | Refills: 0 | Status: SHIPPED | OUTPATIENT
Start: 2023-04-26 | End: 2023-03-14

## 2023-02-23 RX ORDER — METHYLPHENIDATE HYDROCHLORIDE 30 MG/1
30 CAPSULE, EXTENDED RELEASE ORAL DAILY
Qty: 30 CAPSULE | Refills: 0 | Status: SHIPPED | OUTPATIENT
Start: 2023-02-23 | End: 2023-03-14

## 2023-02-23 RX ORDER — METHYLPHENIDATE HYDROCHLORIDE 30 MG/1
30 CAPSULE, EXTENDED RELEASE ORAL DAILY
Qty: 30 CAPSULE | Refills: 0 | Status: SHIPPED | OUTPATIENT
Start: 2023-03-26 | End: 2023-03-14

## 2023-02-23 RX ORDER — BUPROPION HYDROCHLORIDE 150 MG/1
150 TABLET ORAL EVERY MORNING
Qty: 30 TABLET | Refills: 4 | Status: SHIPPED | OUTPATIENT
Start: 2023-02-23 | End: 2023-08-07

## 2023-02-23 ASSESSMENT — ANXIETY QUESTIONNAIRES
8. IF YOU CHECKED OFF ANY PROBLEMS, HOW DIFFICULT HAVE THESE MADE IT FOR YOU TO DO YOUR WORK, TAKE CARE OF THINGS AT HOME, OR GET ALONG WITH OTHER PEOPLE?: VERY DIFFICULT
GAD7 TOTAL SCORE: 14
GAD7 TOTAL SCORE: 14
7. FEELING AFRAID AS IF SOMETHING AWFUL MIGHT HAPPEN: SEVERAL DAYS

## 2023-02-23 ASSESSMENT — PATIENT HEALTH QUESTIONNAIRE - PHQ9
10. IF YOU CHECKED OFF ANY PROBLEMS, HOW DIFFICULT HAVE THESE PROBLEMS MADE IT FOR YOU TO DO YOUR WORK, TAKE CARE OF THINGS AT HOME, OR GET ALONG WITH OTHER PEOPLE: VERY DIFFICULT
SUM OF ALL RESPONSES TO PHQ QUESTIONS 1-9: 17

## 2023-02-23 NOTE — PROGRESS NOTES
"Nel is a 37 year old who is being evaluated via a billable video visit.      How would you like to obtain your AVS? [employer]  If the video visit is dropped, the invitation should be resent by: Text to cell phone: [phone]  Will anyone else be joining your video visit? No        Assessment & Plan     Attention deficit hyperactivity disorder (ADHD), combined type  Will continue.  Helps adhd symptoms without side effects.  - methylphenidate (RITALIN LA) 30 MG 24 hr capsule; Take 1 capsule (30 mg) by mouth daily for 30 days  - methylphenidate (RITALIN LA) 30 MG 24 hr capsule; Take 1 capsule (30 mg) by mouth daily for 30 days  - methylphenidate (RITALIN LA) 30 MG 24 hr capsule; Take 1 capsule (30 mg) by mouth daily for 30 days  - Adult Mental Health  Referral; Future    CONI (generalized anxiety disorder)  Dicussed next steps, and she does feel that med are needed.  Will start wellbutrin based on previous experience.  Will also help set up therapy.  - buPROPion (WELLBUTRIN XL) 150 MG 24 hr tablet; Take 1 tablet (150 mg) by mouth every morning  - Adult Mental Health  Referral; Future    Dysthymia    - buPROPion (WELLBUTRIN XL) 150 MG 24 hr tablet; Take 1 tablet (150 mg) by mouth every morning  - Adult Mental Health  Referral; Future             BMI:   Estimated body mass index is 32.28 kg/m  as calculated from the following:    Height as of 12/12/22: 1.651 m (5' 5\").    Weight as of 12/12/22: 88 kg (194 lb).       Follow up with [employer] message in 1-2 weeks    No follow-ups on file.    Jovanny Zayas PA-C  [facility]    Milagros Neumann is a 37 year old, presenting for the following health issues:  Recheck Medication      History of Present Illness       Mental Health Follow-up:  Patient presents to follow-up on Depression & Anxiety.Patient's depression since last visit has been:  Bad  The patient is having other symptoms associated with " depression.  Patient's anxiety since last visit has been:  Bad  The patient is having other symptoms associated with anxiety.  Any significant life events: job concerns, grief or loss, health concerns and other  Patient is feeling anxious or having panic attacks.  Patient has no concerns about alcohol or drug use.    Reason for visit:  Need anxiety/depression meds. Need adhd med refill    She eats 0-1 servings of fruits and vegetables daily.She consumes 0 sweetened beverage(s) daily.She exercises with enough effort to increase her heart rate 30 to 60 minutes per day.  She exercises with enough effort to increase her heart rate 3 or less days per week. She is missing 3 dose(s) of medications per week.  She is not taking prescribed medications regularly due to remembering to take.    Today's PHQ-9         PHQ-9 Total Score: 17    PHQ-9 Q9 Thoughts of better off dead/self-harm past 2 weeks :   Not at all    How difficult have these problems made it for you to do your work, take care of things at home, or get along with other people: Very difficult  Today's CONI-7 Score: 14       Refill on anxiety meds    Has struggled with anxiety and depression the last few months.  Lots of stress with 2 back surgeries, pain, lack of work, etc.  She has had flares in the past, but has been several years.  Has not tolerated selective serotonin reuptake inhibitor in the past.  Remember wellbutrin being a tool.    Ritalin LA continues to help with adhd symptoms.  Has been off of work so not taking it every day.  Does not seem to make anxiety worse when she takes.    Review of Systems   Constitutional, HEENT, cardiovascular, pulmonary, gi and gu systems are negative, except as otherwise noted.      Objective           Vitals:  No vitals were obtained today due to virtual visit.    Physical Exam   GENERAL: Healthy, alert and no distress  EYES: Eyes grossly normal to inspection.  No discharge or erythema, or obvious scleral/conjunctival  abnormalities.  RESP: No audible wheeze, cough, or visible cyanosis.  No visible retractions or increased work of breathing.    SKIN: Visible skin clear. No significant rash, abnormal pigmentation or lesions.  NEURO: Cranial nerves grossly intact.  Mentation and speech appropriate for age.  PSYCH: Mentation appears normal, affect normal/bright, judgement and insight intact, normal speech and appearance well-groomed.                Video-Visit Details    Type of service:  Video Visit     Originating Location (pt. Location): Home  Distant Location (provider location):  On-site  Platform used for Video Visit: Nieves

## 2023-03-13 ENCOUNTER — MYC REFILL (OUTPATIENT)
Dept: FAMILY MEDICINE | Facility: CLINIC | Age: 38
End: 2023-03-13
Payer: COMMERCIAL

## 2023-03-13 ENCOUNTER — MYC MEDICAL ADVICE (OUTPATIENT)
Dept: FAMILY MEDICINE | Facility: CLINIC | Age: 38
End: 2023-03-13
Payer: COMMERCIAL

## 2023-03-13 DIAGNOSIS — F90.2 ATTENTION DEFICIT HYPERACTIVITY DISORDER (ADHD), COMBINED TYPE: ICD-10-CM

## 2023-03-13 RX ORDER — METHYLPHENIDATE HYDROCHLORIDE 30 MG/1
30 CAPSULE, EXTENDED RELEASE ORAL DAILY
Qty: 30 CAPSULE | Refills: 0 | Status: CANCELLED | OUTPATIENT
Start: 2023-03-13

## 2023-03-14 RX ORDER — METHYLPHENIDATE HYDROCHLORIDE 30 MG/1
30 CAPSULE, EXTENDED RELEASE ORAL DAILY
Qty: 30 CAPSULE | Refills: 0 | Status: SHIPPED | OUTPATIENT
Start: 2023-05-14 | End: 2023-06-20

## 2023-03-14 RX ORDER — METHYLPHENIDATE HYDROCHLORIDE 30 MG/1
30 CAPSULE, EXTENDED RELEASE ORAL DAILY
Qty: 30 CAPSULE | Refills: 0 | Status: ON HOLD | OUTPATIENT
Start: 2023-04-14 | End: 2023-06-28

## 2023-03-14 RX ORDER — METHYLPHENIDATE HYDROCHLORIDE 30 MG/1
30 CAPSULE, EXTENDED RELEASE ORAL DAILY
Qty: 30 CAPSULE | Refills: 0 | Status: SHIPPED | OUTPATIENT
Start: 2023-03-14 | End: 2023-08-21

## 2023-03-14 NOTE — TELEPHONE ENCOUNTER
Routing refill request to provider for review/approval because:  Drug not on the [facility] refill protocol   See other encounter for MyChart communication from patient.  RN spoke with Bennie: patient has three refills on file with them.  Cancelled all prescriptions per patient request.  Pended order for new three month panel with adjusted dates.  Zuleyma MONTES RN

## 2023-03-23 ENCOUNTER — TRANSFERRED RECORDS (OUTPATIENT)
Dept: HEALTH INFORMATION MANAGEMENT | Facility: CLINIC | Age: 38
End: 2023-03-23
Payer: COMMERCIAL

## 2023-04-16 ENCOUNTER — HEALTH MAINTENANCE LETTER (OUTPATIENT)
Age: 38
End: 2023-04-16

## 2023-05-16 ENCOUNTER — TRANSFERRED RECORDS (OUTPATIENT)
Dept: HEALTH INFORMATION MANAGEMENT | Facility: CLINIC | Age: 38
End: 2023-05-16
Payer: COMMERCIAL

## 2023-06-13 ENCOUNTER — OFFICE VISIT (OUTPATIENT)
Dept: FAMILY MEDICINE | Facility: CLINIC | Age: 38
End: 2023-06-13
Payer: COMMERCIAL

## 2023-06-13 ENCOUNTER — TRANSFERRED RECORDS (OUTPATIENT)
Dept: HEALTH INFORMATION MANAGEMENT | Facility: CLINIC | Age: 38
End: 2023-06-13

## 2023-06-13 VITALS
OXYGEN SATURATION: 100 % | DIASTOLIC BLOOD PRESSURE: 82 MMHG | RESPIRATION RATE: 18 BRPM | HEIGHT: 65 IN | BODY MASS INDEX: 34.82 KG/M2 | TEMPERATURE: 99.7 F | WEIGHT: 209 LBS | SYSTOLIC BLOOD PRESSURE: 122 MMHG | HEART RATE: 71 BPM

## 2023-06-13 DIAGNOSIS — Z01.818 PREOP EXAM FOR INTERNAL MEDICINE: Primary | ICD-10-CM

## 2023-06-13 DIAGNOSIS — M51.26 LUMBAR DISC HERNIATION: ICD-10-CM

## 2023-06-13 LAB
ANION GAP SERPL CALCULATED.3IONS-SCNC: 12 MMOL/L (ref 7–15)
BASOPHILS # BLD AUTO: 0.1 10E3/UL (ref 0–0.2)
BASOPHILS NFR BLD AUTO: 1 %
BUN SERPL-MCNC: 11.2 MG/DL (ref 6–20)
CALCIUM SERPL-MCNC: 9.3 MG/DL (ref 8.6–10)
CHLORIDE SERPL-SCNC: 103 MMOL/L (ref 98–107)
CREAT SERPL-MCNC: 0.89 MG/DL (ref 0.51–0.95)
DEPRECATED HCO3 PLAS-SCNC: 23 MMOL/L (ref 22–29)
EOSINOPHIL # BLD AUTO: 0.2 10E3/UL (ref 0–0.7)
EOSINOPHIL NFR BLD AUTO: 3 %
ERYTHROCYTE [DISTWIDTH] IN BLOOD BY AUTOMATED COUNT: 12.9 % (ref 10–15)
GFR SERPL CREATININE-BSD FRML MDRD: 84 ML/MIN/1.73M2
GLUCOSE SERPL-MCNC: 118 MG/DL (ref 70–99)
HCT VFR BLD AUTO: 40 % (ref 35–47)
HGB BLD-MCNC: 13.4 G/DL (ref 11.7–15.7)
IMM GRANULOCYTES # BLD: 0 10E3/UL
IMM GRANULOCYTES NFR BLD: 0 %
LYMPHOCYTES # BLD AUTO: 2.7 10E3/UL (ref 0.8–5.3)
LYMPHOCYTES NFR BLD AUTO: 35 %
MCH RBC QN AUTO: 30 PG (ref 26.5–33)
MCHC RBC AUTO-ENTMCNC: 33.5 G/DL (ref 31.5–36.5)
MCV RBC AUTO: 90 FL (ref 78–100)
MONOCYTES # BLD AUTO: 0.7 10E3/UL (ref 0–1.3)
MONOCYTES NFR BLD AUTO: 9 %
NEUTROPHILS # BLD AUTO: 4.1 10E3/UL (ref 1.6–8.3)
NEUTROPHILS NFR BLD AUTO: 52 %
PLATELET # BLD AUTO: 215 10E3/UL (ref 150–450)
POTASSIUM SERPL-SCNC: 3.7 MMOL/L (ref 3.4–5.3)
RBC # BLD AUTO: 4.47 10E6/UL (ref 3.8–5.2)
SODIUM SERPL-SCNC: 138 MMOL/L (ref 136–145)
WBC # BLD AUTO: 7.8 10E3/UL (ref 4–11)

## 2023-06-13 PROCEDURE — 80048 BASIC METABOLIC PNL TOTAL CA: CPT | Performed by: INTERNAL MEDICINE

## 2023-06-13 PROCEDURE — 99214 OFFICE O/P EST MOD 30 MIN: CPT | Performed by: INTERNAL MEDICINE

## 2023-06-13 PROCEDURE — 36415 COLL VENOUS BLD VENIPUNCTURE: CPT | Performed by: INTERNAL MEDICINE

## 2023-06-13 PROCEDURE — 85025 COMPLETE CBC W/AUTO DIFF WBC: CPT | Performed by: INTERNAL MEDICINE

## 2023-06-13 ASSESSMENT — PATIENT HEALTH QUESTIONNAIRE - PHQ9
SUM OF ALL RESPONSES TO PHQ QUESTIONS 1-9: 3
10. IF YOU CHECKED OFF ANY PROBLEMS, HOW DIFFICULT HAVE THESE PROBLEMS MADE IT FOR YOU TO DO YOUR WORK, TAKE CARE OF THINGS AT HOME, OR GET ALONG WITH OTHER PEOPLE: NOT DIFFICULT AT ALL
SUM OF ALL RESPONSES TO PHQ QUESTIONS 1-9: 3

## 2023-06-13 ASSESSMENT — PAIN SCALES - GENERAL: PAINLEVEL: EXTREME PAIN (8)

## 2023-06-13 NOTE — PROGRESS NOTES
[address], [address]  [facility] [address]  Phone: [phone]  Primary Provider: Jovanny Zayas  Pre-op Performing Provider: KHAI PRADO      PREOPERATIVE EVALUATION:  Today's date: 6/13/2023    Nel Durán is a 38 year old female who presents for a preoperative evaluation.       View : No data to display.              Surgical Information:  Surgery/Procedure: Lumbar Fusion  Surgery Location: [facility]  Surgeon: Grant  Surgery Date: 6/26/23  Time of Surgery: TBD  Where patient plans to recover: At home with family  Fax number for surgical facility: [phone]    Assessment & Plan     The proposed surgical procedure is considered INTERMEDIATE risk.    Preop exam for internal medicine  Further diagnostics not indicated.  Labs as requested are ordered.  Proceed as planned.    - CBC with platelets and differential  - Basic metabolic panel  (Ca, Cl, CO2, Creat, Gluc, K, Na, BUN)    Lumbar disc herniation  As above  - CBC with platelets and differential  - Basic metabolic panel  (Ca, Cl, CO2, Creat, Gluc, K, Na, BUN)         - No identified additional risk factors other than previously addressed    Antiplatelet or Anticoagulation Medication Instructions:   - Patient is on no antiplatelet or anticoagulation medications.    Additional Medication Instructions:  no ritalin am of surgery    RECOMMENDATION:  APPROVAL GIVEN to proceed with proposed procedure, without further diagnostic evaluation.      Subjective       HPI related to upcoming procedure:     Pt planning L5 S1 surgery for herniated disks    Has already had 2 micro discectomies but has had re herniation.    Multiple past surgeries, no h/o anesthesia intolerance.      No personal heart disease.  Home work easily top 4 METS.            6/13/2023     1:19 PM   Preop Questions   1. Have you ever had a heart attack or stroke? No   2. Have you ever had surgery on your heart or blood vessels, such as a  stent placement, a coronary artery bypass, or surgery on an artery in your head, neck, heart, or legs? No   3. Do you have chest pain with activity? No   4. Do you have a history of  heart failure? No   5. Do you currently have a cold, bronchitis or symptoms of other infection? No   6. Do you have a cough, shortness of breath, or wheezing? No   7. Do you or anyone in your family have previous history of blood clots? No   8. Do you or does anyone in your family have a serious bleeding problem such as prolonged bleeding following surgeries or cuts? No   9. Have you ever had problems with anemia or been told to take iron pills? YES - post hyesterectomy.     10. Have you had any abnormal blood loss such as black, tarry or bloody stools, or abnormal vaginal bleeding? No   11. Have you ever had a blood transfusion? YES - post hysterectomy   11a. Have you ever had a transfusion reaction? UNKNOWN -    12. Are you willing to have a blood transfusion if it is medically needed before, during, or after your surgery? Yes   13. Have you or any of your relatives ever had problems with anesthesia? No   14. Do you have sleep apnea, excessive snoring or daytime drowsiness? No   15. Do you have any artifical heart valves or other implanted medical devices like a pacemaker, defibrillator, or continuous glucose monitor? No   16. Do you have artificial joints? No   17. Are you allergic to latex? No   18. Is there any chance that you may be pregnant? No     Health Care Directive:  Patient does not have a Health Care Directive or Living Will: Discussed advance care planning with patient; however, patient declined at this time.    Preoperative Review of :   reviewed - Query errored out. pt not on controlled substances          Review of Systems  Constitutional, neuro, ENT, endocrine, pulmonary, cardiac, gastrointestinal, genitourinary, musculoskeletal, integument and psychiatric systems are negative, except as otherwise  noted.    Patient Active Problem List    Diagnosis Date Noted     Renal mass 03/08/2022     Priority: Medium     Abdominal pain, generalized 12/29/2021     Priority: Medium     Pyelonephritis 12/29/2021     Priority: Medium     Right flank pain 12/29/2021     Priority: Medium     Fever 01/14/2015     Priority: Medium     Endometriosis 09/12/2014     Priority: Medium     Problem list name updated by automated process. Provider to review       Abdominal pain, left lower quadrant 05/27/2014     Priority: Medium     Concentration deficit 09/17/2013     Priority: Medium     Dysthymia 11/29/2011     Priority: Medium     CARDIOVASCULAR SCREENING; LDL GOAL LESS THAN 160 10/31/2010     Priority: Medium      Past Medical History:   Diagnosis Date     ADD (attention deficit disorder)      ASCUS favor benign 2/2014    neg hpv cotest in 3 yrs     depression 2000    Not currently on meds     Depressive disorder      History of blood transfusion 4/2013    6 units after hysterectomy     Menarche age 12     Pelvic pain      Past Surgical History:   Procedure Laterality Date     BACK SURGERY  10/2022, 12/2022    L4,5,S1     BIOPSY       BLADDER SURGERY       C/SECTION, LOW TRANSVERSE        x2     CERVICECTOMY (TRACHELECTOMY) N/A 01/09/2015    not done     COLONOSCOPY  06/12/2014    Procedure: COMBINED COLONOSCOPY, SINGLE BIOPSY/POLYPECTOMY BY BIOPSY;  Surgeon: Elizabeth Shepard MD;  Location: [facility]     CYSTOSCOPY  04/05/2013    Procedure: CYSTOSCOPY;;  Surgeon: Mariel Smith MD;  [facility]     DAVINCI NEPHRECTOMY PARTIAL Left 03/08/2022    Procedure: NEPHRECTOMY, PARTIAL, ROBOT-ASSISTED, LAPAROSCOPIC WITH INTRAOPERATIVE ULTRASOUND;  Surgeon: aMtthew Aranda MD;  Location: [facility]     LABIALPLASTY  04/04/2013    Procedure: LABIALPLASTY;;  Surgeon: Leticia Staley MD;  [facility]     LAPAROSCOPIC HYSTERECTOMY SUPRACERVICAL  04/04/2013    simple hyperplasiaProcedure: LAPAROSCOPIC  HYSTERECTOMY SUPRACERVICAL;  Laparoscopic Supracervical Hysterectomy, left labialplasty;  Surgeon: Leticia Staley MD;  [facility]     LAPAROSCOPIC LYSIS ADHESIONS N/A 01/09/2015    Procedure: LAPAROSCOPIC LYSIS ADHESIONS;  Surgeon: Sue Johnston MD;  [facility]     LAPAROSCOPIC TUBAL LIGATION  03/07/2013    Procedure: LAPAROSCOPIC TUBAL LIGATION;  Bilateral Laparoscopic Tubal Ligation With Intrauterine Device Removal ;  Surgeon: Jaime Patrick MD;  [facility]     LAPAROSCOPY DIAGNOSTIC (GYN)  04/05/2013    Procedure: LAPAROSCOPY DIAGNOSTIC (GYN);;  Surgeon: Mariel Smith MD;  [facility]     LAPAROSCOPY OPERATIVE ADULT N/A 01/09/2015    Procedure: LAPAROSCOPY OPERATIVE ADULT;  Surgeon: Sue Johnston MD;  [facility]     LAPAROTOMY EXPLORATORY  04/05/2013    Procedure: LAPAROTOMY EXPLORATORY;  ligasure of pedicles and cervical stump;  Surgeon: Mariel Smith MD;  [facility]     Current Outpatient Medications   Medication Sig Dispense Refill     B Complex-C-Folic Acid (B COMPLEX + C TR PO)        buPROPion (WELLBUTRIN XL) 150 MG 24 hr tablet Take 1 tablet (150 mg) by mouth every morning 30 tablet 4     cetirizine (ZYRTEC) 10 MG tablet Take 10 mg by mouth daily       methylphenidate (RITALIN LA) 30 MG 24 hr capsule Take 1 capsule (30 mg) by mouth daily 30 capsule 0     ondansetron (ZOFRAN ODT) 4 MG ODT tab Take 1 tablet (4 mg) by mouth every 8 hours as needed for nausea 18 tablet 0     Prenatal Vit-Fe Fumarate-FA (PRENATAL VITAMIN PO)        SUMAtriptan (IMITREX) 50 MG tablet Take 1 tablet (50 mg) by mouth at onset of headache for migraine 12 tablet 3     methylphenidate (RITALIN LA) 30 MG 24 hr capsule Take 1 capsule (30 mg) by mouth daily (Patient not taking: Reported on 6/13/2023) 30 capsule 0     methylphenidate (RITALIN LA) 30 MG 24 hr capsule Take 1 capsule (30 mg) by mouth daily (Patient not taking: Reported on 6/13/2023) 30 capsule 0  "      Allergies   Allergen Reactions     Gluten Meal      Iodine         Social History     Tobacco Use     Smoking status: Never     Smokeless tobacco: Never   Vaping Use     Vaping status: Not on file   Substance Use Topics     Alcohol use: No     Comment: social       History   Drug Use No         Objective     /82 (BP Location: Left arm, Patient Position: Chair, Cuff Size: Adult Large)   Pulse 71   Temp 99.7  F (37.6  C) (Temporal)   Resp 18   Ht 1.651 m (5' 5\")   Wt 94.8 kg (209 lb)   LMP 03/09/2013   SpO2 100%   Breastfeeding No   BMI 34.78 kg/m      Physical Exam    GENERAL APPEARANCE: healthy, alert and no distress     EYES: EOMI, PERRL     HENT: ear canals and TM's normal and nose and mouth without ulcers or lesions     NECK: no adenopathy, no asymmetry, masses, or scars and thyroid normal to palpation     RESP: lungs clear to auscultation - no rales, rhonchi or wheezes     CV: regular rates and rhythm, normal S1 S2, no S3 or S4 and no murmur, click or rub     ABDOMEN:  soft, nontender, no HSM or masses and bowel sounds normal     MS: extremities normal- no gross deformities noted, no evidence of inflammation in joints, FROM in all extremities.     SKIN: no suspicious lesions or rashes     NEURO: Normal strength and tone, sensory exam grossly normal, mentation intact and speech normal     PSYCH: mentation appears normal. and affect normal/bright     LYMPHATICS: No cervical adenopathy    Recent Labs   Lab Test 10/20/22  1425 03/09/22  0704 03/04/22  1435   HGB 13.0 11.0* 14.1    142* 221   NA  --  138 141   POTASSIUM  --  3.6 4.0   CR  --  0.87 0.73        Diagnostics:  Labs pending at this time.  Results will be reviewed when available.   No EKG required, no history of coronary heart disease, significant arrhythmia, peripheral arterial disease or other structural heart disease.    Revised Cardiac Risk Index (RCRI):  The patient has the following serious cardiovascular risks for " perioperative complications:   - No serious cardiac risks = 0 points     RCRI Interpretation: 0 points: Class I (very low risk - 0.4% complication rate)           Signed Electronically by: Duarte Stout MD  Copy of this evaluation report is provided to requesting physician.      Answers for HPI/ROS submitted by the patient on 6/13/2023  If you checked off any problems, how difficult have these problems made it for you to do your work, take care of things at home, or get along with other people?: Not difficult at all  PHQ9 TOTAL SCORE: 3

## 2023-06-20 DIAGNOSIS — F90.2 ATTENTION DEFICIT HYPERACTIVITY DISORDER (ADHD), COMBINED TYPE: ICD-10-CM

## 2023-06-20 RX ORDER — METHYLPHENIDATE HYDROCHLORIDE 30 MG/1
CAPSULE, EXTENDED RELEASE ORAL
Qty: 30 CAPSULE | Refills: 0 | Status: ON HOLD | OUTPATIENT
Start: 2023-06-20 | End: 2023-06-28

## 2023-06-20 NOTE — TELEPHONE ENCOUNTER
1 month called in.  Due for in person ov, been since 2020.  Expiration of COVID health [employer] now requires yearly in person visits    Cole Zayas PA-C

## 2023-06-20 NOTE — TELEPHONE ENCOUNTER
Patient called.   Following up on refill request - made sure we have received them.  Patient wanted us to know she has back surgery this Monday and would like her refills completed before then.     Zhanna MENDEZ   Statement Selected

## 2023-06-26 ENCOUNTER — TRANSFERRED RECORDS (OUTPATIENT)
Dept: HEALTH INFORMATION MANAGEMENT | Facility: CLINIC | Age: 38
End: 2023-06-26
Payer: COMMERCIAL

## 2023-06-28 ENCOUNTER — MEDICAL CORRESPONDENCE (OUTPATIENT)
Dept: HEALTH INFORMATION MANAGEMENT | Facility: CLINIC | Age: 38
End: 2023-06-28
Payer: COMMERCIAL

## 2023-06-28 ENCOUNTER — HOSPITAL ENCOUNTER (INPATIENT)
Facility: CLINIC | Age: 38
LOS: 8 days | Discharge: HOME | End: 2023-07-06
Attending: ORTHOPAEDIC SURGERY | Admitting: ORTHOPAEDIC SURGERY
Payer: COMMERCIAL

## 2023-06-28 DIAGNOSIS — Z98.1 S/P LUMBAR SPINAL FUSION: Primary | ICD-10-CM

## 2023-06-28 PROCEDURE — 250N000011 HC RX IP 250 OP 636: Mod: JZ | Performed by: ORTHOPAEDIC SURGERY

## 2023-06-28 PROCEDURE — 120N000001 HC R&B MED SURG/OB

## 2023-06-28 PROCEDURE — 250N000013 HC RX MED GY IP 250 OP 250 PS 637: Performed by: ORTHOPAEDIC SURGERY

## 2023-06-28 PROCEDURE — 258N000003 HC RX IP 258 OP 636: Performed by: ORTHOPAEDIC SURGERY

## 2023-06-28 RX ORDER — MAGNESIUM HYDROXIDE/ALUMINUM HYDROXICE/SIMETHICONE 120; 1200; 1200 MG/30ML; MG/30ML; MG/30ML
30 SUSPENSION ORAL EVERY 4 HOURS PRN
Status: DISCONTINUED | OUTPATIENT
Start: 2023-06-28 | End: 2023-07-06 | Stop reason: HOSPADM

## 2023-06-28 RX ORDER — HYDROMORPHONE HYDROCHLORIDE 2 MG/1
4 TABLET ORAL
Status: DISCONTINUED | OUTPATIENT
Start: 2023-06-28 | End: 2023-07-06 | Stop reason: HOSPADM

## 2023-06-28 RX ORDER — HYDROXYZINE HYDROCHLORIDE 25 MG/1
25 TABLET, FILM COATED ORAL EVERY 6 HOURS PRN
Status: DISCONTINUED | OUTPATIENT
Start: 2023-06-28 | End: 2023-07-06 | Stop reason: HOSPADM

## 2023-06-28 RX ORDER — NALOXONE HYDROCHLORIDE 0.4 MG/ML
0.2 INJECTION, SOLUTION INTRAMUSCULAR; INTRAVENOUS; SUBCUTANEOUS
Status: DISCONTINUED | OUTPATIENT
Start: 2023-06-28 | End: 2023-07-06 | Stop reason: HOSPADM

## 2023-06-28 RX ORDER — AMOXICILLIN 250 MG
1 CAPSULE ORAL 2 TIMES DAILY
Status: DISCONTINUED | OUTPATIENT
Start: 2023-06-28 | End: 2023-07-06 | Stop reason: HOSPADM

## 2023-06-28 RX ORDER — POLYETHYLENE GLYCOL 3350 17 G/17G
17 POWDER, FOR SOLUTION ORAL DAILY
Status: DISCONTINUED | OUTPATIENT
Start: 2023-06-29 | End: 2023-07-06 | Stop reason: HOSPADM

## 2023-06-28 RX ORDER — NALOXONE HYDROCHLORIDE 0.4 MG/ML
0.4 INJECTION, SOLUTION INTRAMUSCULAR; INTRAVENOUS; SUBCUTANEOUS
Status: DISCONTINUED | OUTPATIENT
Start: 2023-06-28 | End: 2023-07-06 | Stop reason: HOSPADM

## 2023-06-28 RX ORDER — HYDROMORPHONE HYDROCHLORIDE 2 MG/1
2 TABLET ORAL
Status: DISCONTINUED | OUTPATIENT
Start: 2023-06-28 | End: 2023-07-06 | Stop reason: HOSPADM

## 2023-06-28 RX ORDER — METHOCARBAMOL 750 MG/1
750 TABLET, FILM COATED ORAL EVERY 6 HOURS PRN
Status: DISCONTINUED | OUTPATIENT
Start: 2023-06-28 | End: 2023-07-06 | Stop reason: HOSPADM

## 2023-06-28 RX ORDER — ONDANSETRON 4 MG/1
4 TABLET, ORALLY DISINTEGRATING ORAL EVERY 6 HOURS PRN
Status: DISCONTINUED | OUTPATIENT
Start: 2023-06-28 | End: 2023-07-06 | Stop reason: HOSPADM

## 2023-06-28 RX ORDER — DEXAMETHASONE SODIUM PHOSPHATE 4 MG/ML
4 INJECTION, SOLUTION INTRA-ARTICULAR; INTRALESIONAL; INTRAMUSCULAR; INTRAVENOUS; SOFT TISSUE EVERY 6 HOURS
Status: DISCONTINUED | OUTPATIENT
Start: 2023-06-28 | End: 2023-06-28

## 2023-06-28 RX ORDER — PROCHLORPERAZINE MALEATE 5 MG
10 TABLET ORAL EVERY 6 HOURS PRN
Status: DISCONTINUED | OUTPATIENT
Start: 2023-06-28 | End: 2023-07-06 | Stop reason: HOSPADM

## 2023-06-28 RX ORDER — MULTIVITAMIN WITH IRON
1 TABLET ORAL DAILY
COMMUNITY

## 2023-06-28 RX ORDER — DEXAMETHASONE SODIUM PHOSPHATE 4 MG/ML
4 INJECTION, SOLUTION INTRA-ARTICULAR; INTRALESIONAL; INTRAMUSCULAR; INTRAVENOUS; SOFT TISSUE EVERY 6 HOURS
Status: COMPLETED | OUTPATIENT
Start: 2023-06-28 | End: 2023-06-30

## 2023-06-28 RX ORDER — ONDANSETRON 2 MG/ML
4 INJECTION INTRAMUSCULAR; INTRAVENOUS EVERY 6 HOURS PRN
Status: DISCONTINUED | OUTPATIENT
Start: 2023-06-28 | End: 2023-07-06 | Stop reason: HOSPADM

## 2023-06-28 RX ORDER — ACETAMINOPHEN 325 MG/1
975 TABLET ORAL EVERY 8 HOURS
Status: COMPLETED | OUTPATIENT
Start: 2023-06-28 | End: 2023-07-01

## 2023-06-28 RX ORDER — SODIUM CHLORIDE 9 MG/ML
INJECTION, SOLUTION INTRAVENOUS CONTINUOUS
Status: DISCONTINUED | OUTPATIENT
Start: 2023-06-28 | End: 2023-06-30

## 2023-06-28 RX ORDER — ACETAMINOPHEN 325 MG/1
650 TABLET ORAL EVERY 4 HOURS PRN
Status: DISCONTINUED | OUTPATIENT
Start: 2023-07-01 | End: 2023-07-06 | Stop reason: HOSPADM

## 2023-06-28 RX ORDER — HYDROMORPHONE HCL IN WATER/PF 6 MG/30 ML
0.2 PATIENT CONTROLLED ANALGESIA SYRINGE INTRAVENOUS
Status: DISCONTINUED | OUTPATIENT
Start: 2023-06-28 | End: 2023-07-06 | Stop reason: HOSPADM

## 2023-06-28 RX ORDER — HYDROMORPHONE HCL IN WATER/PF 6 MG/30 ML
0.4 PATIENT CONTROLLED ANALGESIA SYRINGE INTRAVENOUS
Status: DISCONTINUED | OUTPATIENT
Start: 2023-06-28 | End: 2023-07-06 | Stop reason: HOSPADM

## 2023-06-28 RX ORDER — BISACODYL 10 MG
10 SUPPOSITORY, RECTAL RECTAL DAILY PRN
Status: DISCONTINUED | OUTPATIENT
Start: 2023-06-28 | End: 2023-07-06 | Stop reason: HOSPADM

## 2023-06-28 RX ORDER — MAGNESIUM HYDROXIDE/ALUMINUM HYDROXICE/SIMETHICONE 120; 1200; 1200 MG/30ML; MG/30ML; MG/30ML
30 SUSPENSION ORAL EVERY 4 HOURS PRN
Status: DISCONTINUED | OUTPATIENT
Start: 2023-06-28 | End: 2023-06-28

## 2023-06-28 RX ORDER — LIDOCAINE 40 MG/G
CREAM TOPICAL
Status: DISCONTINUED | OUTPATIENT
Start: 2023-06-28 | End: 2023-07-06 | Stop reason: HOSPADM

## 2023-06-28 RX ORDER — GABAPENTIN 300 MG/1
300 CAPSULE ORAL 3 TIMES DAILY
Status: DISCONTINUED | OUTPATIENT
Start: 2023-06-28 | End: 2023-07-06 | Stop reason: HOSPADM

## 2023-06-28 RX ORDER — CEFAZOLIN SODIUM 2 G/100ML
2 INJECTION, SOLUTION INTRAVENOUS EVERY 8 HOURS
Status: DISCONTINUED | OUTPATIENT
Start: 2023-06-28 | End: 2023-06-28

## 2023-06-28 RX ADMIN — DEXAMETHASONE SODIUM PHOSPHATE 4 MG: 4 INJECTION, SOLUTION INTRAMUSCULAR; INTRAVENOUS at 21:02

## 2023-06-28 RX ADMIN — METHOCARBAMOL TABLETS 750 MG: 750 TABLET, COATED ORAL at 18:06

## 2023-06-28 RX ADMIN — HYDROMORPHONE HYDROCHLORIDE 0.4 MG: 0.2 INJECTION, SOLUTION INTRAMUSCULAR; INTRAVENOUS; SUBCUTANEOUS at 21:00

## 2023-06-28 RX ADMIN — HYDROMORPHONE HYDROCHLORIDE 2 MG: 2 TABLET ORAL at 19:11

## 2023-06-28 RX ADMIN — ACETAMINOPHEN 975 MG: 325 TABLET, FILM COATED ORAL at 21:01

## 2023-06-28 RX ADMIN — HYDROMORPHONE HYDROCHLORIDE 0.4 MG: 0.2 INJECTION, SOLUTION INTRAMUSCULAR; INTRAVENOUS; SUBCUTANEOUS at 18:06

## 2023-06-28 RX ADMIN — GABAPENTIN 300 MG: 300 CAPSULE ORAL at 21:02

## 2023-06-28 RX ADMIN — SENNOSIDES AND DOCUSATE SODIUM 1 TABLET: 50; 8.6 TABLET ORAL at 21:01

## 2023-06-28 RX ADMIN — HYDROMORPHONE HYDROCHLORIDE 0.4 MG: 0.2 INJECTION, SOLUTION INTRAMUSCULAR; INTRAVENOUS; SUBCUTANEOUS at 23:41

## 2023-06-28 RX ADMIN — DEXAMETHASONE SODIUM PHOSPHATE 4 MG: 4 INJECTION, SOLUTION INTRAMUSCULAR; INTRAVENOUS at 23:40

## 2023-06-28 RX ADMIN — METHOCARBAMOL TABLETS 750 MG: 750 TABLET, COATED ORAL at 23:40

## 2023-06-28 RX ADMIN — SODIUM CHLORIDE: 9 INJECTION, SOLUTION INTRAVENOUS at 18:06

## 2023-06-28 ASSESSMENT — ACTIVITIES OF DAILY LIVING (ADL)
ADLS_ACUITY_SCORE: 35

## 2023-06-28 NOTE — H&P
Patient being transferred from [facility] care suites following TLIF left L5-S1 on 6/26/23 by myself.  Case complicated by incidental durotomy, managed with fibrin sealant with no ongoing leak demonstrated intraoperatively.    Postoperatively she has been maintained on flat bedrest with no associated spinal headache and pain had been well controlled until early this morning when she began developing severe RLE pain in a nondermatomal pattern.  This is not an expected symptom set based on the intraoperative durotomy pattern.  Pain has been difficult to manage in the [facility] care suites, and patient will require more imaging requiring transfer to [facility].    Will plan to transfer this afternoon and maintain flat HOB pending further imaging.      Will obtain MRI L spine with and without contrast for further evaluation.      Torrey Levi MD  Orthopaedic Spine Surgery  [facility]     Consent (Near Eyelid Margin)/Introductory Paragraph: The rationale for Mohs was explained to the patient and consent was obtained. The risks, benefits and alternatives to therapy were discussed in detail. Specifically, the risks of ectropion or eyelid deformity, infection, scarring, bleeding, prolonged wound healing, incomplete removal, allergy to anesthesia, nerve injury and recurrence were addressed. Prior to the procedure, the treatment site was clearly identified and confirmed by the patient. All components of Universal Protocol/PAUSE Rule completed.

## 2023-06-28 NOTE — PROGRESS NOTES
Ortho Progress Note     Subjective:  Seen on floor on arrival.  Continues to have predominantly RLE pain, roughly L5 distribution though with significant pain also in the right groin which does not match typical radicular pattern.  Pain difficult for patient to discriminate if nerve type or cramping type pains; no associated numbness in RLE.  No LLE radiculopathy, ongoing improvement in S1 distribution numbness.      Objective:  /77 (BP Location: Right arm)   Pulse 70   Temp 98.1  F (36.7  C) (Oral)   Resp 16   LMP 03/09/2013   SpO2 98%   Gen: A&Ox3, no acute distress  CV: 2+ dp/pt pulses, capillary refill < 2sec  Resp: breathing equal and non-labored, no wheezing  MSK:       Spine:   Skin: intact over lumbar spine without evidence of infection or persistent drainage.  Left posterior incision dressing changed, incision site looks clean and intact with no active drainage occurring.     Sensation:      R       L    L3:   Intact   Intact    L4:   Intact   Intact    L5:   Intact   Intact    S1:   Intact   Intact     Motor:     R L    L3: Psoas    5  5    L4:   Quad    5  5    L4: TA   5 5    L5: EHL    5  5    S1: Eversion/PF  5  5        Hemoglobin   Date Value Ref Range Status   06/13/2023 13.4 11.7 - 15.7 g/dL Final   10/20/2022 13.0 11.7 - 15.7 g/dL Final   02/05/2018 13.6 11.7 - 15.7 g/dL Final   01/15/2015 9.9 (L) 11.7 - 15.7 g/dL Final        Assessment/Plan:  S/p 6/26/23 TLIF left L5-S1 for multiply recurrent disc herniation, complicated by incidental durotomy managed with fibrin sealant.  New onset RLE pain over past 18 hours which would not be expected based on the durotomy location.    -Activity: Bedrest, HOB 0 - 30 degrees if no headaches occurring.  No raising HOB past 30 degrees.  -DVT PPx: SCDs only in case return to OR necessary  -Pain control: IV and PO, will start steroids and gabapentin   -Dressing: Monitor, notify MD of any significant drainage  -Diet: NPO until MRI obtained, then expect  regular diet once MD reviews MRI    -Disposition: Pending hospital course      Torrey Levi MD  Orthopedic Spine Surgery  [facility]

## 2023-06-29 ENCOUNTER — APPOINTMENT (OUTPATIENT)
Dept: MRI IMAGING | Facility: CLINIC | Age: 38
End: 2023-06-29
Attending: ORTHOPAEDIC SURGERY
Payer: COMMERCIAL

## 2023-06-29 LAB
ANION GAP SERPL CALCULATED.3IONS-SCNC: 10 MMOL/L (ref 7–15)
BUN SERPL-MCNC: 7.5 MG/DL (ref 6–20)
CALCIUM SERPL-MCNC: 8.2 MG/DL (ref 8.6–10)
CHLORIDE SERPL-SCNC: 107 MMOL/L (ref 98–107)
CREAT SERPL-MCNC: 0.72 MG/DL (ref 0.51–0.95)
CRP SERPL-MCNC: 21.92 MG/L
DEPRECATED HCO3 PLAS-SCNC: 22 MMOL/L (ref 22–29)
ERYTHROCYTE [DISTWIDTH] IN BLOOD BY AUTOMATED COUNT: 12.4 % (ref 10–15)
ERYTHROCYTE [SEDIMENTATION RATE] IN BLOOD BY WESTERGREN METHOD: 19 MM/HR (ref 0–20)
GFR SERPL CREATININE-BSD FRML MDRD: >90 ML/MIN/1.73M2
GLUCOSE SERPL-MCNC: 125 MG/DL (ref 70–99)
HCT VFR BLD AUTO: 34.8 % (ref 35–47)
HGB BLD-MCNC: 11.2 G/DL (ref 11.7–15.7)
MCH RBC QN AUTO: 29.8 PG (ref 26.5–33)
MCHC RBC AUTO-ENTMCNC: 32.2 G/DL (ref 31.5–36.5)
MCV RBC AUTO: 93 FL (ref 78–100)
PLATELET # BLD AUTO: 181 10E3/UL (ref 150–450)
POTASSIUM SERPL-SCNC: 4.1 MMOL/L (ref 3.4–5.3)
RBC # BLD AUTO: 3.76 10E6/UL (ref 3.8–5.2)
SODIUM SERPL-SCNC: 139 MMOL/L (ref 136–145)
WBC # BLD AUTO: 6.2 10E3/UL (ref 4–11)

## 2023-06-29 PROCEDURE — 72158 MRI LUMBAR SPINE W/O & W/DYE: CPT

## 2023-06-29 PROCEDURE — 258N000003 HC RX IP 258 OP 636: Performed by: ORTHOPAEDIC SURGERY

## 2023-06-29 PROCEDURE — 36415 COLL VENOUS BLD VENIPUNCTURE: CPT | Performed by: ORTHOPAEDIC SURGERY

## 2023-06-29 PROCEDURE — 85027 COMPLETE CBC AUTOMATED: CPT | Performed by: ORTHOPAEDIC SURGERY

## 2023-06-29 PROCEDURE — A9585 GADOBUTROL INJECTION: HCPCS | Performed by: ORTHOPAEDIC SURGERY

## 2023-06-29 PROCEDURE — 250N000011 HC RX IP 250 OP 636: Mod: JZ | Performed by: ORTHOPAEDIC SURGERY

## 2023-06-29 PROCEDURE — 255N000002 HC RX 255 OP 636: Performed by: ORTHOPAEDIC SURGERY

## 2023-06-29 PROCEDURE — 120N000001 HC R&B MED SURG/OB

## 2023-06-29 PROCEDURE — 85652 RBC SED RATE AUTOMATED: CPT | Performed by: ORTHOPAEDIC SURGERY

## 2023-06-29 PROCEDURE — 86140 C-REACTIVE PROTEIN: CPT | Performed by: ORTHOPAEDIC SURGERY

## 2023-06-29 PROCEDURE — 250N000013 HC RX MED GY IP 250 OP 250 PS 637: Performed by: ORTHOPAEDIC SURGERY

## 2023-06-29 PROCEDURE — 80048 BASIC METABOLIC PNL TOTAL CA: CPT | Performed by: ORTHOPAEDIC SURGERY

## 2023-06-29 RX ORDER — METHYLPHENIDATE HYDROCHLORIDE 30 MG/1
30 CAPSULE, EXTENDED RELEASE ORAL DAILY
Status: DISCONTINUED | OUTPATIENT
Start: 2023-06-29 | End: 2023-06-29

## 2023-06-29 RX ORDER — SUMATRIPTAN 50 MG/1
50 TABLET, FILM COATED ORAL
Status: DISCONTINUED | OUTPATIENT
Start: 2023-06-29 | End: 2023-07-06 | Stop reason: HOSPADM

## 2023-06-29 RX ORDER — GADOBUTROL 604.72 MG/ML
9 INJECTION INTRAVENOUS ONCE
Status: COMPLETED | OUTPATIENT
Start: 2023-06-29 | End: 2023-06-29

## 2023-06-29 RX ORDER — DIAZEPAM 5 MG
5 TABLET ORAL EVERY 6 HOURS PRN
Status: DISCONTINUED | OUTPATIENT
Start: 2023-06-29 | End: 2023-07-06 | Stop reason: HOSPADM

## 2023-06-29 RX ORDER — BUPROPION HYDROCHLORIDE 150 MG/1
150 TABLET ORAL EVERY MORNING
Status: DISCONTINUED | OUTPATIENT
Start: 2023-06-29 | End: 2023-07-06 | Stop reason: HOSPADM

## 2023-06-29 RX ADMIN — HYDROMORPHONE HYDROCHLORIDE 0.4 MG: 0.2 INJECTION, SOLUTION INTRAMUSCULAR; INTRAVENOUS; SUBCUTANEOUS at 10:58

## 2023-06-29 RX ADMIN — GABAPENTIN 300 MG: 300 CAPSULE ORAL at 15:53

## 2023-06-29 RX ADMIN — DIAZEPAM 5 MG: 5 TABLET ORAL at 15:53

## 2023-06-29 RX ADMIN — HYDROMORPHONE HYDROCHLORIDE 0.4 MG: 0.2 INJECTION, SOLUTION INTRAMUSCULAR; INTRAVENOUS; SUBCUTANEOUS at 14:24

## 2023-06-29 RX ADMIN — HYDROXYZINE HYDROCHLORIDE 25 MG: 25 TABLET, FILM COATED ORAL at 18:19

## 2023-06-29 RX ADMIN — DEXAMETHASONE SODIUM PHOSPHATE 4 MG: 4 INJECTION, SOLUTION INTRAMUSCULAR; INTRAVENOUS at 17:27

## 2023-06-29 RX ADMIN — HYDROXYZINE HYDROCHLORIDE 25 MG: 25 TABLET, FILM COATED ORAL at 02:22

## 2023-06-29 RX ADMIN — POLYETHYLENE GLYCOL 3350 17 G: 17 POWDER, FOR SOLUTION ORAL at 08:30

## 2023-06-29 RX ADMIN — HYDROMORPHONE HYDROCHLORIDE 4 MG: 2 TABLET ORAL at 20:25

## 2023-06-29 RX ADMIN — GADOBUTROL 9 ML: 604.72 INJECTION INTRAVENOUS at 10:58

## 2023-06-29 RX ADMIN — ACETAMINOPHEN 975 MG: 325 TABLET, FILM COATED ORAL at 12:18

## 2023-06-29 RX ADMIN — SODIUM CHLORIDE: 9 INJECTION, SOLUTION INTRAVENOUS at 03:54

## 2023-06-29 RX ADMIN — DEXAMETHASONE SODIUM PHOSPHATE 4 MG: 4 INJECTION, SOLUTION INTRAMUSCULAR; INTRAVENOUS at 11:08

## 2023-06-29 RX ADMIN — SENNOSIDES AND DOCUSATE SODIUM 1 TABLET: 50; 8.6 TABLET ORAL at 20:26

## 2023-06-29 RX ADMIN — GABAPENTIN 300 MG: 300 CAPSULE ORAL at 21:30

## 2023-06-29 RX ADMIN — SENNOSIDES AND DOCUSATE SODIUM 1 TABLET: 50; 8.6 TABLET ORAL at 08:28

## 2023-06-29 RX ADMIN — DIAZEPAM 5 MG: 5 TABLET ORAL at 08:28

## 2023-06-29 RX ADMIN — ACETAMINOPHEN 975 MG: 325 TABLET, FILM COATED ORAL at 03:54

## 2023-06-29 RX ADMIN — DEXAMETHASONE SODIUM PHOSPHATE 4 MG: 4 INJECTION, SOLUTION INTRAMUSCULAR; INTRAVENOUS at 05:45

## 2023-06-29 RX ADMIN — ACETAMINOPHEN 975 MG: 325 TABLET, FILM COATED ORAL at 20:26

## 2023-06-29 RX ADMIN — BUPROPION HYDROCHLORIDE 150 MG: 150 TABLET, FILM COATED, EXTENDED RELEASE ORAL at 08:28

## 2023-06-29 RX ADMIN — HYDROMORPHONE HYDROCHLORIDE 0.4 MG: 0.2 INJECTION, SOLUTION INTRAMUSCULAR; INTRAVENOUS; SUBCUTANEOUS at 05:54

## 2023-06-29 RX ADMIN — HYDROMORPHONE HYDROCHLORIDE 0.4 MG: 0.2 INJECTION, SOLUTION INTRAMUSCULAR; INTRAVENOUS; SUBCUTANEOUS at 08:28

## 2023-06-29 RX ADMIN — GABAPENTIN 300 MG: 300 CAPSULE ORAL at 08:28

## 2023-06-29 RX ADMIN — HYDROMORPHONE HYDROCHLORIDE 2 MG: 2 TABLET ORAL at 12:15

## 2023-06-29 RX ADMIN — HYDROMORPHONE HYDROCHLORIDE 0.4 MG: 0.2 INJECTION, SOLUTION INTRAMUSCULAR; INTRAVENOUS; SUBCUTANEOUS at 02:22

## 2023-06-29 ASSESSMENT — ACTIVITIES OF DAILY LIVING (ADL)
ADLS_ACUITY_SCORE: 35

## 2023-06-29 NOTE — PHARMACY-ADMISSION MEDICATION HISTORY
Pharmacist Admission Medication History    Admission medication history is complete. The information provided in this note is only as accurate as the sources available at the time of the update.    Medication reconciliation/reorder completed by provider prior to medication history? No    Information Source(s): Patient, Facility ([facility] medication list/MAR and [facility] from [facility] via in-person    Pertinent Information: Patient from [facility] receiving the following medications, not on PTA list/MAR not available in Epic, see paper copy in patients chart for more details, these were the pertinent last doses of medications she received at [facility]:    -APAP 1000 mg po q6h - last dose given 6/28 @1345  -Tordol 15 mg IV f2nzaox for 24 hours post op - last dose given 6/27 @0200  -Valium 5 mg po q6h prn - last dose given 6/28 @1345  -Oxycodone 5 mg q4-6 hours prn - last dose given 6/26 @1430  -Dilaudid 4 mg po [id number] prn - last dose given 6/28 @ 1045  -Dilaudid 0.5 mg IV q1 hour prn - last dose given 6/28 @1610  -Senna S 50/8.6 mg 2 tabs po BID - last dose given 6/27 @1830  -Miralax and Ferrous sulfate declined  -Omeprazole 20 mg daily - last dose given 6/27 @ 0815  -TUMS 500 mg PRN  -Scop patch PRN  -Dexamethasone 4 mg IV q6 hours - last dose given 6/27 @0030  -NaCl 0.9% 1000 ml bolus - last dose given 6/28 @0750  -Ancef 1g Q8hr x 2 doses post op - last dose given 6/27 @ 0000    Changes made to PTA medication list:    Added: None    Deleted: Duplicate Methylphenidate Rxs    Changed: None    Medication Affordability:       Allergies reviewed with patient and updates made in EHR: yes    Medication History Completed By: Shey Gillette RPH 6/28/2023 7:42 PM    Prior to Admission medications    Medication Sig Last Dose Taking? Auth Provider Long Term End Date   buPROPion (WELLBUTRIN XL) 150 MG 24 hr tablet Take 1 tablet (150 mg) by mouth every morning 6/28/2023 at am Yes Jovanny Zayas PA-C Yes    cetirizine (ZYRTEC) 10  MG tablet Take 10 mg by mouth daily Past Week Yes Reported, Patient     methylphenidate (RITALIN LA) 30 MG 24 hr capsule Take 1 capsule (30 mg) by mouth daily Past Week Yes Jovanny Zayas PA-C     ondansetron (ZOFRAN ODT) 4 MG ODT tab Take 1 tablet (4 mg) by mouth every 8 hours as needed for nausea  at prn Yes Jovanny Zayas PA-C     Prenatal Vit-Fe Fumarate-FA (PRENATAL VITAMIN) 27-0.8 MG TABS Take 1 tablet by mouth daily Past Week Yes Reported, Patient     SUMAtriptan (IMITREX) 50 MG tablet Take 1 tablet (50 mg) by mouth at onset of headache for migraine  at prn Yes Jovanny Zayas PA-C     vitamin C with B complex (B COMPLEX-C) tablet Take 1 tablet by mouth daily Past Week Yes Unknown, Entered By History

## 2023-06-29 NOTE — PROGRESS NOTES
Ortho Progress Note     Subjective:  MRI not able to be done overnight, hopefully this morning.  Continues to have predominantly RLE pain, roughly L5 distribution though with her most significant pain in the right groin.  No associated numbness in RLE.  No LLE radicular pain, ongoing improvement in S1 distribution numbness.  Gradual improvement in surgical site pain.    Objective:  /79 (BP Location: Right arm, Patient Position: Supine)   Pulse 75   Temp 98.3  F (36.8  C) (Oral)   Resp 16   LMP 03/09/2013   SpO2 98%   Gen: A&Ox3, no acute distress  CV: 2+ dp/pt pulses, capillary refill < 2sec  Resp: breathing equal and non-labored, no wheezing  MSK:       Spine:   Skin: Replaced left posterior incision with no drainage since dressing change yesterday     Sensation:      R       L    L3:   Intact   Intact    L4:   Intact   Intact    L5:   Intact   Intact    S1:   Intact   Intact     Motor:     R L    L3: Psoas    5  5    L4:   Quad    5  5    L4: TA   5 5    L5: EHL    5  5    S1: Eversion/PF  5  5        Hemoglobin   Date Value Ref Range Status   06/13/2023 13.4 11.7 - 15.7 g/dL Final   10/20/2022 13.0 11.7 - 15.7 g/dL Final   02/05/2018 13.6 11.7 - 15.7 g/dL Final   01/15/2015 9.9 (L) 11.7 - 15.7 g/dL Final        Assessment/Plan:  S/p 6/26/23 TLIF left L5-S1 for multiply recurrent disc herniation, complicated by incidental durotomy managed with fibrin sealant.  New onset RLE pain onset 6/28 which would not be expected based on the durotomy location.    -Activity: Bedrest, HOB 0 - 30 degrees if no headaches occurring.  No raising HOB past 30 degrees until MRI obtained and reviewed.  -DVT PPx: SCDs only in case return to OR is necessary  -Pain control: IV and PO, will start steroids and gabapentin   -Dressing: Monitor, notify MD of any significant drainage  -Diet: Ok for breakfast, then NPO until MRI obtained/reviewed    -Disposition: Pending hospital course      Torrey Levi MD  Orthopedic Spine  Surgery  [facility]

## 2023-06-29 NOTE — PROGRESS NOTES
MRI reviewed, demonstrates no evidence of RLE neurologic compression to explain the RLE symptoms.  No fluid collection concerning for ongoing CSF leak, question if intraoperative findings truly were consistent with durotomy given lack of appearance of classic clear fluid.    Ok with initiation of mobilization at this point.    - Regular diet  - Initiate raising HOB:    - 30 degrees for 2 hours    - 45 degrees for 2 hours    - 60 degrees for 2 hours    - Upright and mobilize to chair as able    Ok to stop and hold HOB progression at any point if having significant discomfort.      Torrey Levi MD  Orthopaedic Spine Surgery  [facility]

## 2023-06-29 NOTE — PROGRESS NOTES
A&Ox4. VSS/RA. Bedrest, HOB less than 30. Pain managed with IV dilaudid, refused PO dilaudid this shift. NPO starts 1030 for MRI. Incision CDI. Morales in place, no order to discontinue.

## 2023-06-29 NOTE — PROGRESS NOTES
[id number]  Alert and oriented x4. VSS, on RA. NPO for MRI today. HOB below 30 degrees. Lower back incision site CDI, dressing was charged by Dr. Levi. LLE numbness, RLE pain and spasms. Pain was managed with PRN IV and oral dilaudid.

## 2023-06-29 NOTE — PROGRESS NOTES
[id number]  Pt alert and oriented x 4, VSS, RA, denies SOB.tolerates regular diet.  HOB at 30 degrees now, to be increased by 15 degrees every 2 hours  as tolerated, the goal is 60 degress. Denies nausea, vomiting, dizziness and headache.Currently on bedrest,will ambulate as tolerated. Pain was managed with PRN Dilaudid.

## 2023-06-30 ENCOUNTER — APPOINTMENT (OUTPATIENT)
Dept: GENERAL RADIOLOGY | Facility: CLINIC | Age: 38
End: 2023-06-30
Attending: ORTHOPAEDIC SURGERY
Payer: COMMERCIAL

## 2023-06-30 ENCOUNTER — APPOINTMENT (OUTPATIENT)
Dept: PHYSICAL THERAPY | Facility: CLINIC | Age: 38
End: 2023-06-30
Attending: ORTHOPAEDIC SURGERY
Payer: COMMERCIAL

## 2023-06-30 ENCOUNTER — MEDICAL CORRESPONDENCE (OUTPATIENT)
Dept: HEALTH INFORMATION MANAGEMENT | Facility: CLINIC | Age: 38
End: 2023-06-30
Payer: COMMERCIAL

## 2023-06-30 ENCOUNTER — APPOINTMENT (OUTPATIENT)
Dept: ULTRASOUND IMAGING | Facility: CLINIC | Age: 38
End: 2023-06-30
Attending: ORTHOPAEDIC SURGERY
Payer: COMMERCIAL

## 2023-06-30 PROCEDURE — 250N000013 HC RX MED GY IP 250 OP 250 PS 637: Performed by: ORTHOPAEDIC SURGERY

## 2023-06-30 PROCEDURE — 258N000003 HC RX IP 258 OP 636: Performed by: STUDENT IN AN ORGANIZED HEALTH CARE EDUCATION/TRAINING PROGRAM

## 2023-06-30 PROCEDURE — 120N000001 HC R&B MED SURG/OB

## 2023-06-30 PROCEDURE — 999N000040 HC STATISTIC CONSULT NO CHARGE VASC ACCESS

## 2023-06-30 PROCEDURE — 93970 EXTREMITY STUDY: CPT

## 2023-06-30 PROCEDURE — 250N000011 HC RX IP 250 OP 636: Mod: JZ | Performed by: ORTHOPAEDIC SURGERY

## 2023-06-30 PROCEDURE — 97116 GAIT TRAINING THERAPY: CPT | Mod: GP | Performed by: PHYSICAL THERAPIST

## 2023-06-30 PROCEDURE — 97162 PT EVAL MOD COMPLEX 30 MIN: CPT | Mod: GP | Performed by: PHYSICAL THERAPIST

## 2023-06-30 PROCEDURE — 999N000128 HC STATISTIC PERIPHERAL IV START W/O US GUIDANCE

## 2023-06-30 PROCEDURE — 97530 THERAPEUTIC ACTIVITIES: CPT | Mod: GP | Performed by: PHYSICAL THERAPIST

## 2023-06-30 PROCEDURE — 72100 X-RAY EXAM L-S SPINE 2/3 VWS: CPT

## 2023-06-30 RX ORDER — AMOXICILLIN 250 MG
1 CAPSULE ORAL 2 TIMES DAILY
Qty: 14 TABLET | Refills: 0 | Status: SHIPPED | OUTPATIENT
Start: 2023-06-30 | End: 2023-07-07

## 2023-06-30 RX ORDER — SODIUM CHLORIDE 9 MG/ML
INJECTION, SOLUTION INTRAVENOUS CONTINUOUS
Status: DISCONTINUED | OUTPATIENT
Start: 2023-06-30 | End: 2023-07-06 | Stop reason: HOSPADM

## 2023-06-30 RX ORDER — HYDROXYZINE HYDROCHLORIDE 25 MG/1
25 TABLET, FILM COATED ORAL EVERY 6 HOURS PRN
Qty: 30 TABLET | Refills: 0 | Status: SHIPPED | OUTPATIENT
Start: 2023-06-30 | End: 2023-09-11

## 2023-06-30 RX ORDER — GABAPENTIN 300 MG/1
300 CAPSULE ORAL 3 TIMES DAILY
Qty: 90 CAPSULE | Refills: 0 | Status: SHIPPED | OUTPATIENT
Start: 2023-06-30 | End: 2024-08-02

## 2023-06-30 RX ORDER — HYDROMORPHONE HYDROCHLORIDE 2 MG/1
2 TABLET ORAL
Qty: 30 TABLET | Refills: 0 | Status: SHIPPED | OUTPATIENT
Start: 2023-06-30 | End: 2023-09-11

## 2023-06-30 RX ORDER — ENOXAPARIN SODIUM 100 MG/ML
40 INJECTION SUBCUTANEOUS EVERY 24 HOURS
Status: COMPLETED | OUTPATIENT
Start: 2023-06-30 | End: 2023-07-03

## 2023-06-30 RX ORDER — METHOCARBAMOL 750 MG/1
750 TABLET, FILM COATED ORAL EVERY 6 HOURS PRN
Qty: 30 TABLET | Refills: 0 | Status: SHIPPED | OUTPATIENT
Start: 2023-06-30 | End: 2023-09-11

## 2023-06-30 RX ADMIN — GABAPENTIN 300 MG: 300 CAPSULE ORAL at 22:02

## 2023-06-30 RX ADMIN — BUPROPION HYDROCHLORIDE 150 MG: 150 TABLET, FILM COATED, EXTENDED RELEASE ORAL at 10:11

## 2023-06-30 RX ADMIN — HYDROMORPHONE HYDROCHLORIDE 4 MG: 2 TABLET ORAL at 19:09

## 2023-06-30 RX ADMIN — GABAPENTIN 300 MG: 300 CAPSULE ORAL at 10:11

## 2023-06-30 RX ADMIN — DIAZEPAM 5 MG: 5 TABLET ORAL at 00:12

## 2023-06-30 RX ADMIN — ACETAMINOPHEN 975 MG: 325 TABLET, FILM COATED ORAL at 05:16

## 2023-06-30 RX ADMIN — HYDROMORPHONE HYDROCHLORIDE 0.4 MG: 0.2 INJECTION, SOLUTION INTRAMUSCULAR; INTRAVENOUS; SUBCUTANEOUS at 17:07

## 2023-06-30 RX ADMIN — DEXAMETHASONE SODIUM PHOSPHATE 4 MG: 4 INJECTION, SOLUTION INTRAMUSCULAR; INTRAVENOUS at 13:28

## 2023-06-30 RX ADMIN — SODIUM CHLORIDE: 9 INJECTION, SOLUTION INTRAVENOUS at 17:15

## 2023-06-30 RX ADMIN — DEXAMETHASONE SODIUM PHOSPHATE 4 MG: 4 INJECTION, SOLUTION INTRAMUSCULAR; INTRAVENOUS at 00:12

## 2023-06-30 RX ADMIN — SENNOSIDES AND DOCUSATE SODIUM 1 TABLET: 50; 8.6 TABLET ORAL at 10:11

## 2023-06-30 RX ADMIN — GABAPENTIN 300 MG: 300 CAPSULE ORAL at 16:18

## 2023-06-30 RX ADMIN — ACETAMINOPHEN 975 MG: 325 TABLET, FILM COATED ORAL at 16:18

## 2023-06-30 RX ADMIN — SENNOSIDES AND DOCUSATE SODIUM 1 TABLET: 50; 8.6 TABLET ORAL at 22:03

## 2023-06-30 RX ADMIN — METHOCARBAMOL TABLETS 750 MG: 750 TABLET, COATED ORAL at 10:11

## 2023-06-30 RX ADMIN — DEXAMETHASONE SODIUM PHOSPHATE 4 MG: 4 INJECTION, SOLUTION INTRAMUSCULAR; INTRAVENOUS at 05:17

## 2023-06-30 RX ADMIN — HYDROMORPHONE HYDROCHLORIDE 4 MG: 2 TABLET ORAL at 10:11

## 2023-06-30 RX ADMIN — HYDROMORPHONE HYDROCHLORIDE 4 MG: 2 TABLET ORAL at 14:36

## 2023-06-30 RX ADMIN — ENOXAPARIN SODIUM 40 MG: 40 INJECTION SUBCUTANEOUS at 19:10

## 2023-06-30 RX ADMIN — HYDROMORPHONE HYDROCHLORIDE 4 MG: 2 TABLET ORAL at 22:03

## 2023-06-30 RX ADMIN — METHOCARBAMOL TABLETS 750 MG: 750 TABLET, COATED ORAL at 16:18

## 2023-06-30 ASSESSMENT — ACTIVITIES OF DAILY LIVING (ADL)
CONCENTRATING,_REMEMBERING_OR_MAKING_DECISIONS_DIFFICULTY: NO
ADLS_ACUITY_SCORE: 35
ADLS_ACUITY_SCORE: 20
FALL_HISTORY_WITHIN_LAST_SIX_MONTHS: NO
ADLS_ACUITY_SCORE: 18
ADLS_ACUITY_SCORE: 35
ADLS_ACUITY_SCORE: 20
WEAR_GLASSES_OR_BLIND: NO
TOILETING_ISSUES: NO
DOING_ERRANDS_INDEPENDENTLY_DIFFICULTY: NO
ADLS_ACUITY_SCORE: 20
ADLS_ACUITY_SCORE: 35
ADLS_ACUITY_SCORE: 35
ADLS_ACUITY_SCORE: 20
CHANGE_IN_FUNCTIONAL_STATUS_SINCE_ONSET_OF_CURRENT_ILLNESS/INJURY: NO
ADLS_ACUITY_SCORE: 20
DIFFICULTY_EATING/SWALLOWING: NO
DRESSING/BATHING_DIFFICULTY: NO
ADLS_ACUITY_SCORE: 20
ADLS_ACUITY_SCORE: 20
WALKING_OR_CLIMBING_STAIRS_DIFFICULTY: NO

## 2023-06-30 NOTE — PROGRESS NOTES
LVM for TATIANNA Hdz, patient feels dehydrated and is hoping to get a bolus or infusion started back up.

## 2023-06-30 NOTE — PROGRESS NOTES
A&Ox4. VSS/RA. HOB 60, tolerated it well. Denied any headache, dizziness, or nausea. Incision site CDI, no leaks. Pain managed with PO dilaudid, valium, and ice. Did not want to ambulate tonight.

## 2023-06-30 NOTE — PROGRESS NOTES
Ortho Progress Note     Subjective:  MRI completed yesterday, shows no sign of CSF collection.  Able to raise HOB to 60 last evening with no pain, no headaches.  She notes it felt good to be able to sit upright. Wondering about being able to discharge today vs tomorrow.  RLE pain persistent, but significantly improved over past two days.    Objective:  /65 (BP Location: Right arm)   Pulse 61   Temp 97.7  F (36.5  C) (Oral)   Resp 16   LMP 03/09/2013   SpO2 97%   Gen: A&Ox3, no acute distress  CV: 2+ dp/pt pulses, capillary refill < 2sec  Resp: breathing equal and non-labored, no wheezing  MSK:       Spine:   Skin: Replaced left posterior incision with very minimal spotting along inferior aspect      Sensation:      R       L    L3:   Intact   Intact    L4:   Intact   Intact    L5:   Intact   Intact    S1:   Intact   Intact     Motor:     R L    L3: Psoas    5  5    L4:   Quad    5  5    L4: TA   5 5    L5: EHL    5  5    S1: Eversion/PF  5  5         1+ edema LLE    Hemoglobin   Date Value Ref Range Status   06/29/2023 11.2 (L) 11.7 - 15.7 g/dL Final   06/13/2023 13.4 11.7 - 15.7 g/dL Final   02/05/2018 13.6 11.7 - 15.7 g/dL Final   01/15/2015 9.9 (L) 11.7 - 15.7 g/dL Final        Assessment/Plan:  S/p 6/26/23 TLIF left L5-S1 for multiply recurrent disc herniation, complicated by suspected incidental durotomy managed with fibrin sealant.  New onset RLE pain onset 6/28 which would not be expected based on the durotomy location.    No return to OR planned.    -BLE ultrasound to evaluate for DVT given prolonged bedrest and LLE swelling    -DVT PPx:  Has been on SCDs only to date given prior potential need for return to OR.  Will initiate chemical anticoagulation if DVT demonstrated on ultrasound    -Activity:  No HOB limit, ok to mobilize and ambulate    -Pain control: Wean to PO  -Dressing: Monitor, notify MD of any significant drainage  -Diet: Regular    -Disposition: Likely home tomorrow      Torrey BERMUDEZ  MD Gurmeet  Orthopedic Spine Surgery  [facility]

## 2023-06-30 NOTE — PROGRESS NOTES
"   06/30/23 5308   Appointment Info   Signing Clinician's Name / Credentials (PT) Maria A Heaton, PT   Living Environment   People in Home child(beau), dependent  (Teenage daughters and patients best friend and her daughters also.)   Current Living Arrangements house   Home Accessibility stairs to enter home;stairs within home   Number of Stairs, Main Entrance 3   Stair Railings, Main Entrance none   Number of Stairs, Within Home, Primary greater than 10 stairs   Stair Railings, Within Home, Primary railing on right side (ascending)   Transportation Anticipated car, drives self   Living Environment Comments Patient independent an active without AD.   Self-Care   Usual Activity Tolerance excellent   Equipment Currently Used at Home none   Activity/Exercise/Self-Care Comment Has been limited with ex since last surgery.   General Information   Onset of Illness/Injury or Date of Surgery 06/28/23   Referring Physician Torrey Levi MD   Patient/Family Therapy Goals Statement (PT) Wanted to go home today but due to pain now says \"Never Mind\"   Pertinent History of Current Problem (include personal factors and/or comorbidities that impact the POC) S/p 6/26/23 TLIF left L5-S1 for multiply recurrent disc herniation, complicated by incidental durotomy managed with fibrin sealant.  New onset RLE pain. On bedrest until today.   Existing Precautions/Restrictions fall;spinal   General Observations Patient tearful and pushing cold pack into her right groin when therapist arrived. Agreeable to attempt eval.   Cognition   Affect/Mental Status (Cognition) WNL   Orientation Status (Cognition) oriented x 4   Follows Commands (Cognition) WNL   Pain Assessment   Patient Currently in Pain Yes, see Vital Sign flowsheet   Integumentary/Edema   Integumentary/Edema   (7-8 in right groin, back hurts also but right groin is worse.)   Posture    Posture Not impaired   Range of Motion (ROM)   ROM Comment Spine limited by precautions. Right hip limited " by pain but functional for sitting, transfers and amb.   Strength (Manual Muscle Testing)   Strength Comments Right LE limited by pain. Needed assist to lift right LE onto bed. Able to lift left LE onto bed.   Bed Mobility   Comment, (Bed Mobility) Min A for sup to sit and mod for sit to sup.   Transfers   Comment, (Transfers) Sit to stand with min A   Gait/Stairs (Locomotion)   Distance in Feet (Gait) 50+50   Comment, (Gait/Stairs) Gait with ww 5 feet in room with close CGA and cues for technique.   Balance   Balance Comments Good sitting balance. Good standing balance with support of ww.   Sensory Examination   Sensory Perception Comments Reports right middle 3 toes with altered sensation. This has been the case since prior surgery   Clinical Impression   Criteria for Skilled Therapeutic Intervention Yes, treatment indicated   PT Diagnosis (PT) Impaired functional independence   Influenced by the following impairments Pain, decreased LE strength due to pain.   Functional limitations due to impairments Needs assist for bed mobility, transfers and amb.   Clinical Presentation (PT Evaluation Complexity) Evolving/Changing   Clinical Presentation Rationale Pain fluctuating   Clinical Decision Making (Complexity) moderate complexity   Planned Therapy Interventions (PT) bed mobility training;gait training;transfer training   Anticipated Equipment Needs at Discharge (PT) walker, rolling  (Order placed.)   Risk & Benefits of therapy have been explained evaluation/treatment results reviewed;care plan/treatment goals reviewed;risks/benefits reviewed;current/potential barriers reviewed;participants voiced agreement with care plan;participants included;patient;friend   PT Total Evaluation Time   PT Xochitl, Moderate Complexity Minutes (39913) 12   Physical Therapy Goals   PT Frequency Daily   PT Predicted Duration/Target Date for Goal Attainment 07/04/23   PT Goals Bed Mobility;Transfers;Gait;Stairs;PT Goal 1   PT: Bed Mobility  Independent;Rolling;Supine to/from sit   PT: Transfers Modified independent;Sit to/from stand;Bed to/from chair;Assistive device   PT: Gait Modified independent;Rolling walker;Greater than 200 feet   PT: Stairs Modified independent;Greater than 10 stairs;Rail on right   PT: Goal 1 Patient will demonstrate knowledge of spine precautions by demonstrating them during all mobiltiy.   Interventions   Interventions Quick Adds Gait Training;Therapeutic Activity   Therapeutic Activity   Therapeutic Activities: dynamic activities to improve functional performance Minutes (14542) 6   Symptoms Noted During/After Treatment Fatigue;Increased pain   Treatment Detail/Skilled Intervention Patient educated on spine precautions. Fairly familiar due to prior surgeries. Did need cues for technique with sup to/from sit and needed cues for hand placement for sit to stand. Even with cues still tended to put hand on walker instead of pushing from sitting surface. Left in supine with needs close and bed alarm on.   Gait Training   Gait Training Minutes (17898) 25   Symptoms Noted During/After Treatment (Gait Training) increased pain;fatigue   Treatment Detail/Skilled Intervention Patient educated on technique with ww. Needed addtional cues for walker proximity. Min A initially progressing to SBA by end of session. Educated on technique for up/down steps using one rail. Educated right rail would work best since her left LE was the least painful now. Able to perform up/down 4 steps with close CGA of 1. Gait very slow and deliberate.   Physical Assistance Level (Gait Training) 2 person assist  (Progress to just SBA of 1.)   Weight Bearing (Gait Training) weight-bearing as tolerated   Stair Railings present on right side   Physical Assist/Nonphysical Assist (Stairs) 1 person assist   Level of [place] (Stairs) contact guard   PT Discharge Planning   PT Plan Progress gait with ww. Provide copy of proper posture hand out and review. Review  steps.   PT Discharge Recommendation (DC Rec) home with assist   PT Rationale for DC Rec By end of session, pateint was able to perform bed mobility with A of 1 and amb with ww with SBA of 1. Once pain under control (currently rating 7-8 even at rest) patient will be safe to discharge to home. Her friend lives with her and will be able to provide A with getting right LE into bed and assist with all heavy household tasks, meals, shopping etc. She will need a walker issued at discharge. Order in computer.   PT Brief overview of current status Up with A of 1 with ww.   Total Session Time   Timed Code Treatment Minutes 31   Total Session Time (sum of timed and untimed services) 43

## 2023-06-30 NOTE — PROGRESS NOTES
Pt is AOx4, VSS on RA, pain controlled with oral dilaudid and roboxin. Morales in place, good output. No IV access, vascular access consulted. New numbness in right groin area, Dr. Levi aware. Discharge pending.

## 2023-06-30 NOTE — PROGRESS NOTES
Notified provider about indwelling gonzalez catheter discussed removal or continued need.    Did provider choose to remove indwelling gonzalez catheter? No    Provider's gonzalez indication for keeping indwelling gonzalez catheter:Pain control and BLE numbness    Is there an order for indwelling gonzalez catheter? Yes    *If there is a plan to keep gonzalez catheter in place at discharge daily notification with provider is not necessary, but please add a notation in the treatment team sticky note that the patient will be discharging with the catheter.

## 2023-06-30 NOTE — PROGRESS NOTES
Understand patient having return of RLE pain limiting mobilization today. Given MRI with no concerning findings to explain, unsure of etiology. Will initiate Lovenox for DVT Ppx as no plan for return to OR.

## 2023-06-30 NOTE — PROGRESS NOTES
[id number]   Alert and oriented x4. VSS, on RA. Clear lung sounds bilaterally, denies SOB. Tolerating regular diet. HOB is now at 45 degrees, to be increased by 15 degrees q2hrs, the goal for HOB is 60 degrees. Pt denies nausea, headache, dizziness,  and has no leak of spinal fluid from incision dressing. Pain was managed with PRN dilaudid, valium and atarax. Still on bedrest, pt is to initiate ambulation with PT tomorrow.

## 2023-07-01 ENCOUNTER — APPOINTMENT (OUTPATIENT)
Dept: OCCUPATIONAL THERAPY | Facility: CLINIC | Age: 38
End: 2023-07-01
Attending: ORTHOPAEDIC SURGERY
Payer: COMMERCIAL

## 2023-07-01 ENCOUNTER — APPOINTMENT (OUTPATIENT)
Dept: PHYSICAL THERAPY | Facility: CLINIC | Age: 38
End: 2023-07-01
Attending: ORTHOPAEDIC SURGERY
Payer: COMMERCIAL

## 2023-07-01 PROCEDURE — 250N000013 HC RX MED GY IP 250 OP 250 PS 637: Performed by: ORTHOPAEDIC SURGERY

## 2023-07-01 PROCEDURE — 97165 OT EVAL LOW COMPLEX 30 MIN: CPT | Mod: GO | Performed by: OCCUPATIONAL THERAPIST

## 2023-07-01 PROCEDURE — 250N000011 HC RX IP 250 OP 636: Mod: JZ | Performed by: ORTHOPAEDIC SURGERY

## 2023-07-01 PROCEDURE — 97116 GAIT TRAINING THERAPY: CPT | Mod: GP | Performed by: PHYSICAL THERAPIST

## 2023-07-01 PROCEDURE — 120N000001 HC R&B MED SURG/OB

## 2023-07-01 PROCEDURE — 97530 THERAPEUTIC ACTIVITIES: CPT | Mod: GP | Performed by: PHYSICAL THERAPIST

## 2023-07-01 PROCEDURE — 97535 SELF CARE MNGMENT TRAINING: CPT | Mod: GO | Performed by: OCCUPATIONAL THERAPIST

## 2023-07-01 PROCEDURE — 258N000003 HC RX IP 258 OP 636: Performed by: STUDENT IN AN ORGANIZED HEALTH CARE EDUCATION/TRAINING PROGRAM

## 2023-07-01 RX ORDER — ENOXAPARIN SODIUM 100 MG/ML
40 INJECTION SUBCUTANEOUS EVERY 24 HOURS
Qty: 14 ML | Refills: 0 | Status: SHIPPED | OUTPATIENT
Start: 2023-07-01 | End: 2023-07-06

## 2023-07-01 RX ADMIN — DIAZEPAM 5 MG: 5 TABLET ORAL at 11:41

## 2023-07-01 RX ADMIN — HYDROMORPHONE HYDROCHLORIDE 0.4 MG: 0.2 INJECTION, SOLUTION INTRAMUSCULAR; INTRAVENOUS; SUBCUTANEOUS at 10:59

## 2023-07-01 RX ADMIN — GABAPENTIN 300 MG: 300 CAPSULE ORAL at 21:26

## 2023-07-01 RX ADMIN — ACETAMINOPHEN 975 MG: 325 TABLET, FILM COATED ORAL at 16:44

## 2023-07-01 RX ADMIN — METHOCARBAMOL TABLETS 750 MG: 750 TABLET, COATED ORAL at 00:14

## 2023-07-01 RX ADMIN — ACETAMINOPHEN 975 MG: 325 TABLET, FILM COATED ORAL at 08:50

## 2023-07-01 RX ADMIN — HYDROMORPHONE HYDROCHLORIDE 0.2 MG: 0.2 INJECTION, SOLUTION INTRAMUSCULAR; INTRAVENOUS; SUBCUTANEOUS at 18:21

## 2023-07-01 RX ADMIN — METHOCARBAMOL TABLETS 750 MG: 750 TABLET, COATED ORAL at 06:05

## 2023-07-01 RX ADMIN — ACETAMINOPHEN 975 MG: 325 TABLET, FILM COATED ORAL at 00:14

## 2023-07-01 RX ADMIN — DIAZEPAM 5 MG: 5 TABLET ORAL at 16:45

## 2023-07-01 RX ADMIN — ACETAMINOPHEN 650 MG: 325 TABLET, FILM COATED ORAL at 21:25

## 2023-07-01 RX ADMIN — METHOCARBAMOL TABLETS 750 MG: 750 TABLET, COATED ORAL at 18:21

## 2023-07-01 RX ADMIN — POLYETHYLENE GLYCOL 3350 17 G: 17 POWDER, FOR SOLUTION ORAL at 08:50

## 2023-07-01 RX ADMIN — GABAPENTIN 300 MG: 300 CAPSULE ORAL at 16:44

## 2023-07-01 RX ADMIN — METHOCARBAMOL TABLETS 750 MG: 750 TABLET, COATED ORAL at 12:36

## 2023-07-01 RX ADMIN — BUPROPION HYDROCHLORIDE 150 MG: 150 TABLET, FILM COATED, EXTENDED RELEASE ORAL at 08:50

## 2023-07-01 RX ADMIN — HYDROMORPHONE HYDROCHLORIDE 4 MG: 2 TABLET ORAL at 01:07

## 2023-07-01 RX ADMIN — SENNOSIDES AND DOCUSATE SODIUM 1 TABLET: 50; 8.6 TABLET ORAL at 08:50

## 2023-07-01 RX ADMIN — HYDROMORPHONE HYDROCHLORIDE 0.4 MG: 0.2 INJECTION, SOLUTION INTRAMUSCULAR; INTRAVENOUS; SUBCUTANEOUS at 21:17

## 2023-07-01 RX ADMIN — GABAPENTIN 300 MG: 300 CAPSULE ORAL at 08:50

## 2023-07-01 RX ADMIN — ENOXAPARIN SODIUM 40 MG: 40 INJECTION SUBCUTANEOUS at 16:45

## 2023-07-01 RX ADMIN — HYDROXYZINE HYDROCHLORIDE 25 MG: 25 TABLET, FILM COATED ORAL at 16:45

## 2023-07-01 RX ADMIN — SODIUM CHLORIDE: 9 INJECTION, SOLUTION INTRAVENOUS at 03:03

## 2023-07-01 RX ADMIN — SENNOSIDES AND DOCUSATE SODIUM 1 TABLET: 50; 8.6 TABLET ORAL at 21:26

## 2023-07-01 ASSESSMENT — ACTIVITIES OF DAILY LIVING (ADL)
ADLS_ACUITY_SCORE: 20

## 2023-07-01 NOTE — PROGRESS NOTES
Shift Summary [id number]    Admitting Diagnosis: S/P lumbar spinal fusion.    Patient A&Ox4. VSS on RA. CMS intact ex of R leg numbness and spasm/cramping at times. Atarax and valium given for R leg Spasm/cramping, pain and was  Not effective, 2hrs later PIV dilaudid and robaxin given. Up w/ 1GB.W. Voiding adequalty in the BR. Pending discharge plans per pain control.

## 2023-07-01 NOTE — PROGRESS NOTES
07/01/23 1100   Appointment Info   Signing Clinician's Name / Credentials (OT) Monika GoodOTR/L   Living Environment   People in Home child(beau), dependent  (Teenage daughters and patients best friend and her daughters also.)   Current Living Arrangements house   Home Accessibility stairs to enter home;stairs within home   Number of Stairs, Main Entrance 3   Stair Railings, Main Entrance none   Number of Stairs, Within Home, Primary greater than 10 stairs   Stair Railings, Within Home, Primary railing on right side (ascending)   Transportation Anticipated car, drives self;family or friend will provide   Living Environment Comments Patient independent an active without AD. Pt. reports walk-in shower, standard toilet w/vanity support   Self-Care   Usual Activity Tolerance excellent   Current Activity Tolerance moderate   Equipment Currently Used at Home none   Fall history within last six months no   Activity/Exercise/Self-Care Comment Pt.  indep. w/ I/ADl's;Has been limited with ex since last surgery. 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   Instrumental Activities of Daily Living (IADL)   IADL Comments friend/family will/able to assist   General Information   Onset of Illness/Injury or Date of Surgery 06/28/23   Referring Physician Torrey Levi MD   Patient/Family Therapy Goal Statement (OT) Plans to discharge home either today or tommorrow   Additional Occupational Profile Info/Pertinent History of Current Problem S/p 6/26/23 TLIF left L5-S1 for multiple recurrent disc herniation, complicated by incidental durotomy managed with fibrin sealant.  New onset RLE pain.   Performance Patterns (Routines, Roles, Habits) pt. indep.w/ I/ADl's PTA;pt. reports she was getting dressed on the floor following surgeries last fall.   Existing Precautions/Restrictions fall;spinal   Limitations/Impairments sensory  (baseline LE numbness/tingling)   General Observations and Info Pt. lying in bed, pain moderate  6-7/10;gonzalez just removed;agreeable to OT   Cognitive Status Examination   Orientation Status orientation to person, place and time   Cognitive Status Comments intact per observation/conversation   Visual Perception   Impact of Vision Impairment on Function (Vision) no siion problems noted/reported   Sensory   Sensory Comments numbness/.tingling BLE--baseline numbness LLE;pt. now reports R side numbness/tingling + increased pain RLE/groin   Pain Assessment   Patient Currently in Pain Yes, see Vital Sign flowsheet  (6-7/10)   Posture   Posture forward head position   Range of Motion Comprehensive   Comment, General Range of Motion BUE WNL/WFL   Strength Comprehensive (MMT)   Comment, General Manual Muscle Testing (MMT) Assessment BUE WNL/WFL   Coordination   Upper Extremity Coordination No deficits were identified   Bed Mobility   Comment (Bed Mobility) min. A   Transfers   Transfer Comments sit-stand CGA   Balance   Balance Comments impaired;overall SBA-CGA for room mobility using WW   Lower Body Dressing Assessment/Training   [place] Level (Lower Body Dressing) moderate assist (50% patient effort)   Grooming Assessment/Training   [place] Level (Grooming) contact guard assist   Toileting   [place] Level (Toileting) contact guard assist   Clinical Impression   Criteria for Skilled Therapeutic Interventions Met (OT) Yes, treatment indicated   OT Diagnosis Decline in ADL performance   OT Problem List-Impairments impacting ADL problems related to;activity tolerance impaired;balance;flexibility;mobility;range of motion (ROM);sensation;strength;pain;post-surgical precautions  (decreased trunk strength/ROM)   Assessment of Occupational Performance 3-5 Performance Deficits   Identified Performance Deficits dressing, bathing, toileting, standing I/ADL's, mobility   Planned Therapy Interventions (OT) ADL retraining   Clinical Decision Making Complexity (OT) low complexity   Anticipated Equipment Needs Upon  Discharge (OT) dressing equipment;raised toilet seat;reacher;shower chair   Risk & Benefits of therapy have been explained evaluation/treatment results reviewed;care plan/treatment goals reviewed;risks/benefits reviewed;current/potential barriers reviewed;participants voiced agreement with care plan;participants included;patient   OT Total Evaluation Time   OT Eval, Low Complexity Minutes (48143) 8   OT Goals   Therapy Frequency (OT) Daily   OT Predicted Duration/Target Date for Goal Attainment 07/03/23   OT Goals Hygiene/Grooming;Upper Body Dressing;Lower Body Dressing;Toilet Transfer/Toileting;Transfers   OT: Hygiene/Grooming independent;modified independent;within precautions;while standing   OT: Upper Body Dressing Independent;Modified independent;within precautions;including set-up/clothing retrieval   OT: Lower Body Dressing Independent;Modified independent;using adaptive equipment;within precautions;including set-up/clothing retrieval   OT: Transfer Supervision/stand-by assist;with assistive device  (shower transfer)   OT: Toilet Transfer/Toileting Independent;Modified independent;toilet transfer;using adaptive equipment;cleaning and garment management;within precautions   Interventions   Interventions Quick Adds Self-Care/Home Management   Self-Care/Home Management   Self-Care/Home Mgmt/ADL, Compensatory, Meal Prep Minutes (99556) 27   Symptoms Noted During/After Treatment (Meal Preparation/Planning Training) fatigue;increased pain   Treatment Detail/Skilled Intervention OT:Pt. lying in bed, reports recent increase in RLE/groin pain, now has subsided, willing to trial OOB;reviewed/ed. in spinal precautions when completing I/ADL's, issued spinal/body [place]. handout;Ed./reviewwed log-roll tech. for supine-sit;pt. able to come supine-sit w/SBA, no rail, moving slowly, utilizing good tech. overall;pt.able to come sit-stand w/ bed adjusted to comparable home bed height;completed w/ CGA/vc's/WW;pt. ambulated  to/from bathroom w/ CGA/WW, vc's for posture, moving slowly;pt. stood at sink to brush teeth, wash hands w/ SBA-CGA, ed. provided on WW/UE placment;pt. completed toielting w/ SBA, toilet trnasfer w/ CGA w/ simulated home set-up, good body mechs. throughout;pt. ambulated to recliner;pt. sat briefly, increased pain, spasm in back, RLE;pt. able to stand w/ CGA-min. A;ambulated a few steps to bed, decreased pain w/ standing;pt. completed log roll sit-supine w/ min. A to bring RLE into bed;bed alarm engaged, nursing updated.   OT Discharge Planning   OT Plan OT:full dressing w/ AE PRN, toileting/transfer, shower transfer   OT Discharge Recommendation (DC Rec) home with assist   OT Rationale for DC Rec Overall, pt. moving well, CGA for transfers, mobility using WW;pt. mainly limited by signifcant back + RLE/groin pain;anticipate w/ continued pain mgmt., progress, pt. will be able ot discharge home w/ assist from family as needed for I/ADl's.   OT Brief overview of current status SBA-CGA for bed mobility, transfers, standing tasks, toileting/transfer;pain=6-7/10, increased pain in RLE/groin reported, unable to tolerate sitting in chair

## 2023-07-01 NOTE — PLAN OF CARE
Goal Outcome Evaluation:       Patient is alert and oriented X4. VSS on RA with good sat. Up with assist of 1 with gait belt and walker. Patient encouraged to ambulate , patient unable to ambulates due to pain. On regular diet with good appetite. Patient feels dehydrated per patient and MD paged, order was give  IV bolus. NS infusing 100ml/hr . Patient report pain, prn oral dilaudid, robaxin and scheduled acetaminophen was given. Prn IV dilaudid was given for breakthrough pain.  Patient verbalized satisfaction with level of pain control. Numbness on RLE  and groin area per patient new since surgery.dressing dry and intact. Continent of bowel, gonzalez cather in place. Possible discharge home tomorrow if pain well control.

## 2023-07-01 NOTE — PROGRESS NOTES
Pt is AOx4, up A1GBW regular diet. IVF discontinued and gonzalez removed. Patient voiding well. One time dose of valium given to help with anxiety over muscle spasms. Pain controlled with oral roboxin and IV dilaudid x1. Discharge pending pain control.

## 2023-07-01 NOTE — PLAN OF CARE
Goal Outcome Evaluation:         Patient vital signs are at baseline: Yes, denies any new numbness or tingling, continues to have increased pain and weakness in the RLE, a/o x4  Patient able to ambulate as they were prior to admission or with assist devices provided by therapies during their stay:  Yes, up with 1 assist, gait belt and walker  Patient MUST void prior to discharge:  Yes, gonzalez cath to remain in place for retention   Patient able to tolerate oral intake:  Yes  Pain has adequate pain control using Oral analgesics:  Yes, pain well controlled with PRN/scheduled medications, rest, ice, and repositioning.   Does patient have an identified :  Yes  Has goal D/C date and time been discussed with patient:  Yes, will plan to discharge home when medically ready.

## 2023-07-01 NOTE — PROGRESS NOTES
Ortho Progress Note     Subjective:  MRI completed 6/29, with no sign of CSF collection.  xr 6/30 stable. No headaches.  She notes it felt good to be able to sit upright and move around some yesterday.  Discussed pulling gonzalez this am and seeing how she does moving around. If this goes well could go home today, if not will stay one more day.  RLE pain persistent but stable and ice is very helpful for this.  No new sharp or shooting pains.  No numbness or tingling.    Objective:  /76 (BP Location: Right arm)   Pulse 52   Temp 98  F (36.7  C) (Oral)   Resp 17   LMP 03/09/2013   SpO2 98%   Gen: A&Ox3, no acute distress  CV: 2+ dp/pt pulses, capillary refill < 2sec  Resp: breathing equal and non-labored, no wheezing  MSK:       Spine:   Skin: Replaced left posterior incision with very minimal spotting along inferior aspect      Sensation:      R       L    L3:   Intact   Intact    L4:   Intact   Intact    L5:   Intact   Intact    S1:   Intact   Intact     Motor:     R L    L3: Psoas    5  5    L4:   Quad    5  5    L4: TA   5 5    L5: EHL    5  5    S1: Eversion/PF  5  5         1+ edema LLE    Hemoglobin   Date Value Ref Range Status   06/29/2023 11.2 (L) 11.7 - 15.7 g/dL Final   06/13/2023 13.4 11.7 - 15.7 g/dL Final   02/05/2018 13.6 11.7 - 15.7 g/dL Final   01/15/2015 9.9 (L) 11.7 - 15.7 g/dL Final        Assessment/Plan:  S/p 6/26/23 TLIF left L5-S1 for multiply recurrent disc herniation, complicated by suspected incidental durotomy managed with fibrin sealant.  New onset RLE pain onset 6/28 which would not be expected based on the durotomy location.    -BLE ultrasounds completed to evaluate for DVT given prolonged bedrest and LLE swelling were NEGATIVE, therefore lovenox therapy initiated    -Activity:  No HOB limit, ok to mobilize and ambulate  - Drains: discontinue gonzalez today  -Pain control: Wean to PO  -Dressing: Monitor, notify MD of any significant drainage  -Diet: Regular    -Disposition: Likely  home today/zamzam based on mobility, voiding and pain today    Kjerstin Foss PA-C  [facility]

## 2023-07-02 ENCOUNTER — APPOINTMENT (OUTPATIENT)
Dept: CT IMAGING | Facility: CLINIC | Age: 38
End: 2023-07-02
Attending: STUDENT IN AN ORGANIZED HEALTH CARE EDUCATION/TRAINING PROGRAM
Payer: COMMERCIAL

## 2023-07-02 ENCOUNTER — APPOINTMENT (OUTPATIENT)
Dept: OCCUPATIONAL THERAPY | Facility: CLINIC | Age: 38
End: 2023-07-02
Attending: ORTHOPAEDIC SURGERY
Payer: COMMERCIAL

## 2023-07-02 ENCOUNTER — APPOINTMENT (OUTPATIENT)
Dept: PHYSICAL THERAPY | Facility: CLINIC | Age: 38
End: 2023-07-02
Attending: ORTHOPAEDIC SURGERY
Payer: COMMERCIAL

## 2023-07-02 PROCEDURE — 250N000013 HC RX MED GY IP 250 OP 250 PS 637: Performed by: ORTHOPAEDIC SURGERY

## 2023-07-02 PROCEDURE — 999N000128 HC STATISTIC PERIPHERAL IV START W/O US GUIDANCE

## 2023-07-02 PROCEDURE — 97116 GAIT TRAINING THERAPY: CPT | Mod: GP

## 2023-07-02 PROCEDURE — 72131 CT LUMBAR SPINE W/O DYE: CPT

## 2023-07-02 PROCEDURE — 250N000011 HC RX IP 250 OP 636: Mod: JZ | Performed by: ORTHOPAEDIC SURGERY

## 2023-07-02 PROCEDURE — 97535 SELF CARE MNGMENT TRAINING: CPT | Mod: GO

## 2023-07-02 PROCEDURE — 120N000001 HC R&B MED SURG/OB

## 2023-07-02 RX ADMIN — HYDROXYZINE HYDROCHLORIDE 25 MG: 25 TABLET, FILM COATED ORAL at 16:25

## 2023-07-02 RX ADMIN — HYDROMORPHONE HYDROCHLORIDE 0.4 MG: 0.2 INJECTION, SOLUTION INTRAMUSCULAR; INTRAVENOUS; SUBCUTANEOUS at 13:41

## 2023-07-02 RX ADMIN — HYDROMORPHONE HYDROCHLORIDE 0.4 MG: 0.2 INJECTION, SOLUTION INTRAMUSCULAR; INTRAVENOUS; SUBCUTANEOUS at 11:00

## 2023-07-02 RX ADMIN — METHOCARBAMOL TABLETS 750 MG: 750 TABLET, COATED ORAL at 09:20

## 2023-07-02 RX ADMIN — GABAPENTIN 300 MG: 300 CAPSULE ORAL at 21:52

## 2023-07-02 RX ADMIN — BUPROPION HYDROCHLORIDE 150 MG: 150 TABLET, FILM COATED, EXTENDED RELEASE ORAL at 09:20

## 2023-07-02 RX ADMIN — GABAPENTIN 300 MG: 300 CAPSULE ORAL at 16:20

## 2023-07-02 RX ADMIN — DIAZEPAM 5 MG: 5 TABLET ORAL at 20:48

## 2023-07-02 RX ADMIN — METHOCARBAMOL TABLETS 750 MG: 750 TABLET, COATED ORAL at 22:27

## 2023-07-02 RX ADMIN — GABAPENTIN 300 MG: 300 CAPSULE ORAL at 09:20

## 2023-07-02 RX ADMIN — SENNOSIDES AND DOCUSATE SODIUM 1 TABLET: 50; 8.6 TABLET ORAL at 20:48

## 2023-07-02 RX ADMIN — HYDROMORPHONE HYDROCHLORIDE 0.4 MG: 0.2 INJECTION, SOLUTION INTRAMUSCULAR; INTRAVENOUS; SUBCUTANEOUS at 18:42

## 2023-07-02 RX ADMIN — DIAZEPAM 5 MG: 5 TABLET ORAL at 00:39

## 2023-07-02 RX ADMIN — HYDROXYZINE HYDROCHLORIDE 25 MG: 25 TABLET, FILM COATED ORAL at 22:27

## 2023-07-02 RX ADMIN — HYDROMORPHONE HYDROCHLORIDE 0.4 MG: 0.2 INJECTION, SOLUTION INTRAMUSCULAR; INTRAVENOUS; SUBCUTANEOUS at 21:48

## 2023-07-02 RX ADMIN — METHOCARBAMOL TABLETS 750 MG: 750 TABLET, COATED ORAL at 16:25

## 2023-07-02 RX ADMIN — DIAZEPAM 5 MG: 5 TABLET ORAL at 12:46

## 2023-07-02 RX ADMIN — SENNOSIDES AND DOCUSATE SODIUM 1 TABLET: 50; 8.6 TABLET ORAL at 09:20

## 2023-07-02 RX ADMIN — ENOXAPARIN SODIUM 40 MG: 40 INJECTION SUBCUTANEOUS at 17:16

## 2023-07-02 ASSESSMENT — ACTIVITIES OF DAILY LIVING (ADL)
ADLS_ACUITY_SCORE: 20

## 2023-07-02 NOTE — PROGRESS NOTES
Pt is AOx4, Up with A1 GBW (will need walker order at discharge), VSS on RA. Numbness and weakness in RLE getting worse, provider ordered CT scan. Patient requiring IV dilaudid, roboxin and valium to help with cramping and pain. Good intake and output. Waiting on CT results to determine next steps regarding discharge.

## 2023-07-02 NOTE — PROGRESS NOTES
Dr Lackey noted ongoing symptoms in right L5 distribution, but developed new EHL weakness today.  CT scan was ordered for further clarity on source of right L5 radiculopathy.    CT demonstrates severe foraminal stenosis on the right with L5 root compression due to extension through the L5-S1 level causing the S1 SAP to close down the foramen.      I called and discussed this with Nel.  I noted the options available for management would be further observation, trial of an TALISHA, or return to the OR for foraminotomy right L5-S1.  She is noting symptoms are not improving with time and medication to date, and she is actually noting worsening of her radicular pain and EHL weakness.  She did not have a good experience with prior TALISHA and would like to avoid another one.  She therefore noted her preference for return to the OR for foraminotomy right L5-S1.    I noted the first availability would be Wednesday, and she was understanding.  Will therefore plan for foraminotomy right L5-S1 on Wednesday 7/5.    Torrey Levi MD  Orthopaedic Spine Surgery  [facility]

## 2023-07-02 NOTE — PLAN OF CARE
Goal Outcome Evaluation:         Patient vital signs are at baseline: Yes, denies any new numbness or tingling, a/o x4.  Patient able to ambulate as they were prior to admission or with assist devices provided by therapies during their stay:  Yes, up with 1 assist, gait belt and walker  Patient MUST void prior to discharge:  Yes  Patient able to tolerate oral intake:  Yes  Pain has adequate pain control using Oral analgesics:  Yes, pain well managed with PRN medication, rest and ice.   Does patient have an identified :  Yes  Has goal D/C date and time been discussed with patient:  Yes, discharge home when medically ready.

## 2023-07-02 NOTE — PLAN OF CARE
Goal Outcome Evaluation:    AOx4, VSS on RA. Excruciating pain after walking to BR managed with PRN IV dilaudid, tylenol, and schedule medications.  Patient was tearful in pain. Ambulating with  A1GBW.  Regular diet. Patient voiding well. Discharge pending pain control.

## 2023-07-02 NOTE — PROGRESS NOTES
Ortho Progress Note     Subjective:  Pain is decent but the spasms continue and these are hard to control.   Patient reports weakness in the right lower extremity and numbness along the L5 dermatomal distribution, this is not progressing that she notes.  She reports that the weakness that started early yesterday has stayed consistent over the last 24 hours.  She reports that the pain is still around the thigh and groin and low back on the right.  She reports that the numbness goes all the way down her leg.  Reports that the left lower extremity is doing much better post surgery.  No acute events overnight.    Objective:  /71 (BP Location: Right arm)   Pulse 54   Temp 97.8  F (36.6  C) (Oral)   Resp 16   LMP 03/09/2013   SpO2 100%   Gen: A&Ox3, no acute distress  CV: 2+ dp/pt pulses, capillary refill < 2sec  Resp: breathing equal and non-labored, no wheezing  MSK:       Spine:   Skin: Replaced left posterior incision with very minimal spotting along inferior aspect      Sensation:      R       L    L3:   Intact   Intact    L4:   Intact   Intact    L5:   Altered             Intact    S1:   Intact   Intact     Motor:     R L    L3: Psoas    5  5    L4:   Quad    5  5    L4: TA   5 5    L5: EHL    3  5    S1: Eversion/PF  5  5         1+ edema LLE    Hemoglobin   Date Value Ref Range Status   06/29/2023 11.2 (L) 11.7 - 15.7 g/dL Final   06/13/2023 13.4 11.7 - 15.7 g/dL Final   02/05/2018 13.6 11.7 - 15.7 g/dL Final   01/15/2015 9.9 (L) 11.7 - 15.7 g/dL Final      CT 7/2: IMPRESSION:  1.  Post surgical changes at L5-S1 as described above. This includes nonspecific soft tissue air at the left hemilaminectomy bed. Abscess not excluded.  2.  Multilevel disc degeneration and facet hypertrophy without high-grade spinal canal stenosis. Severe bilateral neural foraminal stenosis at L5-S1.    Assessment/Plan:  S/p 6/26/23 TLIF left L5-S1 for multiply recurrent disc herniation, complicated by suspected incidental  durotomy managed with fibrin sealant.  New onset RLE weakness in EHl.  Will continue to monitor this closely.    -Activity:   Ok to mobilize and ambulate  - Drains: dcd  -Pain control: Wean to PO  -Dressing: Mostly c/d/i  -Diet: Regular  -Disposition: plan for return to OR on Wednesday for foraminotomy R L5-S1, last day of lovenox today pending surgery    Kjerstin Foss PA-C [facility]

## 2023-07-03 PROCEDURE — 120N000001 HC R&B MED SURG/OB

## 2023-07-03 PROCEDURE — 250N000013 HC RX MED GY IP 250 OP 250 PS 637: Performed by: ORTHOPAEDIC SURGERY

## 2023-07-03 PROCEDURE — 250N000011 HC RX IP 250 OP 636: Mod: JZ | Performed by: ORTHOPAEDIC SURGERY

## 2023-07-03 RX ADMIN — DIAZEPAM 5 MG: 5 TABLET ORAL at 02:57

## 2023-07-03 RX ADMIN — DIAZEPAM 5 MG: 5 TABLET ORAL at 09:25

## 2023-07-03 RX ADMIN — BUPROPION HYDROCHLORIDE 150 MG: 150 TABLET, FILM COATED, EXTENDED RELEASE ORAL at 08:03

## 2023-07-03 RX ADMIN — HYDROMORPHONE HYDROCHLORIDE 0.4 MG: 0.2 INJECTION, SOLUTION INTRAMUSCULAR; INTRAVENOUS; SUBCUTANEOUS at 14:56

## 2023-07-03 RX ADMIN — SENNOSIDES AND DOCUSATE SODIUM 1 TABLET: 50; 8.6 TABLET ORAL at 22:49

## 2023-07-03 RX ADMIN — METHOCARBAMOL TABLETS 750 MG: 750 TABLET, COATED ORAL at 20:45

## 2023-07-03 RX ADMIN — DIAZEPAM 5 MG: 5 TABLET ORAL at 23:35

## 2023-07-03 RX ADMIN — HYDROMORPHONE HYDROCHLORIDE 0.4 MG: 0.2 INJECTION, SOLUTION INTRAMUSCULAR; INTRAVENOUS; SUBCUTANEOUS at 03:00

## 2023-07-03 RX ADMIN — GABAPENTIN 300 MG: 300 CAPSULE ORAL at 16:24

## 2023-07-03 RX ADMIN — METHOCARBAMOL TABLETS 750 MG: 750 TABLET, COATED ORAL at 05:57

## 2023-07-03 RX ADMIN — DIAZEPAM 5 MG: 5 TABLET ORAL at 17:36

## 2023-07-03 RX ADMIN — HYDROMORPHONE HYDROCHLORIDE 0.4 MG: 0.2 INJECTION, SOLUTION INTRAMUSCULAR; INTRAVENOUS; SUBCUTANEOUS at 18:45

## 2023-07-03 RX ADMIN — ENOXAPARIN SODIUM 40 MG: 40 INJECTION SUBCUTANEOUS at 22:48

## 2023-07-03 RX ADMIN — METHOCARBAMOL TABLETS 750 MG: 750 TABLET, COATED ORAL at 14:56

## 2023-07-03 RX ADMIN — HYDROMORPHONE HYDROCHLORIDE 0.4 MG: 0.2 INJECTION, SOLUTION INTRAMUSCULAR; INTRAVENOUS; SUBCUTANEOUS at 11:37

## 2023-07-03 RX ADMIN — SENNOSIDES AND DOCUSATE SODIUM 1 TABLET: 50; 8.6 TABLET ORAL at 08:03

## 2023-07-03 RX ADMIN — GABAPENTIN 300 MG: 300 CAPSULE ORAL at 22:49

## 2023-07-03 RX ADMIN — GABAPENTIN 300 MG: 300 CAPSULE ORAL at 08:03

## 2023-07-03 RX ADMIN — HYDROMORPHONE HYDROCHLORIDE 0.4 MG: 0.2 INJECTION, SOLUTION INTRAMUSCULAR; INTRAVENOUS; SUBCUTANEOUS at 08:03

## 2023-07-03 RX ADMIN — HYDROMORPHONE HYDROCHLORIDE 0.4 MG: 0.2 INJECTION, SOLUTION INTRAMUSCULAR; INTRAVENOUS; SUBCUTANEOUS at 22:51

## 2023-07-03 RX ADMIN — ACETAMINOPHEN 650 MG: 325 TABLET, FILM COATED ORAL at 23:57

## 2023-07-03 ASSESSMENT — ACTIVITIES OF DAILY LIVING (ADL)
ADLS_ACUITY_SCORE: 20

## 2023-07-03 NOTE — PROGRESS NOTES
Ortho Progress Note     Subjective:  Pain is decent but the spasms continue and these are hard to control.   Patient reports weakness in the right lower extremity and numbness along the L5 dermatomal distribution, this is not progressing that she notes.  She reports that the weakness that started early yesterday has stayed consistent over the last 24 hours.  She reports that the pain is still around the thigh and groin and low back on the right.  She reports that the numbness goes all the way down her leg.  Reports that the left lower extremity is doing much better post surgery.  No acute events overnight.    Objective:  BP 91/53 (BP Location: Right arm, Patient Position: Supine, Cuff Size: Adult Regular)   Pulse 72   Temp 97.9  F (36.6  C) (Oral)   Resp 19   LMP 03/09/2013   SpO2 98%   Gen: A&Ox3, no acute distress  CV: 2+ dp/pt pulses, capillary refill < 2sec  Resp: breathing equal and non-labored, no wheezing  MSK:       Spine:   Skin: Replaced left posterior incision with very minimal spotting along inferior aspect      Sensation:      R       L    L3:   Intact   Intact    L4:   Intact   Intact    L5:   Altered             Intact    S1:   Intact   Intact     Motor:     R L    L3: Psoas    5  5    L4:   Quad    5  5    L4: TA   5 5    L5: EHL    3  5    S1: Eversion/PF  5  5         1+ edema LLE    Hemoglobin   Date Value Ref Range Status   06/29/2023 11.2 (L) 11.7 - 15.7 g/dL Final   06/13/2023 13.4 11.7 - 15.7 g/dL Final   02/05/2018 13.6 11.7 - 15.7 g/dL Final   01/15/2015 9.9 (L) 11.7 - 15.7 g/dL Final      CT 7/2: IMPRESSION:  1.  Post surgical changes at L5-S1 as described above. This includes nonspecific soft tissue air at the left hemilaminectomy bed. Abscess not excluded.  2.  Multilevel disc degeneration and facet hypertrophy without high-grade spinal canal stenosis. Severe bilateral neural foraminal stenosis at L5-S1.    Assessment/Plan:  S/p 6/26/23 TLIF left L5-S1 for multiply recurrent disc  herniation, complicated by suspected incidental durotomy managed with fibrin sealant.  New onset RLE weakness in EHL, has not progressed based on exam this am and patient report.  Will continue to monitor this closely, right now plan is to return to OR on Wednesday with Dr. Levi    -Activity:   Ok to mobilize and ambulate  - Drains: dcd  -Pain control: Wean to PO  -Dressing: Mostly c/d/i  -Diet: Regular  -Disposition: plan for return to OR on Wednesday for foraminotomy R L5-S1, last day of lovenox today pending surgery    Kjerstin Foss PA-C [facility]

## 2023-07-03 NOTE — PLAN OF CARE
Summary: 6/26 TLIF L5-S1 , foraminectomy on Wednesday for ongoing and worsening RLE weakness/pain/spasms  Orientation: A&Ox4  Diet: Regular  Activity: A1 w/gb  Vitals: VSS RA  PIV: New PIV placed this shift, SL  GI/: cont  Respiratory: WNL  Skin: lumbar incision, dried drainage  Abnormal labs: CRP 21.92  Procedure or tests: CT of lower spine done today  Plan: Surgery Wednesday. Pain control for now   Other details: pt having severe pain from spasms. Dilaudid, Valium, Robaxin, Atarax, and ice for pain. Robaxin and atarax seem to be most effective this shift. Still endorses severe pain during spasms

## 2023-07-03 NOTE — PROGRESS NOTES
Pt is AOx4, Up SBA with walker, VSS on RA. Patient requiring IV dilaudid, roboxin and valium to help with cramping and pain. Good intake and output. Patient will return to OR on Wednesday 7/5/23 for a foraminotomy with Dr. Levi. Discharge is pending.

## 2023-07-03 NOTE — PLAN OF CARE
Goal Outcome Evaluation:         Vitally stable, denies any new numbness or tingling, a/o x4. Pain continues to be an issue but PRN medications is helping. SBA with gait belt and walker. Will be returning to the OR on Wednesday. Discharge planning on hold.

## 2023-07-04 PROCEDURE — 250N000011 HC RX IP 250 OP 636: Mod: JZ | Performed by: ORTHOPAEDIC SURGERY

## 2023-07-04 PROCEDURE — 250N000013 HC RX MED GY IP 250 OP 250 PS 637: Performed by: ORTHOPAEDIC SURGERY

## 2023-07-04 PROCEDURE — 120N000001 HC R&B MED SURG/OB

## 2023-07-04 RX ADMIN — HYDROMORPHONE HYDROCHLORIDE 0.4 MG: 0.2 INJECTION, SOLUTION INTRAMUSCULAR; INTRAVENOUS; SUBCUTANEOUS at 19:15

## 2023-07-04 RX ADMIN — SENNOSIDES AND DOCUSATE SODIUM 1 TABLET: 50; 8.6 TABLET ORAL at 09:23

## 2023-07-04 RX ADMIN — GABAPENTIN 300 MG: 300 CAPSULE ORAL at 09:23

## 2023-07-04 RX ADMIN — HYDROMORPHONE HYDROCHLORIDE 2 MG: 2 TABLET ORAL at 05:56

## 2023-07-04 RX ADMIN — HYDROXYZINE HYDROCHLORIDE 25 MG: 25 TABLET, FILM COATED ORAL at 21:10

## 2023-07-04 RX ADMIN — METHOCARBAMOL TABLETS 750 MG: 750 TABLET, COATED ORAL at 19:15

## 2023-07-04 RX ADMIN — HYDROMORPHONE HYDROCHLORIDE 4 MG: 2 TABLET ORAL at 12:48

## 2023-07-04 RX ADMIN — HYDROMORPHONE HYDROCHLORIDE 4 MG: 2 TABLET ORAL at 22:10

## 2023-07-04 RX ADMIN — METHOCARBAMOL TABLETS 750 MG: 750 TABLET, COATED ORAL at 05:56

## 2023-07-04 RX ADMIN — DIAZEPAM 5 MG: 5 TABLET ORAL at 09:54

## 2023-07-04 RX ADMIN — HYDROXYZINE HYDROCHLORIDE 25 MG: 25 TABLET, FILM COATED ORAL at 00:47

## 2023-07-04 RX ADMIN — SENNOSIDES AND DOCUSATE SODIUM 1 TABLET: 50; 8.6 TABLET ORAL at 21:10

## 2023-07-04 RX ADMIN — HYDROMORPHONE HYDROCHLORIDE 2 MG: 2 TABLET ORAL at 00:47

## 2023-07-04 RX ADMIN — BUPROPION HYDROCHLORIDE 150 MG: 150 TABLET, FILM COATED, EXTENDED RELEASE ORAL at 09:23

## 2023-07-04 RX ADMIN — GABAPENTIN 300 MG: 300 CAPSULE ORAL at 16:43

## 2023-07-04 RX ADMIN — ACETAMINOPHEN 650 MG: 325 TABLET, FILM COATED ORAL at 05:56

## 2023-07-04 RX ADMIN — GABAPENTIN 300 MG: 300 CAPSULE ORAL at 21:10

## 2023-07-04 RX ADMIN — ACETAMINOPHEN 650 MG: 325 TABLET, FILM COATED ORAL at 19:15

## 2023-07-04 ASSESSMENT — ACTIVITIES OF DAILY LIVING (ADL)
ADLS_ACUITY_SCORE: 20

## 2023-07-04 NOTE — PROGRESS NOTES
Ortho Progress Note     Subjective:  Pain is decent but the spasms continue and these are hard to control.   Patient reports weakness in the right lower extremity and numbness along the L5 dermatomal distribution, this is not progressing that she notes. Pain is still around the thigh and groin and low back on the right.  She reports that the numbness goes all the way down her leg.  Reports that the left lower extremity is doing much better post surgery.  EHL weakness has improved, she thinks likely to not moving much over the last day.    Objective:  /57 (BP Location: Right arm)   Pulse 63   Temp 97.9  F (36.6  C) (Oral)   Resp 16   LMP 03/09/2013   SpO2 99%   Gen: A&Ox3, no acute distress  CV: 2+ dp/pt pulses, capillary refill < 2sec  Resp: breathing equal and non-labored, no wheezing  MSK:       Spine:   Skin: Replaced left posterior incision with very minimal spotting along inferior aspect      Sensation:      R       L    L3:   Intact   Intact    L4:   Intact   Intact    L5:   Altered             Intact    S1:   Intact   Intact     Motor:     R L    L3: Psoas    5  5    L4:   Quad    5  5    L4: TA   5 5    L5: EHL    4  5    S1: Eversion/PF  5  5         1+ edema LLE    Hemoglobin   Date Value Ref Range Status   06/29/2023 11.2 (L) 11.7 - 15.7 g/dL Final   06/13/2023 13.4 11.7 - 15.7 g/dL Final   02/05/2018 13.6 11.7 - 15.7 g/dL Final   01/15/2015 9.9 (L) 11.7 - 15.7 g/dL Final      CT 7/2: IMPRESSION:  1.  Post surgical changes at L5-S1 as described above. This includes nonspecific soft tissue air at the left hemilaminectomy bed. Abscess not excluded.  2.  Multilevel disc degeneration and facet hypertrophy without high-grade spinal canal stenosis. Severe bilateral neural foraminal stenosis at L5-S1.    Assessment/Plan:  S/p 6/26/23 TLIF left L5-S1 for multiply recurrent disc herniation, complicated by suspected incidental durotomy managed with fibrin sealant.  New onset RLE weakness in EHL, has not  progressed based on exam this am and patient report.  Will continue to monitor this closely, right now plan is to return to OR on Wednesday with Dr. Levi    -Activity:   Ok to mobilize and ambulate sparingly  - Drains: dcd  -Pain control: Wean to PO  -Dressing: Mostly c/d/i  -Diet: Regular, until MN  -Disposition: plan for return to OR tomorrow, NPO at midnight    Kjerstin Foss PA-C [facility]

## 2023-07-04 NOTE — PLAN OF CARE
Goal Outcome Evaluation:      Plan of Care Reviewed With: patient        Patient vital signs are at baseline: Yes  Patient able to ambulate as they were prior to admission or with assist devices provided by therapies during their stay:  No,  Reason: assist of 2 w/ GB&W to get back to bed after ambulating to bathroom x1 this shift - normally is SBA   Patient MUST void prior to discharge:  Yes  Patient able to tolerate oral intake:  Yes  Pain has adequate pain control using Oral analgesics:  No,  Reason:  IV dilaudid for breakthrough pain x1 at 2300 hrs, effective. Have since been using PO  pain meds   Does patient have an identified :  No,  Reason:  TBD   Has goal D/C date and time been discussed with patient:  No,  Reason:  TBD          AOx4, VSS on RA, denies chest pain, SOB, reg diet, numbness in BLEs especially RLE. Pt did have one episode when she got up to use the bathroom SBA, then was unable to move RLE after washing hands in sink. Assist of 2 was required to safely assist pt back to bed.

## 2023-07-05 ENCOUNTER — APPOINTMENT (OUTPATIENT)
Dept: GENERAL RADIOLOGY | Facility: CLINIC | Age: 38
End: 2023-07-05
Attending: ORTHOPAEDIC SURGERY
Payer: COMMERCIAL

## 2023-07-05 ENCOUNTER — ANESTHESIA EVENT (OUTPATIENT)
Dept: SURGERY | Facility: CLINIC | Age: 38
End: 2023-07-05
Payer: COMMERCIAL

## 2023-07-05 ENCOUNTER — ANESTHESIA (OUTPATIENT)
Dept: SURGERY | Facility: CLINIC | Age: 38
End: 2023-07-05
Payer: COMMERCIAL

## 2023-07-05 PROCEDURE — 710N000009 HC RECOVERY PHASE 1, LEVEL 1, PER MIN: Performed by: ORTHOPAEDIC SURGERY

## 2023-07-05 PROCEDURE — 250N000011 HC RX IP 250 OP 636: Mod: JZ | Performed by: STUDENT IN AN ORGANIZED HEALTH CARE EDUCATION/TRAINING PROGRAM

## 2023-07-05 PROCEDURE — 250N000009 HC RX 250: Performed by: ORTHOPAEDIC SURGERY

## 2023-07-05 PROCEDURE — 360N000084 HC SURGERY LEVEL 4 W/ FLUORO, PER MIN: Performed by: ORTHOPAEDIC SURGERY

## 2023-07-05 PROCEDURE — 250N000011 HC RX IP 250 OP 636: Mod: JZ | Performed by: ORTHOPAEDIC SURGERY

## 2023-07-05 PROCEDURE — 272N000001 HC OR GENERAL SUPPLY STERILE: Performed by: ORTHOPAEDIC SURGERY

## 2023-07-05 PROCEDURE — 250N000011 HC RX IP 250 OP 636: Performed by: ANESTHESIOLOGY

## 2023-07-05 PROCEDURE — 250N000011 HC RX IP 250 OP 636: Performed by: ORTHOPAEDIC SURGERY

## 2023-07-05 PROCEDURE — 01NB0ZZ RELEASE LUMBAR NERVE, OPEN APPROACH: ICD-10-PCS | Performed by: ORTHOPAEDIC SURGERY

## 2023-07-05 PROCEDURE — 120N000001 HC R&B MED SURG/OB

## 2023-07-05 PROCEDURE — 250N000025 HC SEVOFLURANE, PER MIN: Performed by: ORTHOPAEDIC SURGERY

## 2023-07-05 PROCEDURE — 250N000011 HC RX IP 250 OP 636: Mod: JZ | Performed by: REGISTERED NURSE

## 2023-07-05 PROCEDURE — 250N000011 HC RX IP 250 OP 636: Performed by: REGISTERED NURSE

## 2023-07-05 PROCEDURE — 250N000013 HC RX MED GY IP 250 OP 250 PS 637: Performed by: STUDENT IN AN ORGANIZED HEALTH CARE EDUCATION/TRAINING PROGRAM

## 2023-07-05 PROCEDURE — 999N000141 HC STATISTIC PRE-PROCEDURE NURSING ASSESSMENT: Performed by: ORTHOPAEDIC SURGERY

## 2023-07-05 PROCEDURE — 250N000013 HC RX MED GY IP 250 OP 250 PS 637: Performed by: ORTHOPAEDIC SURGERY

## 2023-07-05 PROCEDURE — 370N000017 HC ANESTHESIA TECHNICAL FEE, PER MIN: Performed by: ORTHOPAEDIC SURGERY

## 2023-07-05 PROCEDURE — 999N000179 XR SURGERY CARM FLUORO LESS THAN 5 MIN W STILLS

## 2023-07-05 PROCEDURE — 258N000003 HC RX IP 258 OP 636: Performed by: REGISTERED NURSE

## 2023-07-05 PROCEDURE — 258N000003 HC RX IP 258 OP 636: Performed by: ANESTHESIOLOGY

## 2023-07-05 PROCEDURE — 250N000009 HC RX 250: Performed by: REGISTERED NURSE

## 2023-07-05 RX ORDER — BUPIVACAINE HYDROCHLORIDE AND EPINEPHRINE 5; 5 MG/ML; UG/ML
INJECTION, SOLUTION PERINEURAL PRN
Status: DISCONTINUED | OUTPATIENT
Start: 2023-07-05 | End: 2023-07-05 | Stop reason: HOSPADM

## 2023-07-05 RX ORDER — CEFAZOLIN SODIUM/WATER 2 G/20 ML
2 SYRINGE (ML) INTRAVENOUS SEE ADMIN INSTRUCTIONS
Status: DISCONTINUED | OUTPATIENT
Start: 2023-07-05 | End: 2023-07-05 | Stop reason: HOSPADM

## 2023-07-05 RX ORDER — ONDANSETRON 4 MG/1
4 TABLET, ORALLY DISINTEGRATING ORAL EVERY 30 MIN PRN
Status: DISCONTINUED | OUTPATIENT
Start: 2023-07-05 | End: 2023-07-05 | Stop reason: HOSPADM

## 2023-07-05 RX ORDER — METHYLPREDNISOLONE ACETATE 40 MG/ML
INJECTION, SUSPENSION INTRA-ARTICULAR; INTRALESIONAL; INTRAMUSCULAR; SOFT TISSUE PRN
Status: DISCONTINUED | OUTPATIENT
Start: 2023-07-05 | End: 2023-07-05 | Stop reason: HOSPADM

## 2023-07-05 RX ORDER — ONDANSETRON 2 MG/ML
INJECTION INTRAMUSCULAR; INTRAVENOUS PRN
Status: DISCONTINUED | OUTPATIENT
Start: 2023-07-05 | End: 2023-07-05

## 2023-07-05 RX ORDER — CEFAZOLIN SODIUM/WATER 2 G/20 ML
2 SYRINGE (ML) INTRAVENOUS
Status: COMPLETED | OUTPATIENT
Start: 2023-07-05 | End: 2023-07-05

## 2023-07-05 RX ORDER — SODIUM CHLORIDE, SODIUM LACTATE, POTASSIUM CHLORIDE, CALCIUM CHLORIDE 600; 310; 30; 20 MG/100ML; MG/100ML; MG/100ML; MG/100ML
INJECTION, SOLUTION INTRAVENOUS CONTINUOUS
Status: DISCONTINUED | OUTPATIENT
Start: 2023-07-05 | End: 2023-07-05 | Stop reason: HOSPADM

## 2023-07-05 RX ORDER — HYDROMORPHONE HYDROCHLORIDE 1 MG/ML
INJECTION, SOLUTION INTRAMUSCULAR; INTRAVENOUS; SUBCUTANEOUS PRN
Status: DISCONTINUED | OUTPATIENT
Start: 2023-07-05 | End: 2023-07-05

## 2023-07-05 RX ORDER — EPHEDRINE SULFATE 50 MG/ML
INJECTION, SOLUTION INTRAMUSCULAR; INTRAVENOUS; SUBCUTANEOUS PRN
Status: DISCONTINUED | OUTPATIENT
Start: 2023-07-05 | End: 2023-07-05

## 2023-07-05 RX ORDER — FENTANYL CITRATE 0.05 MG/ML
50 INJECTION, SOLUTION INTRAMUSCULAR; INTRAVENOUS EVERY 5 MIN PRN
Status: DISCONTINUED | OUTPATIENT
Start: 2023-07-05 | End: 2023-07-05 | Stop reason: HOSPADM

## 2023-07-05 RX ORDER — LIDOCAINE HYDROCHLORIDE 20 MG/ML
INJECTION, SOLUTION INFILTRATION; PERINEURAL PRN
Status: DISCONTINUED | OUTPATIENT
Start: 2023-07-05 | End: 2023-07-05

## 2023-07-05 RX ORDER — PROPOFOL 10 MG/ML
INJECTION, EMULSION INTRAVENOUS CONTINUOUS PRN
Status: DISCONTINUED | OUTPATIENT
Start: 2023-07-05 | End: 2023-07-05

## 2023-07-05 RX ORDER — MAGNESIUM HYDROXIDE 1200 MG/15ML
LIQUID ORAL PRN
Status: DISCONTINUED | OUTPATIENT
Start: 2023-07-05 | End: 2023-07-05 | Stop reason: HOSPADM

## 2023-07-05 RX ORDER — HYDROMORPHONE HCL IN WATER/PF 6 MG/30 ML
0.4 PATIENT CONTROLLED ANALGESIA SYRINGE INTRAVENOUS EVERY 5 MIN PRN
Status: DISCONTINUED | OUTPATIENT
Start: 2023-07-05 | End: 2023-07-05 | Stop reason: HOSPADM

## 2023-07-05 RX ORDER — DEXAMETHASONE SODIUM PHOSPHATE 4 MG/ML
INJECTION, SOLUTION INTRA-ARTICULAR; INTRALESIONAL; INTRAMUSCULAR; INTRAVENOUS; SOFT TISSUE PRN
Status: DISCONTINUED | OUTPATIENT
Start: 2023-07-05 | End: 2023-07-05

## 2023-07-05 RX ORDER — ONDANSETRON 2 MG/ML
4 INJECTION INTRAMUSCULAR; INTRAVENOUS EVERY 30 MIN PRN
Status: DISCONTINUED | OUTPATIENT
Start: 2023-07-05 | End: 2023-07-05 | Stop reason: HOSPADM

## 2023-07-05 RX ORDER — HYDROMORPHONE HCL IN WATER/PF 6 MG/30 ML
0.2 PATIENT CONTROLLED ANALGESIA SYRINGE INTRAVENOUS EVERY 5 MIN PRN
Status: DISCONTINUED | OUTPATIENT
Start: 2023-07-05 | End: 2023-07-05 | Stop reason: HOSPADM

## 2023-07-05 RX ORDER — PROPOFOL 10 MG/ML
INJECTION, EMULSION INTRAVENOUS PRN
Status: DISCONTINUED | OUTPATIENT
Start: 2023-07-05 | End: 2023-07-05

## 2023-07-05 RX ORDER — FENTANYL CITRATE 0.05 MG/ML
25 INJECTION, SOLUTION INTRAMUSCULAR; INTRAVENOUS EVERY 5 MIN PRN
Status: DISCONTINUED | OUTPATIENT
Start: 2023-07-05 | End: 2023-07-05 | Stop reason: HOSPADM

## 2023-07-05 RX ORDER — FENTANYL CITRATE 50 UG/ML
INJECTION, SOLUTION INTRAMUSCULAR; INTRAVENOUS PRN
Status: DISCONTINUED | OUTPATIENT
Start: 2023-07-05 | End: 2023-07-05

## 2023-07-05 RX ORDER — ACETAMINOPHEN 325 MG/1
975 TABLET ORAL ONCE
Status: COMPLETED | OUTPATIENT
Start: 2023-07-05 | End: 2023-07-05

## 2023-07-05 RX ADMIN — GABAPENTIN 300 MG: 300 CAPSULE ORAL at 09:43

## 2023-07-05 RX ADMIN — FENTANYL CITRATE 100 MCG: 50 INJECTION, SOLUTION INTRAMUSCULAR; INTRAVENOUS at 17:29

## 2023-07-05 RX ADMIN — ONDANSETRON 4 MG: 2 INJECTION INTRAMUSCULAR; INTRAVENOUS at 18:41

## 2023-07-05 RX ADMIN — ROCURONIUM BROMIDE 40 MG: 50 INJECTION, SOLUTION INTRAVENOUS at 17:29

## 2023-07-05 RX ADMIN — PROPOFOL 140 MG: 10 INJECTION, EMULSION INTRAVENOUS at 17:29

## 2023-07-05 RX ADMIN — Medication 2 G: at 17:24

## 2023-07-05 RX ADMIN — ACETAMINOPHEN 650 MG: 325 TABLET, FILM COATED ORAL at 01:16

## 2023-07-05 RX ADMIN — Medication 7.5 MG: at 18:06

## 2023-07-05 RX ADMIN — FENTANYL CITRATE 50 MCG: 50 INJECTION, SOLUTION INTRAMUSCULAR; INTRAVENOUS at 19:41

## 2023-07-05 RX ADMIN — SODIUM CHLORIDE, POTASSIUM CHLORIDE, SODIUM LACTATE AND CALCIUM CHLORIDE: 600; 310; 30; 20 INJECTION, SOLUTION INTRAVENOUS at 17:13

## 2023-07-05 RX ADMIN — METHOCARBAMOL TABLETS 750 MG: 750 TABLET, COATED ORAL at 09:44

## 2023-07-05 RX ADMIN — ACETAMINOPHEN 975 MG: 325 TABLET, FILM COATED ORAL at 17:02

## 2023-07-05 RX ADMIN — LIDOCAINE HYDROCHLORIDE 100 MG: 20 INJECTION, SOLUTION INFILTRATION; PERINEURAL at 17:29

## 2023-07-05 RX ADMIN — PHENYLEPHRINE HYDROCHLORIDE 100 MCG: 10 INJECTION INTRAVENOUS at 18:27

## 2023-07-05 RX ADMIN — GABAPENTIN 300 MG: 300 CAPSULE ORAL at 21:19

## 2023-07-05 RX ADMIN — SENNOSIDES AND DOCUSATE SODIUM 1 TABLET: 50; 8.6 TABLET ORAL at 09:43

## 2023-07-05 RX ADMIN — PHENYLEPHRINE HYDROCHLORIDE 100 MCG: 10 INJECTION INTRAVENOUS at 18:21

## 2023-07-05 RX ADMIN — BUPROPION HYDROCHLORIDE 150 MG: 150 TABLET, FILM COATED, EXTENDED RELEASE ORAL at 09:43

## 2023-07-05 RX ADMIN — ACETAMINOPHEN 650 MG: 325 TABLET, FILM COATED ORAL at 05:10

## 2023-07-05 RX ADMIN — Medication 5 MG: at 18:51

## 2023-07-05 RX ADMIN — PHENYLEPHRINE HYDROCHLORIDE 100 MCG: 10 INJECTION INTRAVENOUS at 18:15

## 2023-07-05 RX ADMIN — HYDROMORPHONE HYDROCHLORIDE 4 MG: 2 TABLET ORAL at 01:16

## 2023-07-05 RX ADMIN — GABAPENTIN 300 MG: 300 CAPSULE ORAL at 15:13

## 2023-07-05 RX ADMIN — HYDROMORPHONE HYDROCHLORIDE 4 MG: 2 TABLET ORAL at 05:10

## 2023-07-05 RX ADMIN — HYDROMORPHONE HYDROCHLORIDE 0.4 MG: 0.2 INJECTION, SOLUTION INTRAMUSCULAR; INTRAVENOUS; SUBCUTANEOUS at 12:05

## 2023-07-05 RX ADMIN — PHENYLEPHRINE HYDROCHLORIDE 100 MCG: 10 INJECTION INTRAVENOUS at 18:02

## 2023-07-05 RX ADMIN — HYDROMORPHONE HYDROCHLORIDE 0.4 MG: 0.2 INJECTION, SOLUTION INTRAMUSCULAR; INTRAVENOUS; SUBCUTANEOUS at 15:12

## 2023-07-05 RX ADMIN — METHOCARBAMOL TABLETS 750 MG: 750 TABLET, COATED ORAL at 01:16

## 2023-07-05 RX ADMIN — DIAZEPAM 5 MG: 5 TABLET ORAL at 14:07

## 2023-07-05 RX ADMIN — Medication 7.5 MG: at 18:09

## 2023-07-05 RX ADMIN — DEXAMETHASONE SODIUM PHOSPHATE 10 MG: 4 INJECTION, SOLUTION INTRA-ARTICULAR; INTRALESIONAL; INTRAMUSCULAR; INTRAVENOUS; SOFT TISSUE at 17:45

## 2023-07-05 RX ADMIN — MIDAZOLAM 2 MG: 1 INJECTION INTRAMUSCULAR; INTRAVENOUS at 17:24

## 2023-07-05 RX ADMIN — HYDROMORPHONE HYDROCHLORIDE 0.25 MG: 1 INJECTION, SOLUTION INTRAMUSCULAR; INTRAVENOUS; SUBCUTANEOUS at 19:10

## 2023-07-05 RX ADMIN — Medication 5 MG: at 18:36

## 2023-07-05 RX ADMIN — HYDROMORPHONE HYDROCHLORIDE 0.25 MG: 1 INJECTION, SOLUTION INTRAMUSCULAR; INTRAVENOUS; SUBCUTANEOUS at 19:06

## 2023-07-05 RX ADMIN — PHENYLEPHRINE HYDROCHLORIDE 0.25 MCG/KG/MIN: 10 INJECTION INTRAVENOUS at 18:31

## 2023-07-05 RX ADMIN — HYDROMORPHONE HYDROCHLORIDE 0.4 MG: 0.2 INJECTION, SOLUTION INTRAMUSCULAR; INTRAVENOUS; SUBCUTANEOUS at 21:19

## 2023-07-05 RX ADMIN — SENNOSIDES AND DOCUSATE SODIUM 1 TABLET: 50; 8.6 TABLET ORAL at 21:19

## 2023-07-05 RX ADMIN — DIAZEPAM 5 MG: 5 TABLET ORAL at 21:19

## 2023-07-05 RX ADMIN — PROPOFOL 50 MCG/KG/MIN: 10 INJECTION, EMULSION INTRAVENOUS at 17:35

## 2023-07-05 ASSESSMENT — ACTIVITIES OF DAILY LIVING (ADL)
ADLS_ACUITY_SCORE: 21
ADLS_ACUITY_SCORE: 21
ADLS_ACUITY_SCORE: 20
ADLS_ACUITY_SCORE: 25
ADLS_ACUITY_SCORE: 25
ADLS_ACUITY_SCORE: 21
ADLS_ACUITY_SCORE: 25
ADLS_ACUITY_SCORE: 21
ADLS_ACUITY_SCORE: 21
ADLS_ACUITY_SCORE: 25

## 2023-07-05 ASSESSMENT — LIFESTYLE VARIABLES: TOBACCO_USE: 0

## 2023-07-05 ASSESSMENT — ENCOUNTER SYMPTOMS: SEIZURES: 0

## 2023-07-05 NOTE — ANESTHESIA PREPROCEDURE EVALUATION
Anesthesia Pre-Procedure Evaluation    Patient: Nel Durán   MRN: [id number] : 1985        Procedure : Procedure(s):  Right lumbar 5 to sacral 1 foraminotomy          Past Medical History:   Diagnosis Date     ADD (attention deficit disorder)      ASCUS favor benign 2014    neg hpv cotest in 3 yrs     depression 2000    Not currently on meds     Depressive disorder      History of blood transfusion 2013    6 units after hysterectomy     Menarche age 12     Pelvic pain       Past Surgical History:   Procedure Laterality Date     BACK SURGERY  10/2022, 2022    L4,5,S1     BIOPSY       BLADDER SURGERY       C/SECTION, LOW TRANSVERSE        x2     CERVICECTOMY (TRACHELECTOMY) N/A 2015    not done     COLONOSCOPY  2014    Procedure: COMBINED COLONOSCOPY, SINGLE BIOPSY/POLYPECTOMY BY BIOPSY;  Surgeon: Elizabeth Shepard MD;  Location: [facility]     CYSTOSCOPY  2013    Procedure: CYSTOSCOPY;;  Surgeon: Mariel Smith MD;  [facility]     DAVINCI NEPHRECTOMY PARTIAL Left 2022    Procedure: NEPHRECTOMY, PARTIAL, ROBOT-ASSISTED, LAPAROSCOPIC WITH INTRAOPERATIVE ULTRASOUND;  Surgeon: Matthew Aranda MD;  Location: [facility]     LABIALPLASTY  2013    Procedure: LABIALPLASTY;;  Surgeon: Leticia Staley MD;  [facility]     LAPAROSCOPIC HYSTERECTOMY SUPRACERVICAL  2013    simple hyperplasiaProcedure: LAPAROSCOPIC HYSTERECTOMY SUPRACERVICAL;  Laparoscopic Supracervical Hysterectomy, left labialplasty;  Surgeon: Leticia Staley MD;  [facility]     LAPAROSCOPIC LYSIS ADHESIONS N/A 2015    Procedure: LAPAROSCOPIC LYSIS ADHESIONS;  Surgeon: Sue Johnston MD;  [facility]     LAPAROSCOPIC TUBAL LIGATION  2013    Procedure: LAPAROSCOPIC TUBAL LIGATION;  Bilateral Laparoscopic Tubal Ligation With Intrauterine Device Removal ;  Surgeon: Jaime Patrick MD;  [facility]     LAPAROSCOPY DIAGNOSTIC (GYN)   04/05/2013    Procedure: LAPAROSCOPY DIAGNOSTIC (GYN);;  Surgeon: Mariel Smith MD;  [facility]     LAPAROSCOPY OPERATIVE ADULT N/A 01/09/2015    Procedure: LAPAROSCOPY OPERATIVE ADULT;  Surgeon: Sue Johnston MD;  [facility]     LAPAROTOMY EXPLORATORY  04/05/2013    Procedure: LAPAROTOMY EXPLORATORY;  ligasure of pedicles and cervical stump;  Surgeon: Mariel Smith MD;  [facility]      Allergies   Allergen Reactions     Gluten Meal Other (See Comments)     Intolerance     Iodine      Topical application     Liquid Adhesive Other (See Comments)     DERMABOND- caused skin sloughing      Social History     Tobacco Use     Smoking status: Never     Smokeless tobacco: Never   Substance Use Topics     Alcohol use: No     Comment: social      Wt Readings from Last 1 Encounters:   07/05/23 94.8 kg (209 lb)        Anesthesia Evaluation   Pt has had prior anesthetic.     No history of anesthetic complications       ROS/MED HX  ENT/Pulmonary:    (-) tobacco use, asthma and sleep apnea   Neurologic:    (-) no seizures and no CVA   Cardiovascular:       METS/Exercise Tolerance:     Hematologic:       Musculoskeletal: Comment: S/p back surgery      GI/Hepatic:    (-) GERD   Renal/Genitourinary:       Endo:    (-) Type II DM and thyroid disease   Psychiatric/Substance Use:       Infectious Disease:       Malignancy:       Other:            Physical Exam    Airway        Mallampati: II   TM distance: > 3 FB   Neck ROM: full   Mouth opening: > 3 cm    Respiratory Devices and Support         Dental       (+) Completely normal teeth      Cardiovascular   cardiovascular exam normal          Pulmonary   pulmonary exam normal                OUTSIDE LABS:  CBC:   Lab Results   Component Value Date    WBC 6.2 06/29/2023    WBC 7.8 06/13/2023    HGB 11.2 (L) 06/29/2023    HGB 13.4 06/13/2023    HCT 34.8 (L) 06/29/2023    HCT 40.0 06/13/2023     06/29/2023     06/13/2023     BMP:   Lab  Results   Component Value Date     06/29/2023     06/13/2023    POTASSIUM 4.1 06/29/2023    POTASSIUM 3.7 06/13/2023    CHLORIDE 107 06/29/2023    CHLORIDE 103 06/13/2023    CO2 22 06/29/2023    CO2 23 06/13/2023    BUN 7.5 06/29/2023    BUN 11.2 06/13/2023    CR 0.72 06/29/2023    CR 0.89 06/13/2023     (H) 06/29/2023     (H) 06/13/2023     COAGS:   Lab Results   Component Value Date    PTT 36 01/15/2015    INR 1.22 (H) 03/27/2018    FIBR 611 (H) 01/15/2015     POC:   Lab Results   Component Value Date     (H) 01/10/2015    HCG Negative 01/24/2022     HEPATIC:   Lab Results   Component Value Date    ALBUMIN 3.8 03/04/2022    PROTTOTAL 7.5 03/04/2022    ALT 23 03/04/2022    AST 15 03/04/2022    ALKPHOS 59 03/04/2022    BILITOTAL 0.2 03/04/2022     OTHER:   Lab Results   Component Value Date    LACT 0.7 12/29/2021    CADEN 8.2 (L) 06/29/2023    MAG 1.8 04/09/2013    LIPASE 21 09/20/2010    TSH 1.40 01/30/2013    CRP <2.9 08/18/2017    SED 19 06/29/2023       Anesthesia Plan    ASA Status:  2      Anesthesia Type: General.     - Airway: ETT   Induction: Intravenous.   Maintenance: Balanced.   Techniques and Equipment:     - Airway: Video-Laryngoscope         Consents    Anesthesia Plan(s) and associated risks, benefits, and realistic alternatives discussed. Questions answered and patient/representative(s) expressed understanding.    - Discussed:     - Discussed with:  Patient         Postoperative Care    Pain management: IV analgesics, Oral pain medications.   PONV prophylaxis: Ondansetron (or other 5HT-3), Dexamethasone or Solumedrol, Background Propofol Infusion     Comments:                Bettie Campuzano

## 2023-07-05 NOTE — PROGRESS NOTES
Ortho Progress Note     Subjective:  Pain is controlled at rest, but spikes in right L5 distribution with any motion.  Hasn't been mobilizing very well as a result.  Continues to have  right L5 sensation, though strength has improved some.    Objective:  /68 (BP Location: Right arm)   Pulse 66   Temp 97.9  F (36.6  C) (Oral)   Resp 16   LMP 2013   SpO2 98%   Gen: A&Ox3, no acute distress  CV: 2+ dp/pt pulses, capillary refill < 2sec  Resp: breathing equal and non-labored, no wheezing  MSK:       Spine:     Sensation:      R       L    L3:   Intact   Intact    L4:   Intact   Intact    L5:   Decreased Intact    S1:   Intact   Intact     Motor:     R L    L3: Psoas    5  5    L4:   Quad    5  5    L4: TA   4 5    L5: EHL    4  5    S1: Eversion/PF  5  5        Hemoglobin   Date Value Ref Range Status   2023 11.2 (L) 11.7 - 15.7 g/dL Final   2023 13.4 11.7 - 15.7 g/dL Final   2018 13.6 11.7 - 15.7 g/dL Final   01/15/2015 9.9 (L) 11.7 - 15.7 g/dL Final        Assessment/Plan:  S/p 23 TLIF left L5-S1 for multiply recurrent disc herniation, complicated by suspected incidental durotomy managed with fibrin sealant.  New onset RLE weakness in EHL, now mild TA weakness new this AM as well.  Source is right L5-S1 foraminal stenosis which was asymptomatic preoperatively.    Plan to return to OR today for foraminotomy right L5-S1    - DVT PPx:  Holding Lovenox for OR  -Diet: NPO for OR today  -Disposition: Return to OR today for foraminotomy R L5-S1.  If mobilizing well afterwards with good pain control, plan on discharge Thursday vs Friday.      Torrey Levi MD  Orthopaedic Spine Surgery  [facility]

## 2023-07-05 NOTE — PLAN OF CARE
Goal Outcome Evaluation:  Shift: [id number] 7/4/2023    Orientation: AOx4    Vitals/Tele VSS RA    Pain: Pain has been managed with IV Dilaudid x1; Tylenol; Robaxin; Atarax; PO Dilaudid.     IV Access/drains: R PIV SL    Diet: Regular     Mobility: Assist x1 w/GBW; She is pretty limited due to Pain. She has been having spasms, avoiding movement as much as possible.     GI/: Continent of B and B    Wound/Skin: Surgical incisions lower back.     Consults: None this shift.    Discharge Plan: TBD; she is having another surgery tomorrow.       See Flow sheets for assessment

## 2023-07-05 NOTE — PROGRESS NOTES
Pt is AOx4, up SBA, IV dilaudid/roboxin and valium given for pain and muscle control. CHG baths completed. Patient went to OR around 1630.

## 2023-07-05 NOTE — PLAN OF CARE
A&Ox4. VSS on RA. Back pain managed w/ Tylenol, Dilaudid & Robaxin. CMS intact except unchanged BLE numbness. PIV SL. NPO since 0000. Up Ax1 gb/walker. Plan to return to OR later today w/ Dr. Levi.

## 2023-07-05 NOTE — OP NOTE
Orthopedic Surgery Operative Report    Patient:   Nel Durán  MRN:   [id number]   :  1985  Facility: [facility]   Date:  23         PREOPERATIVE DIAGNOSIS:    Foraminal stenosis right L5-S1 with radiculopathy    POSTOPERATIVE DIAGNOSIS:    Foraminal stenosis right L5-S1 with radiculopathy    PROCEDURE PERFORMED:    Foraminotomies right L5-S1    SURGEON:    Torrey Levi MD    SURGICAL ASSISTANT:    Iris Hdz PA-C     ANESTHESIA:  General    FINDINGS:    Severe foraminal stenosis right L5-S1 with the SAP of S1 impinging upon the exiting L5 nerve root    COMPLICATIONS:     None    SPECIMENS:    None    ESTIMATED BLOOD LOSS:  5 cc    IMPLANTS:    None    INDICATION FOR OPERATION:  The patient is a 38 year old female who on 2023 underwent an L5-S1 TLIF for a multiple recurrent left L5-S1 disc herniation with associated radiculopathy.  The surgery was complicated by a suspected durotomy which was managed with fibrin sealant and the patient was placed on flat bedrest initially.  She overall progressed well with regards to the suspected durotomy, however she developed severe right lower extremity radicular pain which did not match one clear nerve root pattern.  Given the severity of her symptoms, she was transferred from the outpatient setting to the hospital and further imaging was obtained.  Initially the MRI did not clearly delineate the source of her symptoms, however she subsequent developed right EHL weakness and the CT scan was obtained which did demonstrate severe foraminal stenosis at right L5-S1.  As conservative measures were not effective in controlling her symptoms I discussed with her the risks, benefits, and alternatives of the above operation and she wished to proceed.    DESCRIPTION OF PROCEDURE:    The patient was met in the preoperative holding area and the operative site was confirmed and marked.  We once again reviewed the risks, benefits,  and alternatives of the operation and the patient wished to proceed with the surgery.    The patient was taken back to the operating room and induced under general anesthesia.  The patient was positioned prone on a Campos frame with all bony prominences well padded.  The surgical site was prepped and draped in the usual standard fashion.    A spinal needle was used for level localization with fluoroscopy.  Once I had localized the appropriate skin incision site for the approach to the operative level, a skin incision was made and dissection carried down to the myofascia.  I created a small opening within the myofascia right of the spinous processes.  I next introduced the first dilator from the [employer] system through this hole in the myofascia, and guided it down to the right L5 pars interarticularis.  I sequentially dilated up to a 22 mm tubular retractor.  I locked the tubular retractor into place, and then once again confirmed radiographically that it was docked at the right L5 pars level.      I resected soft tissue overlying the pars, and then established my bony landmarks, including the facet complex superior to the pars.  I created a rectangular window using a Midas marcia from lateral to medial in the pars just below the level of the L4-5 facet complex to the cranial aspect of the pars.  As I burred down, I resected the IAP of L5 to arrive down on the SAP of S1.  This was thinned out until I had a thin shelf of bone left within the foramen.  I then defined the border of the SAP with a curved curette, and used the curved curettes to break this thin shelf of bone up and away from the L5 nerve root.  Following this process there was dramatically more room available within the foramen which had been severely stenotic prior with that shelf of SAP bone directly impinging upon the exiting nerve root.  I resected the remaining bone overlying the exiting nerve root with Kerrison rongeurs.  I passed the  ball-tipped nerve hook through the foramen and confirmed that there was no ongoing compression.    I achieved final hemostasis of the deep structures with bipolar cautery and Floseal.  No ongoing sites of bleeding were present.  I placed a gelfoam sponge over the neural elements soaked in 40 mg Depo-Medrol.  At this point I began to withdraw my tubular retractor very slowly through each tissue layer, taking care to bipolar all active sites of bleeding as I withdrew.  When the tube was removed, there were no remaining active sites of bleeding present as confirmed by visualization of the gelfoam sugar which was still uncolored by blood at the base of the operative site.    The deep fascia was closed with a running 0-vicyl suture in a watertight fashion, and the subcutaneous tissues were closed with 2-0 vicryl interrupted sutures.  Exofin and steri strips were applied.  The patient was transferred off of the operative table and allowed to emerge from anesthesia.  The patient was extubated and transported to PACU in stable condition.        A surgical assistant was critical for this case to assist in retraction of the soft tissues and evacuating blood from the surgical field to facilitate a safer operation with improved visualization and less time under anesthesia.     All sponge and needle counts were correct at case conclusion.      POSTOPERATIVE PLAN:  -Activity:    Up with assist  -Weight Bearing Status:  WBAT   -Bracing:   None  -Antibiotics:     Ancef x24h  -Anticoagulation:   May restart Lovenox POD 2  -Pain control:    IV and PO, wean to PO as able  -Dressing:    Ok to shower with dressings in place, dressings ok to get wet.  -Diet:     ADAT    -Disposition:    Pending pain, PT, anticipate discharge around POD 1-2  -Follow up:     2 weeks in clinic        Torrey Levi MD

## 2023-07-05 NOTE — ANESTHESIA PROCEDURE NOTES
Airway       Patient location during procedure: OR       Procedure Start/Stop Times: 7/5/2023 5:33 PM  Staff -        CRNA: Foreign [place]isle APRN CRNA       Performed By: CRNA  Consent for Airway        Urgency: elective  Indications and Patient Condition       Indications for airway management: isaac-procedural       Induction type:intravenous       Mask difficulty assessment: 1 - vent by mask    Final Airway Details       Final airway type: endotracheal airway       Successful airway: ETT - single  Endotracheal Airway Details        ETT size (mm): 7.0       Cuffed: yes       Successful intubation technique: direct laryngoscopy       DL Blade Type: MAC 3       Grade View of Cords: 1       Adjucts: stylet       Position: Right       Measured from: gums/teeth       Secured at (cm): 21       Bite block used: None    Post intubation assessment        Placement verified by: capnometry, equal breath sounds and chest rise        Number of attempts at approach: 1       Number of other approaches attempted: 0       Secured with: pink tape       Ease of procedure: easy       Dentition: Intact and Unchanged    Medication(s) Administered   Medication Administration Time: 7/5/2023 5:33 PM

## 2023-07-06 ENCOUNTER — APPOINTMENT (OUTPATIENT)
Dept: PHYSICAL THERAPY | Facility: CLINIC | Age: 38
End: 2023-07-06
Attending: ORTHOPAEDIC SURGERY
Payer: COMMERCIAL

## 2023-07-06 VITALS
OXYGEN SATURATION: 98 % | SYSTOLIC BLOOD PRESSURE: 121 MMHG | BODY MASS INDEX: 34.78 KG/M2 | TEMPERATURE: 97.8 F | WEIGHT: 209 LBS | DIASTOLIC BLOOD PRESSURE: 70 MMHG | HEART RATE: 98 BPM | RESPIRATION RATE: 16 BRPM

## 2023-07-06 PROCEDURE — 97530 THERAPEUTIC ACTIVITIES: CPT | Mod: GP | Performed by: PHYSICAL THERAPIST

## 2023-07-06 PROCEDURE — 250N000011 HC RX IP 250 OP 636: Performed by: STUDENT IN AN ORGANIZED HEALTH CARE EDUCATION/TRAINING PROGRAM

## 2023-07-06 PROCEDURE — 250N000013 HC RX MED GY IP 250 OP 250 PS 637: Performed by: STUDENT IN AN ORGANIZED HEALTH CARE EDUCATION/TRAINING PROGRAM

## 2023-07-06 PROCEDURE — 97116 GAIT TRAINING THERAPY: CPT | Mod: GP | Performed by: PHYSICAL THERAPIST

## 2023-07-06 RX ORDER — CEFAZOLIN SODIUM 1 G/3ML
1 INJECTION, POWDER, FOR SOLUTION INTRAMUSCULAR; INTRAVENOUS EVERY 8 HOURS
Status: COMPLETED | OUTPATIENT
Start: 2023-07-06 | End: 2023-07-06

## 2023-07-06 RX ADMIN — GABAPENTIN 300 MG: 300 CAPSULE ORAL at 09:17

## 2023-07-06 RX ADMIN — CEFAZOLIN 1 G: 1 INJECTION, POWDER, FOR SOLUTION INTRAMUSCULAR; INTRAVENOUS at 10:32

## 2023-07-06 RX ADMIN — HYDROMORPHONE HYDROCHLORIDE 4 MG: 2 TABLET ORAL at 00:16

## 2023-07-06 RX ADMIN — CEFAZOLIN 1 G: 1 INJECTION, POWDER, FOR SOLUTION INTRAMUSCULAR; INTRAVENOUS at 02:08

## 2023-07-06 RX ADMIN — HYDROMORPHONE HYDROCHLORIDE 4 MG: 2 TABLET ORAL at 04:24

## 2023-07-06 RX ADMIN — HYDROXYZINE HYDROCHLORIDE 25 MG: 25 TABLET, FILM COATED ORAL at 04:25

## 2023-07-06 RX ADMIN — METHOCARBAMOL TABLETS 750 MG: 750 TABLET, COATED ORAL at 09:16

## 2023-07-06 RX ADMIN — ACETAMINOPHEN 650 MG: 325 TABLET, FILM COATED ORAL at 09:15

## 2023-07-06 RX ADMIN — BUPROPION HYDROCHLORIDE 150 MG: 150 TABLET, FILM COATED, EXTENDED RELEASE ORAL at 09:17

## 2023-07-06 RX ADMIN — SENNOSIDES AND DOCUSATE SODIUM 1 TABLET: 50; 8.6 TABLET ORAL at 09:16

## 2023-07-06 ASSESSMENT — ACTIVITIES OF DAILY LIVING (ADL)
ADLS_ACUITY_SCORE: 25

## 2023-07-06 NOTE — PROVIDER NOTIFICATION
Spoke with TATIANNA Hdz, patient is doing great can we please get a discharge order placed.    PA to place discharge order.

## 2023-07-06 NOTE — ANESTHESIA CARE TRANSFER NOTE
Patient: Nel Durán    Procedure: Procedure(s):  Right lumbar 5 to sacral 1 foraminotomy       Diagnosis: Lower extremity weakness [R29.898]  Diagnosis Additional Information: No value filed.    Anesthesia Type:   General     Note:    Oropharynx: oropharynx clear of all foreign objects  Level of Consciousness: drowsy  Oxygen Supplementation: face mask  Level of Supplemental Oxygen (L/min / FiO2): 6  Independent Airway: airway patency satisfactory and stable  Dentition: dentition unchanged  Vital Signs Stable: post-procedure vital signs reviewed and stable  Report to RN Given: handoff report given  Patient transferred to: PACU  Comments: Patient complains of pain, see anesthesia record for medications administered  Handoff Report: Identifed the Patient, Identified the Reponsible Provider, Reviewed the pertinent medical history, Discussed the surgical course, Reviewed Intra-OP anesthesia mangement and issues during anesthesia, Set expectations for post-procedure period and Allowed opportunity for questions and acknowledgement of understanding      Vitals:  Vitals Value Taken Time   /69 07/05/23 2015   Temp 37.2  C (99  F) 07/05/23 1915   Pulse 60 07/05/23 2029   Resp 12 07/05/23 2029   SpO2 100 % 07/05/23 2029   Vitals shown include unvalidated device data.    Electronically Signed By: SYDNI Moffett CRNA  July 5, 2023  8:31 PM

## 2023-07-06 NOTE — PROGRESS NOTES
A&Ox4. VSS/RA. A of 1 GB/W. Pain managed with PRN dilaudid, valium, and atarax. Ambulated to bathroom, voiding adequately. Discharge in progress. 3 lower back incision CDI.

## 2023-07-06 NOTE — PLAN OF CARE
Occupational Therapy Discharge Summary    Reason for therapy discharge:    Discharged to home.    Progress towards therapy goal(s). See goals on Care Plan in [facility] electronic health record for goal details.  Goals met    Therapy recommendation(s):    No further therapy is recommended. Overall, pt. moving well, CGA for transfers, mobility using WW; pt. mainly limited by signifcant back + RLE/groin pain ;anticipate w/ continued pain mgmt., progress, pt. will be able ot discharge home w/ assist from family as needed for I/ADl's.

## 2023-07-06 NOTE — ANESTHESIA POSTPROCEDURE EVALUATION
Patient: Nel Durán    Procedure: Procedure(s):  Right lumbar 5 to sacral 1 foraminotomy       Anesthesia Type:  General    Note:     Postop Pain Control: Uneventful            Sign Out: Well controlled pain   PONV: No   Neuro/Psych: Uneventful            Sign Out: Acceptable/Baseline neuro status   Airway/Respiratory: Uneventful            Sign Out: Acceptable/Baseline resp. status   CV/Hemodynamics: Uneventful            Sign Out: Acceptable CV status; No obvious hypovolemia; No obvious fluid overload   Other NRE: NONE   DID A NON-ROUTINE EVENT OCCUR? No           Last vitals:  Vitals Value Taken Time   /69 07/05/23 2015   Temp 37.2  C (99  F) 07/05/23 1915   Pulse 60 07/05/23 2029   Resp 12 07/05/23 2029   SpO2 100 % 07/05/23 2029   Vitals shown include unvalidated device data.    Electronically Signed By: Aubrey Martínez MD  July 5, 2023  8:48 PM

## 2023-07-06 NOTE — PLAN OF CARE
Goal Outcome Evaluation:  Patient is discharging home with family this afternoon. Discharge instructions and medications reviewed with patient, questions answered and verbalized understanding. IV removed and patient belongings accounted for.

## 2023-07-06 NOTE — PLAN OF CARE
Physical Therapy Discharge Summary    Reason for therapy discharge:    Discharged to home.    Progress towards therapy goal(s). See goals on Care Plan in [facility] electronic health record for goal details.  Goals met    Therapy recommendation(s):    No further therapy is recommended.    Goal Outcome Evaluation:

## 2023-07-06 NOTE — PROGRESS NOTES
Ortho Progress Note     Subjective:  No acute overnight events. Patient seen alongside Dr. Levi this morning. Reports resolution of RLE nerve pain and near-complete resolution of RLE numbness with mild residual sensory disturbance in the R great toe. No new or worsening radicular pain, paresthesias, weakness. Hoping for discharge today.     Objective:  BP 98/61 (BP Location: Right arm, Patient Position: Semi-Eldridge's)   Pulse 76   Temp 97.1  F (36.2  C) (Oral)   Resp 15   Wt 94.8 kg (209 lb)   LMP 03/09/2013   SpO2 94%   BMI 34.78 kg/m    Gen: alert and appropriately interactive, no acute distress  CV: visible skin appears well-perfused, extremities warm to touch   Resp: breathing equal and non-labored, no wheezing  MSK:      Spine:   Skin: Surgical dressings CDI without evidence of infection    Sensation:      R       L    L3:   Intact   Intact    L4:   Intact   Intact    L5:   Mild disturbance big toe  Intact    S1:   Intact   Intact     Motor:     R L    L3: Psoas    5  5    L4:   Quad    5  5    L4: TA   5 5    L5: EHL    5  5    S1: Eversion/PF  5  5         Hemoglobin   Date Value Ref Range Status   06/29/2023 11.2 (L) 11.7 - 15.7 g/dL Final   06/13/2023 13.4 11.7 - 15.7 g/dL Final   02/05/2018 13.6 11.7 - 15.7 g/dL Final   01/15/2015 9.9 (L) 11.7 - 15.7 g/dL Final       Assessment/Plan:  POD 1 s/p foraminotomies right L5-S1 and POD 10 s/p TLIF left L5-S1. Progressing well with resolution of RLE nerve pain and near-complete resolution of RLE numbness. RLE strength has returned to baseline; graded 5/5.     -Activity:                                       Up with assist  -Weight Bearing Status:            WBAT   -Bracing:                                      None  -Antibiotics:                                  Ancef x24h  -Anticoagulation:                        May restart Lovenox POD 2  -Pain control:                               IV and PO, wean to PO as able  -Dressing:                                      Ok to shower with dressings in place, dressings ok to get wet.  -Diet:                                              ADAT     -Disposition:                                 Pending pain, PT, anticipate discharge later today   -Follow up:                                   2 weeks in Dr. Levi's clinic    Iris Hdz PA-C  Orthopedic Spine Surgery  [facility]

## 2023-07-07 NOTE — DISCHARGE SUMMARY
ORTHOPAEDIC DISCHARGE SUMMARY     Date of Admission: 6/28/2023  Date of Discharge: 7/6/2023  1:37 PM  Disposition: Home  Surgeon: Torrey Levi MD      DISCHARGE DIAGNOSIS:  S/p lumbar spinal fusion    PROCEDURES: Procedure(s):  Right lumbar 5 to sacral 1 foraminotomy on 7/5/2023    BRIEF HISTORY:  Nel Durán is a 38 year old female who was admitted 6/28/2023 from the [facility] suites following a TLIF left L5-S1 performed by Dr. Levi on 6/26/23 in the setting of a suspected incidental intraoperative durotomy and development of severe RLE pain post-operatively.    HOSPITAL COURSE:    Patient was admitted 6/28/2023 from the [facility] suites following a TLIF left L5-S1 performed by Dr. Levi on 6/26/23 in the setting of a suspected  incidental intraoperative ventral durotomy managed with fibrin sealant. Postoperatively she was maintained on flat bedrest with no associated spinal headache. Pain was well-controlled until she developed severe RLE pain in a non-dermatomal pattern on the morning of 6/28/2023 (POD 2) which did not correlate with the intraoperative durotomy pattern. At the recommendation of Dr. Levi she was transferred to the hospital for further imaging and pain control.     Updated lumbar MRI obtained 6/29/2023 demonstrated no clear evidence of RLE neurologic compression to explain the RLE symptoms. The MRI also demonstrated no fluid collection concerning for ongoing CSF leak, prompting question of whether  intraoperative findings truly were consistent with durotomy given lack of intraoperative appearance of classic clear fluid.    Patient subsequently progressed well with gradual raise of HOB and mobilization although limited by persistent RLE pain which progressed to numbness and right EHL weakness on 7/2, warranting a CT scan. This demonstrated severe foraminal stenosis on the right with L5 root compression due to segmental extension through the L5-S1  level causing the S1 SAP to close down the foramen.      After discussion of options with Dr. Levi, she elected to proceed with a return to the OR for a foraminotomy right L5-S1 which was performed 7/05/2023.     Surgery was uncomplicated and she has done well post-operatively with resolution of her RLE radicular symptoms, allowing for significantly improved pain control and mobilization.     The patient received routine nursing cares and is medically stable. Vital signs are stable. The patient is tolerating a regular diet without GI distress/nausea or vomiting. Voiding spontaneously. All PT/OT goals have been met for safe mobility. In the setting of limited mobilization Lovenox was initiated 6/30/2023 and will not be continued upon discharge.  Pain is now controlled on oral medications which will be available on discharge. Stool softeners have been used while taking pain medications to help prevent constipation. Nel Durán is deemed medically safe to discharge.     Antibiotics:  Given periop and 24 hours postop.  PT Progress:  Has met PT/OT goals for safe mobility.   Pain Meds:  Weaned off all IV pain meds by discharge.    Discharge orders and instructions as below.    FOLLOWUP:    Follow up in 2 weeks in Dr. Levi's clinic.   Follow up with PCP as necessary for post-operative check in.       PLANNED DISCHARGE ORDERS:     DVT Prophylaxis: Mobilization        Discharge Medication List as of 7/6/2023  1:17 PM      START taking these medications    Details   gabapentin (NEURONTIN) 300 MG capsule Take 1 capsule (300 mg) by mouth 3 times daily, Disp-90 capsule, R-0, E-Prescribe      HYDROmorphone (DILAUDID) 2 MG tablet Take 1 tablet (2 mg) by mouth every 3 hours as needed for moderate pain or moderate to severe pain, Disp-30 tablet, R-0, E-Prescribe      hydrOXYzine (ATARAX) 25 MG tablet Take 1 tablet (25 mg) by mouth every 6 hours as needed for itching or other (adjuvant pain), Disp-30 tablet, R-0, E-Prescribe       methocarbamol (ROBAXIN) 750 MG tablet Take 1 tablet (750 mg) by mouth every 6 hours as needed for muscle spasms, Disp-30 tablet, R-0, E-Prescribe      senna-docusate (SENOKOT-S/PERICOLACE) 8.6-50 MG tablet Take 1 tablet by mouth 2 times daily for 7 days, Disp-14 tablet, R-0, E-Prescribe         CONTINUE these medications which have NOT CHANGED    Details   buPROPion (WELLBUTRIN XL) 150 MG 24 hr tablet Take 1 tablet (150 mg) by mouth every morning, Disp-30 tablet, R-4, E-Prescribe      cetirizine (ZYRTEC) 10 MG tablet Take 10 mg by mouth daily, Historical      methylphenidate (RITALIN LA) 30 MG 24 hr capsule Take 1 capsule (30 mg) by mouth daily, Disp-30 capsule, R-0, E-Prescribe      ondansetron (ZOFRAN ODT) 4 MG ODT tab Take 1 tablet (4 mg) by mouth every 8 hours as needed for nausea, Disp-18 tablet, R-0, E-Prescribe      Prenatal Vit-Fe Fumarate-FA (PRENATAL VITAMIN) 27-0.8 MG TABS Take 1 tablet by mouth daily, Historical      SUMAtriptan (IMITREX) 50 MG tablet Take 1 tablet (50 mg) by mouth at onset of headache for migraine, Disp-12 tablet, R-3, E-Prescribe      vitamin C with B complex (B COMPLEX-C) tablet Take 1 tablet by mouth daily, Historical         STOP taking these medications       enoxaparin ANTICOAGULANT (LOVENOX) 40 MG/0.4ML syringe Comments:   Reason for Stopping:                 Discharge Procedure Orders   Reason for your hospital stay   Order Comments: Transforaminal Lumbar Interbody Fusion left L5-S1     Follow-up and recommended labs and tests    Order Comments: Follow up in 2 weeks in Dr. Levi's clinic   Follow up with PCP as needed for post-operative check in     Activity   Order Comments: Your activity upon discharge: weight bear as tolerated. See formal activity restrictions below.     Order Specific Question Answer Comments   Is discharge order? Yes      Discharge Instructions   Order Comments: Care after a Lumbar fusion - Dr. Torrey Levi     The following information will help you  through your recovery at home.     Pain  - It is normal for you to experience some pain in the area of your incision after your surgery.  If you had leg pain preoperatively, some of your leg pain may go away immediately after your surgery.  Some of the pain will gradually decrease over the next few days or weeks depending on the amount of inflammation present in the nerve.    - If some of your leg pain returns 3 to 5 days after surgery it may be due to swelling around the nerve.  If this is severe, contact Dr Levi's team.    - You should call Dr. Levi's office if your leg pain returns suddenly and does not improve over 24 hours.     Home Medications  - If you take a blood thinner (e.g. aspirin, warfarin, Xeralto, Eliquis, etc...) at baseline, wait until two days after the operation to start taking it again.  This is to lower the risk of a blood collection pushing on the nerves in the surgery site.  If after starting your blood thinner again you notice severe worsening back and leg pain, contact Dr Levi's office immediately, as this could represent a blood collection forming.     Activity  - You may increase your activity as tolerated; walking is the best form of exercise after spine surgery.    - For six weeks after surgery avoid bending, lifting, and twisting (BLT).  Avoid activities such as vacuuming, raking and shoveling.   - Try to limit your lifting to 5-10 lbs during the first 2 weeks and then increase to 20-25lbs over the next 6 weeks.  - You may return to work approximately 1- 2 weeks after your surgery if you have a sedentary or desk type job.  If you have a physical job Dr. Levi and his staff will help you determine a return to work plan.    - You may resume sexual activity at the six week tennille.  Stop if you have pain.     Driving  - You may drive if you feel strong enough and are not taking any narcotic pain medications.     Incision Site   - There are clear plastic dressings over your incision(s) with  gauze underneath. This clear plastic is waterproof, and you may shower with it in place.  If water gets underneath the plastic dressing and makes the gauze wet then you should remove the entire dressing.  If the dressing starts to peel off substantially, you may remove it the rest of the way.  You do not need to replace if afterwards unless you feel more comfortable with a dressing over the top of the incision, in which case you may replace it with a simple gauze and tape dressing.  - Do not immerse the incisions in water such as (bath, pool, hot tub) for at least 3 weeks.  Do not scrub the incision sites while in the shower.       Pain Management   - Take your prescribed pain medication as needed and directed.  You may use Tylenol (up to 4,000 mg daily), gabapentin, methocarbamol, and Atarax for baseline pain control. You may use the oral Dilaudid for breakthrough pain. Plan to wean off of Dilaudid over the next several days. You may continue to use Tylenol, gabapentin, methocarbamol, and Atarax for your discomfort when you no longer need the narcotic pain medication and as long as you are tolerating them well.     - Do not combine the prescribed pain medications with any sedating home medications   - If you have had a fusion, do NOT use ibuprofen, meloxicam, naproxen, or other NSAIDs unless cleared by Dr. Levi's team, as these types of  medications can inhibit your body's ability to fuse properly     - If you need a refill on your pain medication call [phone], please allow 24 hours for your prescription to be refilled, Dr. Levi's office does not refill pain medications on Friday afternoons.     Diet   - Eat a healthy diet; this will help your recovery.  - Drink plenty of fluids, water, milk or juice.  - Use your prescribed stool softener as directed.   - If you have trouble with constipation you should eat more fiber, drink more fluids, increase your walking or try an over the counter laxative.     Follow-up  Visits  - You will see Iris Hdz PA-C for your 2-week post-operative follow up appointment. You will see Dr. Levi for your 6-week post-operative follow up appointment. You should have had both of these appointments scheduled for you at the same time your surgery was scheduled. If you did not, please call Dr. Levi's office when you get home from your surgery.  - Write down any questions you have about your surgery, recovery, return to work and other topics you wished to be covered at your post-op visit.  This way, we will be able to address all of your questions at your next visit.  - Call Dr. Levi's office if you have any questions or concerns.     When to Call your Doctor:  - If you have any redness, warmth or swelling at the incision site.  - If your incision opens up.  - If you have increasing drainage from your incision.  - If you have a temperature greater than 100.5 degrees Fahrenheit.  - If at any point you develop a headache that changes with your positions (worse with sitting up, improves with lying down), nausea, or sudden increased incisional drainage     Walker Order for DME - ONLY FOR DME   Order Comments: I, the undersigned, certify that the above prescribed supplies are medically necessary for this patient and is both reasonable and necessary in reference to accepted standards of medical and necessary in reference to accepted standards of medical practice in the treatment of this patient's condition and is not prescribed as a convenience.      Order Specific Question Answer Comments   DME Provider: [facility]    Start Date: 7/5/2023    Walker Type: Standard (2 Wheel)    Diagnosis: R26.89 - Impaired Gait and Mobility      Diet   Order Comments: Follow this diet upon discharge:     Start with small bites, soft foods, and liquids and slowly progress your diet as tolerated.     Order Specific Question Answer Comments   Is discharge order? Yes            Torrey Levi MD  Orthopaedic Spine  Surgery  [facility]

## 2023-07-11 ENCOUNTER — TRANSFERRED RECORDS (OUTPATIENT)
Dept: HEALTH INFORMATION MANAGEMENT | Facility: CLINIC | Age: 38
End: 2023-07-11
Payer: COMMERCIAL

## 2023-07-11 ENCOUNTER — TELEPHONE (OUTPATIENT)
Dept: FAMILY MEDICINE | Facility: CLINIC | Age: 38
End: 2023-07-11
Payer: COMMERCIAL

## 2023-07-11 NOTE — TELEPHONE ENCOUNTER
ED / Discharge Outreach Protocol  [facility]  Lumbar spinal fusion  6/28/23-7/6/23    Patient Contact    Attempt # 1    Was call answered?  No.  Left message on voicemail with information to call me back.

## 2023-07-12 ENCOUNTER — LAB (OUTPATIENT)
Dept: LAB | Facility: CLINIC | Age: 38
End: 2023-07-12
Payer: COMMERCIAL

## 2023-07-12 DIAGNOSIS — M54.50 LUMBAGO: Primary | ICD-10-CM

## 2023-07-12 LAB
ALBUMIN SERPL BCG-MCNC: 4.1 G/DL (ref 3.5–5.2)
ALP SERPL-CCNC: 75 U/L (ref 35–104)
ALT SERPL W P-5'-P-CCNC: 25 U/L (ref 0–50)
ANION GAP SERPL CALCULATED.3IONS-SCNC: 9 MMOL/L (ref 7–15)
AST SERPL W P-5'-P-CCNC: 26 U/L (ref 0–45)
BILIRUB SERPL-MCNC: <0.2 MG/DL
BUN SERPL-MCNC: 7.6 MG/DL (ref 6–20)
CALCIUM SERPL-MCNC: 9.1 MG/DL (ref 8.6–10)
CHLORIDE SERPL-SCNC: 105 MMOL/L (ref 98–107)
CREAT SERPL-MCNC: 0.87 MG/DL (ref 0.51–0.95)
DEPRECATED HCO3 PLAS-SCNC: 26 MMOL/L (ref 22–29)
GFR SERPL CREATININE-BSD FRML MDRD: 86 ML/MIN/1.73M2
GLUCOSE SERPL-MCNC: 82 MG/DL (ref 70–99)
MAGNESIUM SERPL-MCNC: 2.1 MG/DL (ref 1.7–2.3)
POTASSIUM SERPL-SCNC: 4.3 MMOL/L (ref 3.4–5.3)
PROT SERPL-MCNC: 6.9 G/DL (ref 6.4–8.3)
SODIUM SERPL-SCNC: 140 MMOL/L (ref 136–145)

## 2023-07-12 PROCEDURE — 80053 COMPREHEN METABOLIC PANEL: CPT

## 2023-07-12 PROCEDURE — 83735 ASSAY OF MAGNESIUM: CPT

## 2023-07-12 PROCEDURE — 36415 COLL VENOUS BLD VENIPUNCTURE: CPT

## 2023-07-18 NOTE — TELEPHONE ENCOUNTER
"ED/Discharge Protocol    \"Hi, my name is Marylou Jiang RN, a registered nurse, and I am calling on behalf of Cole Zayas's office at [place].  I am calling to follow up and see how things are going for you after your recent visit.\"    \"I see that you were in the (hospital) on 6/28/23.    How are you doing now that you are home?\" \"better now, I am back on high dose steroids to help with shaking and pain\"    Is patient experiencing symptoms that may require a hospital visit?  Not at this time    Discharge Instructions    \"Let's review your discharge instructions.  What is/are the follow-up recommendations?  Pt. Response: You will see Iris Hdz PA-C for your 2-week post-operative follow up appointment. You will see Dr. Levi for your 6-week post-operative follow up appointment.    \"Were you instructed to make a follow-up appointment?\"  Pt. Response: Yes.  Has appointment been made?   Yes      \"When you see the provider, I would recommend that you bring your discharge instructions with you.    Medications    \"How many new medications are you on since your hospitalization/ED visit?\"    0-1 : these are short term medications used for post surgical pain and discomfort   \"How many of your current medicines changed (dose, timing, name, etc.) while you were in the hospital/ED visit?\"   0  \"Do you have questions about your medications?\"   No  \"Were you newly diagnosed with heart failure, COPD, diabetes or did you have a heart attack?\"   No  For patients on insulin: \"Did you start on insulin in the hospital or did you have your insulin dose changed?\"   No  Post Discharge Medication Reconciliation Status: discharge medications reconciled, continue medications without change.    Was MTM referral placed (*Make sure to put transitions as reason for referral)?   No    Call Summary    \"Do you have any questions or concerns about your condition or care plan at the moment?\"    No  Triage nurse advice given: Patient will " "call PCP clinic if needing follow up appointment or any other conerns    \"If you have questions or things don't continue to improve, we encourage you contact us through the main clinic number.  Even if the clinic is not open, triage nurses are available 24/7 to help you.     We would like you to know that our clinic has extended hours (provide information).  We also have urgent care (provide details on closest location and hours/contact info)\"      \"Thank you for your time and take care!\"        "

## 2023-07-25 ENCOUNTER — TRANSFERRED RECORDS (OUTPATIENT)
Dept: HEALTH INFORMATION MANAGEMENT | Facility: CLINIC | Age: 38
End: 2023-07-25
Payer: COMMERCIAL

## 2023-08-03 ENCOUNTER — TRANSFERRED RECORDS (OUTPATIENT)
Dept: HEALTH INFORMATION MANAGEMENT | Facility: CLINIC | Age: 38
End: 2023-08-03
Payer: COMMERCIAL

## 2023-08-05 DIAGNOSIS — F34.1 DYSTHYMIA: ICD-10-CM

## 2023-08-05 DIAGNOSIS — F41.1 GAD (GENERALIZED ANXIETY DISORDER): ICD-10-CM

## 2023-08-07 RX ORDER — BUPROPION HYDROCHLORIDE 150 MG/1
150 TABLET ORAL EVERY MORNING
Qty: 30 TABLET | Refills: 1 | Status: SHIPPED | OUTPATIENT
Start: 2023-08-07 | End: 2023-09-11

## 2023-08-10 ENCOUNTER — TRANSFERRED RECORDS (OUTPATIENT)
Dept: HEALTH INFORMATION MANAGEMENT | Facility: CLINIC | Age: 38
End: 2023-08-10
Payer: COMMERCIAL

## 2023-08-15 ENCOUNTER — TRANSFERRED RECORDS (OUTPATIENT)
Dept: HEALTH INFORMATION MANAGEMENT | Facility: CLINIC | Age: 38
End: 2023-08-15
Payer: COMMERCIAL

## 2023-08-21 DIAGNOSIS — F90.2 ATTENTION DEFICIT HYPERACTIVITY DISORDER (ADHD), COMBINED TYPE: ICD-10-CM

## 2023-08-21 NOTE — TELEPHONE ENCOUNTER
JS,  Patient calling to request refill for ritalin prescription.  Patient states she is starting school shortly and needs to have prescription in order to be successful.  States she usually goes well over the three months for how long her refills last and so it is not an issue.  Has upcoming visit Sept 11.  Please advise.  Zuleyma MONTES RN

## 2023-08-23 RX ORDER — METHYLPHENIDATE HYDROCHLORIDE 30 MG/1
30 CAPSULE, EXTENDED RELEASE ORAL DAILY
Qty: 30 CAPSULE | Refills: 0 | Status: SHIPPED | OUTPATIENT
Start: 2023-08-23 | End: 2024-04-04

## 2023-09-11 ENCOUNTER — OFFICE VISIT (OUTPATIENT)
Dept: FAMILY MEDICINE | Facility: CLINIC | Age: 38
End: 2023-09-11
Payer: COMMERCIAL

## 2023-09-11 VITALS
HEIGHT: 65 IN | RESPIRATION RATE: 18 BRPM | TEMPERATURE: 97.5 F | BODY MASS INDEX: 34.62 KG/M2 | HEART RATE: 83 BPM | SYSTOLIC BLOOD PRESSURE: 127 MMHG | WEIGHT: 207.8 LBS | DIASTOLIC BLOOD PRESSURE: 87 MMHG | OXYGEN SATURATION: 99 %

## 2023-09-11 DIAGNOSIS — F41.1 GAD (GENERALIZED ANXIETY DISORDER): ICD-10-CM

## 2023-09-11 DIAGNOSIS — Z23 NEED FOR VACCINATION: ICD-10-CM

## 2023-09-11 DIAGNOSIS — F90.2 ATTENTION DEFICIT HYPERACTIVITY DISORDER (ADHD), COMBINED TYPE: ICD-10-CM

## 2023-09-11 DIAGNOSIS — F34.1 DYSTHYMIA: ICD-10-CM

## 2023-09-11 DIAGNOSIS — Z98.1 S/P LUMBAR SPINAL FUSION: ICD-10-CM

## 2023-09-11 DIAGNOSIS — G43.809 OTHER MIGRAINE WITHOUT STATUS MIGRAINOSUS, NOT INTRACTABLE: Primary | ICD-10-CM

## 2023-09-11 PROCEDURE — 90686 IIV4 VACC NO PRSV 0.5 ML IM: CPT | Performed by: PHYSICIAN ASSISTANT

## 2023-09-11 PROCEDURE — 90471 IMMUNIZATION ADMIN: CPT | Performed by: PHYSICIAN ASSISTANT

## 2023-09-11 PROCEDURE — 99214 OFFICE O/P EST MOD 30 MIN: CPT | Mod: 25 | Performed by: PHYSICIAN ASSISTANT

## 2023-09-11 RX ORDER — GABAPENTIN 100 MG/1
100 CAPSULE ORAL DAILY
Qty: 30 CAPSULE | Refills: 3 | Status: SHIPPED | OUTPATIENT
Start: 2023-09-11 | End: 2024-08-02

## 2023-09-11 RX ORDER — RIZATRIPTAN BENZOATE 10 MG/1
10 TABLET, ORALLY DISINTEGRATING ORAL
Qty: 12 TABLET | Refills: 3 | Status: SHIPPED | OUTPATIENT
Start: 2023-09-11

## 2023-09-11 RX ORDER — BUPROPION HYDROCHLORIDE 150 MG/1
150 TABLET ORAL EVERY MORNING
Qty: 90 TABLET | Refills: 3 | Status: SHIPPED | OUTPATIENT
Start: 2023-09-11 | End: 2024-08-02

## 2023-09-11 ASSESSMENT — ANXIETY QUESTIONNAIRES
GAD7 TOTAL SCORE: 3
7. FEELING AFRAID AS IF SOMETHING AWFUL MIGHT HAPPEN: NOT AT ALL
6. BECOMING EASILY ANNOYED OR IRRITABLE: NOT AT ALL
5. BEING SO RESTLESS THAT IT IS HARD TO SIT STILL: SEVERAL DAYS
3. WORRYING TOO MUCH ABOUT DIFFERENT THINGS: NOT AT ALL
1. FEELING NERVOUS, ANXIOUS, OR ON EDGE: SEVERAL DAYS
4. TROUBLE RELAXING: SEVERAL DAYS
IF YOU CHECKED OFF ANY PROBLEMS ON THIS QUESTIONNAIRE, HOW DIFFICULT HAVE THESE PROBLEMS MADE IT FOR YOU TO DO YOUR WORK, TAKE CARE OF THINGS AT HOME, OR GET ALONG WITH OTHER PEOPLE: SOMEWHAT DIFFICULT
2. NOT BEING ABLE TO STOP OR CONTROL WORRYING: NOT AT ALL
GAD7 TOTAL SCORE: 3

## 2023-09-11 ASSESSMENT — PAIN SCALES - GENERAL: PAINLEVEL: NO PAIN (0)

## 2023-09-11 ASSESSMENT — PATIENT HEALTH QUESTIONNAIRE - PHQ9
SUM OF ALL RESPONSES TO PHQ QUESTIONS 1-9: 2
SUM OF ALL RESPONSES TO PHQ QUESTIONS 1-9: 2
10. IF YOU CHECKED OFF ANY PROBLEMS, HOW DIFFICULT HAVE THESE PROBLEMS MADE IT FOR YOU TO DO YOUR WORK, TAKE CARE OF THINGS AT HOME, OR GET ALONG WITH OTHER PEOPLE: SOMEWHAT DIFFICULT

## 2023-09-11 NOTE — PROGRESS NOTES
"  Assessment & Plan     Other migraine without status migrainosus, not intractable  Imitrex has stopped working, will trial different triptan.  - rizatriptan (MAXALT-MLT) 10 MG ODT; Take 1 tablet (10 mg) by mouth at onset of headache for migraine May repeat in 2 hours. Max 3 tablets/24 hours.    S/P lumbar spinal fusion  Following with neurosurgery, they did plan to start low dose gabapentin during the day to help pain but forgot to call in script.  - gabapentin (NEURONTIN) 100 MG capsule; Take 1 capsule (100 mg) by mouth daily    CONI (generalized anxiety disorder)  stable  - buPROPion (WELLBUTRIN XL) 150 MG 24 hr tablet; Take 1 tablet (150 mg) by mouth every morning    Dysthymia  stable  - buPROPion (WELLBUTRIN XL) 150 MG 24 hr tablet; Take 1 tablet (150 mg) by mouth every morning    Need for vaccination    - INFLUENZA VACCINE IM > 6 MONTHS VALENT IIV4 (AFLURIA/FLUZONE)    Attention deficit hyperactivity disorder (ADHD), combined type  Doing well, helping control symptoms without side effects.  Declines any adjustments   - methylphenidate (RITALIN LA) 30 MG 24 hr capsule; Take 1 capsule (30 mg) by mouth daily for 30 days  - methylphenidate (RITALIN LA) 30 MG 24 hr capsule; Take 1 capsule (30 mg) by mouth daily for 30 days  - methylphenidate (RITALIN LA) 30 MG 24 hr capsule; Take 1 capsule (30 mg) by mouth daily for 30 days             BMI:   Estimated body mass index is 34.58 kg/m  as calculated from the following:    Height as of this encounter: 1.651 m (5' 5\").    Weight as of this encounter: 94.3 kg (207 lb 12.8 oz).           MEAGHAN [facility]   Nel is a 38 year old, presenting for the following health issues:  Recheck Medication         No data to display                History of Present Illness       Back Pain:  She presents for follow up of back pain. Patient's back pain is a chronic problem.  Location of back pain:  Right lower back, left lower " "back, right side of neck and other  Description of back pain: cramping, dull ache, sharp, shooting and stabbing  Back pain spreads: left buttocks, left knee and left foot    Since patient first noticed back pain, pain is: always present, but gets better and worse  Does back pain interfere with her job:  Yes       Mental Health Follow-up:  Patient presents to follow-up on Depression & Anxiety.Patient's depression since last visit has been:  Good  The patient is not having other symptoms associated with depression.  Patient's anxiety since last visit has been:  Better  The patient is having other symptoms associated with anxiety.  Any significant life events: job concerns, financial concerns and health concerns  Patient is feeling anxious or having panic attacks.  Patient has no concerns about alcohol or drug use.    Headaches:   Since the patient's last clinic visit, headaches are: worsened  The patient is getting headaches:  2-3 times a week  She is not able to do normal daily activities when she has a migraine.  The patient is taking the following rescue/relief medications:  Naproxyn (Aleve), Excedrin and sumatriptan (Imitrex)   Patient states \"I get only a small amount of relief\" from the rescue/relief medications.   The patient is taking the following medications to prevent migraines:  Neurontin  In the past 4 weeks, the patient has gone to an Urgent Care or Emergency Room 0 times times due to headaches.    Reason for visit:  Med check. Celiac bloodwork    She eats 0-1 servings of fruits and vegetables daily.She consumes 0 sweetened beverage(s) daily.She exercises with enough effort to increase her heart rate 30 to 60 minutes per day.  She exercises with enough effort to increase her heart rate 4 days per week.   She is taking medications regularly.               Review of Systems   Constitutional, HEENT, cardiovascular, pulmonary, gi and gu systems are negative, except as otherwise noted.      Objective    LMP " 03/09/2013   There is no height or weight on file to calculate BMI.  Physical Exam   GENERAL: alert and no distress  EYES: Eyes grossly normal to inspection  RESP: lungs clear to auscultation - no rales, rhonchi or wheezes  CV: regular rate and rhythm, normal S1 S2, no S3 or S4, no murmur, click or rub, no peripheral edema and peripheral pulses strong  PSYCH: mentation appears normal, affect normal/bright

## 2023-09-11 NOTE — NURSING NOTE
Per orders of , injection of Flu given by Sarah Mello RN. Prior to immunization administration, verified patients identity using patient s name and date of birth. Patient instructed to remain in clinic for 15 minutes afterwards, and to report any adverse reaction to me or clinic staff immediately.    Sarah Mello RN on 9/11/2023 at 11:17 AM.    Please see Immunization Activity for additional information.

## 2023-09-12 RX ORDER — METHYLPHENIDATE HYDROCHLORIDE 30 MG/1
30 CAPSULE, EXTENDED RELEASE ORAL DAILY
Qty: 30 CAPSULE | Refills: 0 | Status: SHIPPED | OUTPATIENT
Start: 2023-09-22 | End: 2023-10-22

## 2023-09-12 RX ORDER — METHYLPHENIDATE HYDROCHLORIDE 30 MG/1
30 CAPSULE, EXTENDED RELEASE ORAL DAILY
Qty: 30 CAPSULE | Refills: 0 | Status: SHIPPED | OUTPATIENT
Start: 2023-11-20 | End: 2023-11-27

## 2023-09-12 RX ORDER — METHYLPHENIDATE HYDROCHLORIDE 30 MG/1
30 CAPSULE, EXTENDED RELEASE ORAL DAILY
Qty: 30 CAPSULE | Refills: 0 | Status: SHIPPED | OUTPATIENT
Start: 2023-10-21 | End: 2023-11-20

## 2023-10-10 ENCOUNTER — TRANSFERRED RECORDS (OUTPATIENT)
Dept: HEALTH INFORMATION MANAGEMENT | Facility: CLINIC | Age: 38
End: 2023-10-10
Payer: COMMERCIAL

## 2023-11-27 ENCOUNTER — TELEPHONE (OUTPATIENT)
Dept: FAMILY MEDICINE | Facility: CLINIC | Age: 38
End: 2023-11-27
Payer: COMMERCIAL

## 2023-11-27 DIAGNOSIS — F90.2 ATTENTION DEFICIT HYPERACTIVITY DISORDER (ADHD), COMBINED TYPE: ICD-10-CM

## 2023-11-27 RX ORDER — METHYLPHENIDATE HYDROCHLORIDE 30 MG/1
30 CAPSULE, EXTENDED RELEASE ORAL DAILY
Qty: 30 CAPSULE | Refills: 0 | Status: SHIPPED | OUTPATIENT
Start: 2023-11-27 | End: 2023-12-27

## 2023-11-27 NOTE — TELEPHONE ENCOUNTER
JS,      Patient called.  [facility] in [place] out of Methylphenidate  30 mg     Called [facility] to confirm.  Patient requesting Rx be sent to [facility] (Nov Rx).      Thank you,  Rachelle Ceja RN      Triage note: patient would like to be called back when Rx sent in;[phone]

## 2023-12-13 ENCOUNTER — MYC MEDICAL ADVICE (OUTPATIENT)
Dept: FAMILY MEDICINE | Facility: CLINIC | Age: 38
End: 2023-12-13
Payer: COMMERCIAL

## 2023-12-19 ENCOUNTER — TRANSFERRED RECORDS (OUTPATIENT)
Dept: HEALTH INFORMATION MANAGEMENT | Facility: CLINIC | Age: 38
End: 2023-12-19
Payer: COMMERCIAL

## 2023-12-27 DIAGNOSIS — F90.2 ATTENTION DEFICIT HYPERACTIVITY DISORDER (ADHD), COMBINED TYPE: ICD-10-CM

## 2023-12-27 RX ORDER — METHYLPHENIDATE HYDROCHLORIDE 30 MG/1
30 CAPSULE, EXTENDED RELEASE ORAL DAILY
Qty: 30 CAPSULE | Refills: 0 | Status: SHIPPED | OUTPATIENT
Start: 2023-12-27 | End: 2024-01-26

## 2024-04-04 ENCOUNTER — MYC REFILL (OUTPATIENT)
Dept: FAMILY MEDICINE | Facility: CLINIC | Age: 39
End: 2024-04-04
Payer: COMMERCIAL

## 2024-04-04 DIAGNOSIS — F90.2 ATTENTION DEFICIT HYPERACTIVITY DISORDER (ADHD), COMBINED TYPE: ICD-10-CM

## 2024-04-04 DIAGNOSIS — G43.809 OTHER MIGRAINE WITHOUT STATUS MIGRAINOSUS, NOT INTRACTABLE: ICD-10-CM

## 2024-04-04 DIAGNOSIS — Z98.1 S/P LUMBAR SPINAL FUSION: ICD-10-CM

## 2024-04-04 RX ORDER — METHYLPHENIDATE HYDROCHLORIDE 30 MG/1
30 CAPSULE, EXTENDED RELEASE ORAL DAILY
Qty: 30 CAPSULE | Refills: 0 | Status: SHIPPED | OUTPATIENT
Start: 2024-04-04 | End: 2024-06-11

## 2024-04-04 RX ORDER — ONDANSETRON 4 MG/1
4 TABLET, ORALLY DISINTEGRATING ORAL EVERY 8 HOURS PRN
Qty: 18 TABLET | Refills: 0 | Status: SHIPPED | OUTPATIENT
Start: 2024-04-04

## 2024-04-04 RX ORDER — GABAPENTIN 100 MG/1
100 CAPSULE ORAL DAILY
Qty: 30 CAPSULE | Refills: 3 | OUTPATIENT
Start: 2024-04-04

## 2024-04-14 ENCOUNTER — HEALTH MAINTENANCE LETTER (OUTPATIENT)
Age: 39
End: 2024-04-14

## 2024-06-04 DIAGNOSIS — F90.2 ATTENTION DEFICIT HYPERACTIVITY DISORDER (ADHD), COMBINED TYPE: ICD-10-CM

## 2024-06-11 RX ORDER — METHYLPHENIDATE HYDROCHLORIDE 30 MG/1
CAPSULE, EXTENDED RELEASE ORAL
Qty: 30 CAPSULE | Refills: 0 | Status: SHIPPED | OUTPATIENT
Start: 2024-06-11 | End: 2024-08-02

## 2024-06-11 NOTE — TELEPHONE ENCOUNTER
LVM To call and schedule a VV For next refills  [facility] Unit Coordinator

## 2024-06-23 ENCOUNTER — HEALTH MAINTENANCE LETTER (OUTPATIENT)
Age: 39
End: 2024-06-23

## 2024-07-23 ENCOUNTER — APPOINTMENT (OUTPATIENT)
Dept: LAB | Facility: CLINIC | Age: 39
End: 2024-07-23
Payer: COMMERCIAL

## 2024-07-31 DIAGNOSIS — F90.2 ATTENTION DEFICIT HYPERACTIVITY DISORDER (ADHD), COMBINED TYPE: ICD-10-CM

## 2024-08-01 ASSESSMENT — ANXIETY QUESTIONNAIRES
GAD7 TOTAL SCORE: 4
GAD7 TOTAL SCORE: 4
8. IF YOU CHECKED OFF ANY PROBLEMS, HOW DIFFICULT HAVE THESE MADE IT FOR YOU TO DO YOUR WORK, TAKE CARE OF THINGS AT HOME, OR GET ALONG WITH OTHER PEOPLE?: SOMEWHAT DIFFICULT
2. NOT BEING ABLE TO STOP OR CONTROL WORRYING: NOT AT ALL
6. BECOMING EASILY ANNOYED OR IRRITABLE: SEVERAL DAYS
7. FEELING AFRAID AS IF SOMETHING AWFUL MIGHT HAPPEN: NOT AT ALL
3. WORRYING TOO MUCH ABOUT DIFFERENT THINGS: NOT AT ALL
5. BEING SO RESTLESS THAT IT IS HARD TO SIT STILL: SEVERAL DAYS
4. TROUBLE RELAXING: SEVERAL DAYS
7. FEELING AFRAID AS IF SOMETHING AWFUL MIGHT HAPPEN: NOT AT ALL
GAD7 TOTAL SCORE: 4
IF YOU CHECKED OFF ANY PROBLEMS ON THIS QUESTIONNAIRE, HOW DIFFICULT HAVE THESE PROBLEMS MADE IT FOR YOU TO DO YOUR WORK, TAKE CARE OF THINGS AT HOME, OR GET ALONG WITH OTHER PEOPLE: SOMEWHAT DIFFICULT
1. FEELING NERVOUS, ANXIOUS, OR ON EDGE: SEVERAL DAYS

## 2024-08-01 ASSESSMENT — PATIENT HEALTH QUESTIONNAIRE - PHQ9
SUM OF ALL RESPONSES TO PHQ QUESTIONS 1-9: 6
SUM OF ALL RESPONSES TO PHQ QUESTIONS 1-9: 6
10. IF YOU CHECKED OFF ANY PROBLEMS, HOW DIFFICULT HAVE THESE PROBLEMS MADE IT FOR YOU TO DO YOUR WORK, TAKE CARE OF THINGS AT HOME, OR GET ALONG WITH OTHER PEOPLE: SOMEWHAT DIFFICULT

## 2024-08-02 ENCOUNTER — APPOINTMENT (OUTPATIENT)
Dept: LAB | Facility: CLINIC | Age: 39
End: 2024-08-02
Payer: COMMERCIAL

## 2024-08-02 ENCOUNTER — VIRTUAL VISIT (OUTPATIENT)
Dept: FAMILY MEDICINE | Facility: CLINIC | Age: 39
End: 2024-08-02
Payer: COMMERCIAL

## 2024-08-02 DIAGNOSIS — F41.1 GAD (GENERALIZED ANXIETY DISORDER): ICD-10-CM

## 2024-08-02 DIAGNOSIS — Z01.84 IMMUNITY STATUS TESTING: Primary | ICD-10-CM

## 2024-08-02 DIAGNOSIS — Z12.31 VISIT FOR SCREENING MAMMOGRAM: Primary | ICD-10-CM

## 2024-08-02 DIAGNOSIS — F34.1 DYSTHYMIA: ICD-10-CM

## 2024-08-02 DIAGNOSIS — F90.2 ATTENTION DEFICIT HYPERACTIVITY DISORDER (ADHD), COMBINED TYPE: ICD-10-CM

## 2024-08-02 LAB
MUMPS ANTIBODY IGG INSTRUMENT VALUE: 250 AU/ML
MUV IGG SER QL IA: POSITIVE

## 2024-08-02 PROCEDURE — 99441 PR PHYSICIAN TELEPHONE EVALUATION 5-10 MIN: CPT | Mod: 93 | Performed by: PHYSICIAN ASSISTANT

## 2024-08-02 PROCEDURE — 86735 MUMPS ANTIBODY: CPT | Mod: 59 | Performed by: FAMILY MEDICINE

## 2024-08-02 RX ORDER — METHYLPHENIDATE HYDROCHLORIDE 30 MG/1
30 CAPSULE, EXTENDED RELEASE ORAL DAILY
Qty: 30 CAPSULE | Refills: 0 | Status: SHIPPED | OUTPATIENT
Start: 2024-10-01 | End: 2024-10-31

## 2024-08-02 RX ORDER — METHYLPHENIDATE HYDROCHLORIDE 30 MG/1
30 CAPSULE, EXTENDED RELEASE ORAL DAILY
Qty: 30 CAPSULE | Refills: 0 | Status: SHIPPED | OUTPATIENT
Start: 2024-08-02 | End: 2024-09-01

## 2024-08-02 RX ORDER — BUPROPION HYDROCHLORIDE 150 MG/1
150 TABLET ORAL EVERY MORNING
Qty: 90 TABLET | Refills: 3 | Status: SHIPPED | OUTPATIENT
Start: 2024-08-02

## 2024-08-02 RX ORDER — METHYLPHENIDATE HYDROCHLORIDE 30 MG/1
CAPSULE, EXTENDED RELEASE ORAL
Qty: 30 CAPSULE | Refills: 0 | Status: CANCELLED | OUTPATIENT
Start: 2024-08-02

## 2024-08-02 RX ORDER — METHYLPHENIDATE HYDROCHLORIDE 30 MG/1
30 CAPSULE, EXTENDED RELEASE ORAL DAILY
Qty: 30 CAPSULE | Refills: 0 | Status: SHIPPED | OUTPATIENT
Start: 2024-09-01 | End: 2024-10-01

## 2024-08-02 NOTE — PROGRESS NOTES
"Nel is a 39 year old who is being evaluated via a billable telephone visit.    What phone number would you like to be contacted at? [phone]  How would you like to obtain your AVS? Tomás  Originating Location (pt. Location): Home    Distant Location (provider location):  On-site    Assessment & Plan     Attention deficit hyperactivity disorder (ADHD), combined type  Doing well, helping control symptoms without side effects.  Declines any adjustments   - methylphenidate (RITALIN LA) 30 MG 24 hr capsule; Take 1 capsule (30 mg) by mouth daily for 30 days  - methylphenidate (RITALIN LA) 30 MG 24 hr capsule; Take 1 capsule (30 mg) by mouth daily for 30 days  - methylphenidate (RITALIN LA) 30 MG 24 hr capsule; Take 1 capsule (30 mg) by mouth daily for 30 days    CONI (generalized anxiety disorder)  Stable, going well  - buPROPion (WELLBUTRIN XL) 150 MG 24 hr tablet; Take 1 tablet (150 mg) by mouth every morning    Dysthymia    - buPROPion (WELLBUTRIN XL) 150 MG 24 hr tablet; Take 1 tablet (150 mg) by mouth every morning    Visit for screening mammogram  Discussed starting screening since age 39  - MA Screening Bilateral w/ Gurjit; Future          BMI  Estimated body mass index is 34.58 kg/m  as calculated from the following:    Height as of 9/11/23: 1.651 m (5' 5\").    Weight as of 9/11/23: 94.3 kg (207 lb 12.8 oz).             Subjective   Nel is a 39 year old, presenting for the following health issues:  A.D.H.D        8/2/2024     3:25 PM   Additional Questions   Roomed by BRICE Araiza   Accompanied by n/a     ISABELHKING    History of Present Illness       Mental Health Follow-up:  Patient presents to follow-up on Depression & Anxiety.Patient's depression since last visit has been:  Good  The patient is not having other symptoms associated with depression.  Patient's anxiety since last visit has been:  Better  The patient is having other symptoms associated with anxiety.  Any significant life " events: No  Patient is feeling anxious or having panic attacks.  Patient has no concerns about alcohol or drug use.    Reason for visit:  ADHD Follow up. Med refill    She eats 2-3 servings of fruits and vegetables daily.She consumes 0 sweetened beverage(s) daily.She exercises with enough effort to increase her heart rate 60 or more minutes per day.  She exercises with enough effort to increase her heart rate 7 days per week. She is missing 2 dose(s) of medications per week.  She is not taking prescribed medications regularly due to remembering to take.               Review of Systems  Constitutional, HEENT, cardiovascular, pulmonary, gi and gu systems are negative, except as otherwise noted.      Objective           Vitals:  No vitals were obtained today due to virtual visit.    Physical Exam   General: Alert and no distress //Respiratory: No audible wheeze, cough, or shortness of breath // Psychiatric:  Appropriate affect, tone, and pace of words            Phone call duration: 7 minutes  Signed Electronically by: Jovanny Zayas PA-C

## 2024-08-05 RX ORDER — METHYLPHENIDATE HYDROCHLORIDE 30 MG/1
CAPSULE, EXTENDED RELEASE ORAL
Qty: 30 CAPSULE | Refills: 0 | OUTPATIENT
Start: 2024-08-05

## 2024-08-05 NOTE — TELEPHONE ENCOUNTER
Patient had appt w/ JS on 8/2.   Can we remove refill request?  Unable to do it on our end.   Thanks!  Zhanna MENDEZ

## 2024-12-12 DIAGNOSIS — F90.2 ATTENTION DEFICIT HYPERACTIVITY DISORDER (ADHD), COMBINED TYPE: ICD-10-CM

## 2024-12-12 RX ORDER — METHYLPHENIDATE HYDROCHLORIDE 30 MG/1
CAPSULE, EXTENDED RELEASE ORAL
Qty: 30 CAPSULE | Refills: 0 | Status: SHIPPED | OUTPATIENT
Start: 2024-12-12

## 2024-12-18 ENCOUNTER — PATIENT OUTREACH (OUTPATIENT)
Dept: CARE COORDINATION | Facility: CLINIC | Age: 39
End: 2024-12-18
Payer: COMMERCIAL

## 2025-01-25 DIAGNOSIS — F90.2 ATTENTION DEFICIT HYPERACTIVITY DISORDER (ADHD), COMBINED TYPE: ICD-10-CM

## 2025-02-11 ENCOUNTER — VIRTUAL VISIT (OUTPATIENT)
Dept: FAMILY MEDICINE | Facility: CLINIC | Age: 40
End: 2025-02-11
Payer: COMMERCIAL

## 2025-02-11 DIAGNOSIS — G43.809 OTHER MIGRAINE WITHOUT STATUS MIGRAINOSUS, NOT INTRACTABLE: ICD-10-CM

## 2025-02-11 DIAGNOSIS — F90.2 ATTENTION DEFICIT HYPERACTIVITY DISORDER (ADHD), COMBINED TYPE: Primary | ICD-10-CM

## 2025-02-11 PROCEDURE — 98006 SYNCH AUDIO-VIDEO EST MOD 30: CPT | Performed by: PHYSICIAN ASSISTANT

## 2025-02-11 RX ORDER — METHYLPHENIDATE HYDROCHLORIDE 30 MG/1
CAPSULE, EXTENDED RELEASE ORAL
Qty: 30 CAPSULE | Refills: 0 | OUTPATIENT
Start: 2025-02-11

## 2025-02-11 RX ORDER — RIZATRIPTAN BENZOATE 10 MG/1
10 TABLET, ORALLY DISINTEGRATING ORAL
Qty: 12 TABLET | Refills: 3 | Status: SHIPPED | OUTPATIENT
Start: 2025-02-11

## 2025-02-11 RX ORDER — LISDEXAMFETAMINE DIMESYLATE 40 MG/1
40 CAPSULE ORAL EVERY MORNING
Qty: 30 CAPSULE | Refills: 0 | Status: SHIPPED | OUTPATIENT
Start: 2025-02-11

## 2025-02-11 NOTE — PROGRESS NOTES
Nel is a 39 year old who is being evaluated via a billable video visit.    How would you like to obtain your AVS? MyChart  If the video visit is dropped, the invitation should be resent by: Text to cell phone: [phone]  Will anyone else be joining your video visit? No      Assessment & Plan     Other migraine without status migrainosus, not intractable  Stable, working well  - rizatriptan (MAXALT-MLT) 10 MG ODT; Take 1 tablet (10 mg) by mouth at onset of headache for migraine. May repeat in 2 hours. Max 3 tablets/24 hours.    Attention deficit hyperactivity disorder (ADHD), combined type  Will switch as she does not find that ritalin LA helpful any more.  Long days with work and school.  Will trial vyvanse.  - lisdexamfetamine (VYVANSE) 40 MG capsule; Take 1 capsule (40 mg) by mouth every morning.                Subjective   Nel is a 39 year old, presenting for the following health issues:  No chief complaint on file.      2/11/2025     8:59 AM   Additional Questions   Roomed by Vane NGUYEN   Accompanied by self     History of Present Illness       Reason for visit:  Med refill, change miko    She eats 2-3 servings of fruits and vegetables daily.She consumes 0 sweetened beverage(s) daily.She exercises with enough effort to increase her heart rate 60 or more minutes per day.  She exercises with enough effort to increase her heart rate 5 days per week. She is missing 2 dose(s) of medications per week.  She is not taking prescribed medications regularly due to remembering to take.     ADHD Follow-Up    Date of last ADHD office visit: 09/11/2023  Status since last visit: Worse-getting bad dreams and other things with medication not working as well.   Taking controlled (daily) medications as prescribed: No,                        Parent/Patient Concerns with Medications: discuss new medication or dosage, feels medication is not working for 6-7 months   ADHD Medication       Stimulants - Misc. Disp Start End      "methylphenidate (RITALIN LA) 30 MG 24 hr capsule 30 capsule 12/12/2024 --    Sig: Take 1 capsule (30 mg) by mouth once daily.    Class: E-Prescribe    Earliest Fill Date: 12/12/2024            Sleep: no problems  Home/Family Concerns: Stable  Peer Concerns: None    Co-Morbid Diagnosis: Depression and Anxiety    Currently in counseling: No        Medication Benefits:   Controlled symptoms: None  Uncontrolled Symptoms : Attention span, Distractability, and Finishing tasks    Medication side effects:  Side effects noted: none  Denies : appetite suppression, weight loss, and insomnia                  Review of Systems  Constitutional, HEENT, cardiovascular, pulmonary, gi and gu systems are negative, except as otherwise noted.      Objective    Vitals - Patient Reported  Weight (Patient Reported): 86.2 kg (190 lb)  Height (Patient Reported): 165.1 cm (5' 5\")  BMI (Based on Pt Reported Ht/Wt): 31.62        Physical Exam   GENERAL: alert and no distress  EYES: Eyes grossly normal to inspection.  No discharge or erythema, or obvious scleral/conjunctival abnormalities.  RESP: No audible wheeze, cough, or visible cyanosis.    SKIN: Visible skin clear. No significant rash, abnormal pigmentation or lesions.  NEURO: Cranial nerves grossly intact.  Mentation and speech appropriate for age.  PSYCH: Appropriate affect, tone, and pace of words          Video-Visit Details    Type of service:  Video Visit   Originating Location (pt. Location): Home    Distant Location (provider location):  Off-site  Platform used for Video Visit: Nieves  Signed Electronically by: Jovanny Zayas PA-C    "

## 2025-04-22 DIAGNOSIS — F90.2 ATTENTION DEFICIT HYPERACTIVITY DISORDER (ADHD), COMBINED TYPE: ICD-10-CM

## 2025-04-23 RX ORDER — LISDEXAMFETAMINE DIMESYLATE 40 MG/1
40 CAPSULE ORAL EVERY MORNING
Qty: 30 CAPSULE | Refills: 0 | Status: SHIPPED | OUTPATIENT
Start: 2025-04-23

## 2025-06-16 ENCOUNTER — LAB (OUTPATIENT)
Dept: LAB | Facility: CLINIC | Age: 40
End: 2025-06-16
Payer: COMMERCIAL

## 2025-06-16 DIAGNOSIS — R53.83 OTHER FATIGUE: ICD-10-CM

## 2025-06-16 DIAGNOSIS — Z13.6 CARDIOVASCULAR SCREENING; LDL GOAL LESS THAN 160: ICD-10-CM

## 2025-06-16 DIAGNOSIS — Z13.21 ENCOUNTER FOR VITAMIN DEFICIENCY SCREENING: ICD-10-CM

## 2025-06-16 DIAGNOSIS — Z11.1 SCREENING EXAMINATION FOR PULMONARY TUBERCULOSIS: ICD-10-CM

## 2025-06-16 LAB
ALBUMIN SERPL BCG-MCNC: 4.3 G/DL (ref 3.5–5.2)
ALP SERPL-CCNC: 60 U/L (ref 40–150)
ALT SERPL W P-5'-P-CCNC: 22 U/L (ref 0–50)
ANION GAP SERPL CALCULATED.3IONS-SCNC: 11 MMOL/L (ref 7–15)
AST SERPL W P-5'-P-CCNC: 22 U/L (ref 0–45)
BILIRUB SERPL-MCNC: 0.4 MG/DL
BUN SERPL-MCNC: 12.1 MG/DL (ref 6–20)
CALCIUM SERPL-MCNC: 9 MG/DL (ref 8.8–10.4)
CHLORIDE SERPL-SCNC: 104 MMOL/L (ref 98–107)
CHOLEST SERPL-MCNC: 175 MG/DL
CREAT SERPL-MCNC: 1.02 MG/DL (ref 0.51–0.95)
CRP SERPL-MCNC: <3 MG/L
EGFRCR SERPLBLD CKD-EPI 2021: 71 ML/MIN/1.73M2
ERYTHROCYTE [DISTWIDTH] IN BLOOD BY AUTOMATED COUNT: 12.3 % (ref 10–15)
FASTING STATUS PATIENT QL REPORTED: NO
FASTING STATUS PATIENT QL REPORTED: NO
FERRITIN SERPL-MCNC: 88 NG/ML (ref 6–175)
GLUCOSE SERPL-MCNC: 79 MG/DL (ref 70–99)
HCO3 SERPL-SCNC: 24 MMOL/L (ref 22–29)
HCT VFR BLD AUTO: 39.5 % (ref 35–47)
HDLC SERPL-MCNC: 77 MG/DL
HGB BLD-MCNC: 13.2 G/DL (ref 11.7–15.7)
IRON BINDING CAPACITY (ROCHE): 215 UG/DL (ref 240–430)
IRON SATN MFR SERPL: 33 % (ref 15–46)
IRON SERPL-MCNC: 72 UG/DL (ref 37–145)
LDLC SERPL CALC-MCNC: 86 MG/DL
MCH RBC QN AUTO: 28.9 PG (ref 26.5–33)
MCHC RBC AUTO-ENTMCNC: 33.4 G/DL (ref 31.5–36.5)
MCV RBC AUTO: 87 FL (ref 78–100)
NONHDLC SERPL-MCNC: 98 MG/DL
PLATELET # BLD AUTO: 224 10E3/UL (ref 150–450)
POTASSIUM SERPL-SCNC: 4.2 MMOL/L (ref 3.4–5.3)
PROT SERPL-MCNC: 6.9 G/DL (ref 6.4–8.3)
RBC # BLD AUTO: 4.56 10E6/UL (ref 3.8–5.2)
SODIUM SERPL-SCNC: 139 MMOL/L (ref 135–145)
TRIGL SERPL-MCNC: 62 MG/DL
TSH SERPL DL<=0.005 MIU/L-ACNC: 1.42 UIU/ML (ref 0.3–4.2)
VIT B12 SERPL-MCNC: 664 PG/ML (ref 232–1245)
VIT D+METAB SERPL-MCNC: 100 NG/ML (ref 20–50)
WBC # BLD AUTO: 8 10E3/UL (ref 4–11)

## 2025-06-16 PROCEDURE — 86140 C-REACTIVE PROTEIN: CPT

## 2025-06-16 PROCEDURE — 86481 TB AG RESPONSE T-CELL SUSP: CPT

## 2025-06-16 PROCEDURE — 83550 IRON BINDING TEST: CPT

## 2025-06-16 PROCEDURE — 82728 ASSAY OF FERRITIN: CPT

## 2025-06-16 PROCEDURE — 83540 ASSAY OF IRON: CPT

## 2025-06-16 PROCEDURE — 85027 COMPLETE CBC AUTOMATED: CPT

## 2025-06-16 PROCEDURE — 80061 LIPID PANEL: CPT

## 2025-06-16 PROCEDURE — 82607 VITAMIN B-12: CPT

## 2025-06-16 PROCEDURE — 80053 COMPREHEN METABOLIC PANEL: CPT

## 2025-06-16 PROCEDURE — 36415 COLL VENOUS BLD VENIPUNCTURE: CPT

## 2025-06-16 PROCEDURE — 82306 VITAMIN D 25 HYDROXY: CPT

## 2025-06-16 PROCEDURE — 84443 ASSAY THYROID STIM HORMONE: CPT

## 2025-06-18 LAB
GAMMA INTERFERON BACKGROUND BLD IA-ACNC: 0.02 IU/ML
M TB IFN-G BLD-IMP: NEGATIVE
M TB IFN-G CD4+ BCKGRND COR BLD-ACNC: 9.98 IU/ML
MITOGEN IGNF BCKGRD COR BLD-ACNC: 0 IU/ML
MITOGEN IGNF BCKGRD COR BLD-ACNC: 0.01 IU/ML
QUANTIFERON MITOGEN: 10 IU/ML
QUANTIFERON NIL TUBE: 0.02 IU/ML
QUANTIFERON TB1 TUBE: 0.03 IU/ML
QUANTIFERON TB2 TUBE: 0.02

## 2025-06-20 ENCOUNTER — VIRTUAL VISIT (OUTPATIENT)
Dept: FAMILY MEDICINE | Facility: CLINIC | Age: 40
End: 2025-06-20
Payer: COMMERCIAL

## 2025-06-20 DIAGNOSIS — N95.1 PERIMENOPAUSE: Primary | ICD-10-CM

## 2025-06-20 DIAGNOSIS — E67.3 HIGH VITAMIN D LEVEL: ICD-10-CM

## 2025-06-20 PROCEDURE — 98005 SYNCH AUDIO-VIDEO EST LOW 20: CPT | Performed by: PHYSICIAN ASSISTANT

## 2025-06-20 RX ORDER — MULTIVITAMIN WITH IRON
1 TABLET ORAL DAILY
COMMUNITY

## 2025-06-20 ASSESSMENT — PATIENT HEALTH QUESTIONNAIRE - PHQ9
10. IF YOU CHECKED OFF ANY PROBLEMS, HOW DIFFICULT HAVE THESE PROBLEMS MADE IT FOR YOU TO DO YOUR WORK, TAKE CARE OF THINGS AT HOME, OR GET ALONG WITH OTHER PEOPLE: SOMEWHAT DIFFICULT
SUM OF ALL RESPONSES TO PHQ QUESTIONS 1-9: 6
SUM OF ALL RESPONSES TO PHQ QUESTIONS 1-9: 6

## 2025-06-20 ASSESSMENT — ANXIETY QUESTIONNAIRES
IF YOU CHECKED OFF ANY PROBLEMS ON THIS QUESTIONNAIRE, HOW DIFFICULT HAVE THESE PROBLEMS MADE IT FOR YOU TO DO YOUR WORK, TAKE CARE OF THINGS AT HOME, OR GET ALONG WITH OTHER PEOPLE: SOMEWHAT DIFFICULT
GAD7 TOTAL SCORE: 5
7. FEELING AFRAID AS IF SOMETHING AWFUL MIGHT HAPPEN: NOT AT ALL
6. BECOMING EASILY ANNOYED OR IRRITABLE: SEVERAL DAYS
GAD7 TOTAL SCORE: 5
4. TROUBLE RELAXING: MORE THAN HALF THE DAYS
3. WORRYING TOO MUCH ABOUT DIFFERENT THINGS: NOT AT ALL
8. IF YOU CHECKED OFF ANY PROBLEMS, HOW DIFFICULT HAVE THESE MADE IT FOR YOU TO DO YOUR WORK, TAKE CARE OF THINGS AT HOME, OR GET ALONG WITH OTHER PEOPLE?: SOMEWHAT DIFFICULT
7. FEELING AFRAID AS IF SOMETHING AWFUL MIGHT HAPPEN: NOT AT ALL
GAD7 TOTAL SCORE: 5
5. BEING SO RESTLESS THAT IT IS HARD TO SIT STILL: SEVERAL DAYS
2. NOT BEING ABLE TO STOP OR CONTROL WORRYING: NOT AT ALL
1. FEELING NERVOUS, ANXIOUS, OR ON EDGE: SEVERAL DAYS

## 2025-06-20 NOTE — PROGRESS NOTES
Nel is a 40 year old who is being evaluated via a billable video visit.    How would you like to obtain your AVS? MyChart  If the video visit is dropped, the invitation should be resent by: Text to cell phone: [phone]  Will anyone else be joining your video visit? No      Assessment & Plan     Perimenopause  Patient is due for well exam with pap along with discussion about HRT.  She will follow up with GYN  - Follicle stimulating hormone; Future  - Luteinizing Hormone; Future  - Estradiol; Future  - Ob/Gyn  Referral; Future    High vitamin D level  Has stopped supplement.                Subjective   Nel is a 40 year old, presenting for the following health issues:  Results (Lab results/)        6/20/2025    11:17 AM   Additional Questions   Roomed by Mitzi FINE     History of Present Illness       Reason for visit:  Lab results.  Perimenopause sx  Symptom onset:  More than a month  Symptoms include:  Hot flashes, sleeping difficulties, weight gain  Symptom intensity:  Moderate  Symptom progression:  Worsening  Had these symptoms before:  Yes  Has tried/received treatment for these symptoms:  No  What makes it worse:  Headaches  What makes it better:  No   She is taking medications regularly.                Review of Systems  Constitutional, HEENT, cardiovascular, pulmonary, gi and gu systems are negative, except as otherwise noted.      Objective           Vitals:  No vitals were obtained today due to virtual visit.    Physical Exam   GENERAL: alert and no distress  EYES: Eyes grossly normal to inspection.  No discharge or erythema, or obvious scleral/conjunctival abnormalities.  RESP: No audible wheeze, cough, or visible cyanosis.    SKIN: Visible skin clear. No significant rash, abnormal pigmentation or lesions.  NEURO: Cranial nerves grossly intact.  Mentation and speech appropriate for age.  PSYCH: Appropriate affect, tone, and pace of words          Video-Visit Details    Type of service:   Video Visit   Originating Location (pt. Location): Home    Distant Location (provider location):  On-site  Platform used for Video Visit: Nieves  Signed Electronically by: Jovanny Zayas PA-C

## 2025-06-23 ENCOUNTER — PATIENT OUTREACH (OUTPATIENT)
Dept: CARE COORDINATION | Facility: CLINIC | Age: 40
End: 2025-06-23
Payer: COMMERCIAL

## 2025-06-25 ENCOUNTER — PATIENT OUTREACH (OUTPATIENT)
Dept: CARE COORDINATION | Facility: CLINIC | Age: 40
End: 2025-06-25
Payer: COMMERCIAL

## 2025-07-12 ENCOUNTER — HEALTH MAINTENANCE LETTER (OUTPATIENT)
Age: 40
End: 2025-07-12

## 2025-07-16 ENCOUNTER — OFFICE VISIT (OUTPATIENT)
Dept: OBGYN | Facility: CLINIC | Age: 40
End: 2025-07-16
Payer: COMMERCIAL

## 2025-07-16 VITALS
HEART RATE: 90 BPM | BODY MASS INDEX: 35.77 KG/M2 | OXYGEN SATURATION: 100 % | WEIGHT: 214.7 LBS | DIASTOLIC BLOOD PRESSURE: 84 MMHG | SYSTOLIC BLOOD PRESSURE: 137 MMHG | HEIGHT: 65 IN

## 2025-07-16 DIAGNOSIS — Z01.419 ENCOUNTER FOR GYNECOLOGICAL EXAMINATION WITHOUT ABNORMAL FINDING: Primary | ICD-10-CM

## 2025-07-16 DIAGNOSIS — N80.9 ENDOMETRIOSIS: ICD-10-CM

## 2025-07-16 DIAGNOSIS — Z12.31 ENCOUNTER FOR SCREENING MAMMOGRAM FOR BREAST CANCER: ICD-10-CM

## 2025-07-16 DIAGNOSIS — Z12.4 CERVICAL CANCER SCREENING: ICD-10-CM

## 2025-07-16 DIAGNOSIS — N95.1 PERIMENOPAUSE: ICD-10-CM

## 2025-07-16 RX ORDER — ESTRADIOL 0.05 MG/D
1 PATCH, EXTENDED RELEASE TRANSDERMAL
Qty: 8 PATCH | Refills: 12 | Status: SHIPPED | OUTPATIENT
Start: 2025-07-17

## 2025-07-16 RX ORDER — PROGESTERONE 100 MG/1
100 CAPSULE ORAL DAILY
Qty: 90 CAPSULE | Refills: 4 | Status: SHIPPED | OUTPATIENT
Start: 2025-07-16

## 2025-07-16 ASSESSMENT — ANXIETY QUESTIONNAIRES
3. WORRYING TOO MUCH ABOUT DIFFERENT THINGS: NOT AT ALL
7. FEELING AFRAID AS IF SOMETHING AWFUL MIGHT HAPPEN: NOT AT ALL
6. BECOMING EASILY ANNOYED OR IRRITABLE: NEARLY EVERY DAY
5. BEING SO RESTLESS THAT IT IS HARD TO SIT STILL: SEVERAL DAYS
GAD7 TOTAL SCORE: 7
2. NOT BEING ABLE TO STOP OR CONTROL WORRYING: NOT AT ALL
IF YOU CHECKED OFF ANY PROBLEMS ON THIS QUESTIONNAIRE, HOW DIFFICULT HAVE THESE PROBLEMS MADE IT FOR YOU TO DO YOUR WORK, TAKE CARE OF THINGS AT HOME, OR GET ALONG WITH OTHER PEOPLE: SOMEWHAT DIFFICULT
GAD7 TOTAL SCORE: 7
1. FEELING NERVOUS, ANXIOUS, OR ON EDGE: SEVERAL DAYS

## 2025-07-16 ASSESSMENT — PATIENT HEALTH QUESTIONNAIRE - PHQ9
5. POOR APPETITE OR OVEREATING: MORE THAN HALF THE DAYS
SUM OF ALL RESPONSES TO PHQ QUESTIONS 1-9: 9

## 2025-07-16 NOTE — PROGRESS NOTES
Nel is a 40 year old  female who presents for annual exam.     Menses are n/a and n/a lasting n/a days.  Menses flow: normal and n/a.  Patient's last menstrual period was 2013.. Using hysterectomy for contraception.  She is not currently considering pregnancy.  Medic overnights. Gym sometimes, walks, gardens.   Does not use alcohol, tobacco, or other drugs.   Besides routine health maintenance,  she would like to discuss perimenopause symptoms such as hot flashes weight gained (25lbs since April), bad dreams, sleep disturbance. Almost daily night flashes, sleep disturbances, locking joints, brain fog, lots of hair loss, dry eyes. Endometriosis symptoms still flaring even with no uterus. Has occasional spotting with PMS symptoms. Wondering if this is normal.  GYNECOLOGIC HISTORY:  Menarche: 12  Age at first intercourse: 17 Number of lifetime partners: more than 5  Nel is sexually active with 1 male partner(s) and is currently in monogamous relationship with Torrey.    History sexually transmitted infections:No STD history  STI testing offered?  Declined  RODNEY exposure: No  History of abnormal Pap smear: Status post hysterectomy with removal of cervix and no history of CIN2 or greater or cervical cancer. Health Maintenance and Surgical History updated.  Family history of breast CA: No  Family history of uterine/ovarian CA: No    Family history of colon CA: No    HEALTH MAINTENANCE:  Cholesterol: (  Cholesterol   Date Value Ref Range Status   2025 175 <200 mg/dL Final   2010 130 0 - 200 mg/dL Final     Comment:     LDL Cholesterol is the primary guide to therapy.   The NCEP recommends further evaluation of: patients with cholesterol <200   mg/dL   if additional risk factors are present, cholesterol >240 mg/dL, triglycerides   >150 mg/dL, or HDL <40 mg/dL.    History of abnormal lipids: No  Mammo: n/a . History of abnormal Mammo: Not applicable.  Regular Self Breast Exams:  No  Calcium/Vitamin D intake: source:  dietary supplement(s) Adequate? Yes  TSH: (  TSH   Date Value Ref Range Status   2025 1.42 0.30 - 4.20 uIU/mL Final   2013 1.40 0.4 - 5.0 mU/L Final    )  Pap; (  Lab Results   Component Value Date    PAP ASC-US 2014    PAP NIL 2010    PAP NIL 2009    )    HISTORY:  OB History    Para Term  AB Living   2 2 2 0 0 2   SAB IAB Ectopic Multiple Live Births   0 0 0 0 2      # Outcome Date GA Lbr Seamus/2nd Weight Sex Type Anes PTL Lv   2 Term 06/01/10 39w1d  3.459 kg (7 lb 10 oz) F CS-Unspec   ALEXI      Apgar1: 8  Apgar5: 9   1 Term 09 40w0d  3.204 kg (7 lb 1 oz) F CS-Unspec   ALEXI     Past Medical History:   Diagnosis Date    ADD (attention deficit disorder)     ASCUS favor benign 2014    neg hpv cotest in 3 yrs    depression     Not currently on meds    Depressive disorder     History of blood transfusion 2013    6 units after hysterectomy    Menarche age 12    Pelvic pain      Past Surgical History:   Procedure Laterality Date    BACK SURGERY  10/2022, 2022    L4,5,S1    BIOPSY      BLADDER SURGERY  2015    C/SECTION, LOW TRANSVERSE        x2    CERVICECTOMY (TRACHELECTOMY) N/A 2015    not done    COLONOSCOPY  2014    Procedure: COMBINED COLONOSCOPY, SINGLE BIOPSY/POLYPECTOMY BY BIOPSY;  Surgeon: Elizabeth Shepard MD;  Location:  GI    CYSTOSCOPY  2013    Procedure: CYSTOSCOPY;;  Surgeon: Mariel Smith MD;  [facility]    DAVINCI NEPHRECTOMY PARTIAL Left 2022    Procedure: NEPHRECTOMY, PARTIAL, ROBOT-ASSISTED, LAPAROSCOPIC WITH INTRAOPERATIVE ULTRASOUND;  Surgeon: Matthew Aranda MD;  Location:  OR    LABIALPLASTY  2013    Procedure: LABIALPLASTY;;  Surgeon: Leticia Staley MD;  [facility]    LAMINECTOMY LUMBAR ONE LEVEL Right 2023    Procedure: Right lumbar 5 to sacral 1 foraminotomy;  Surgeon: Torrey Levi MD;  Location:  OR     LAPAROSCOPIC HYSTERECTOMY SUPRACERVICAL  04/04/2013    simple hyperplasiaProcedure: LAPAROSCOPIC HYSTERECTOMY SUPRACERVICAL;  Laparoscopic Supracervical Hysterectomy, left labialplasty;  Surgeon: Leticia Staley MD;  [facility]    LAPAROSCOPIC LYSIS ADHESIONS N/A 01/09/2015    Procedure: LAPAROSCOPIC LYSIS ADHESIONS;  Surgeon: Sue Johnston MD;  [facility]    LAPAROSCOPIC TUBAL LIGATION  03/07/2013    Procedure: LAPAROSCOPIC TUBAL LIGATION;  Bilateral Laparoscopic Tubal Ligation With Intrauterine Device Removal ;  Surgeon: Jaime Patrick MD;  [facility]    LAPAROSCOPY DIAGNOSTIC (GYN)  04/05/2013    Procedure: LAPAROSCOPY DIAGNOSTIC (GYN);;  Surgeon: Mariel Smith MD;  [facility]    LAPAROSCOPY OPERATIVE ADULT N/A 01/09/2015    Procedure: LAPAROSCOPY OPERATIVE ADULT;  Surgeon: Sue Johnston MD;  [facility]    LAPAROTOMY EXPLORATORY  04/05/2013    Procedure: LAPAROTOMY EXPLORATORY;  ligasure of pedicles and cervical stump;  Surgeon: Mariel Smith MD;  [facility]     Family History   Problem Relation Age of Onset    Gastrointestinal Disease Mother         colon polyps    Hypertension Mother     Gastrointestinal Disease Father         colon polyps    Family History Negative Other     Cancer - colorectal Maternal Grandmother         thinks grandparents on both sides had colon cancer, unsure of details    Cancer - colorectal Paternal Grandmother     Substance Abuse Brother      Social History     Socioeconomic History    Marital status: Single    Number of children: 0   Occupational History    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 No   Social History Narrative                                                           Current Outpatient Medications:     buPROPion (WELLBUTRIN XL) 150 MG 24 hr tablet, Take 1 tablet (150 mg) by mouth every morning, Disp: 90 tablet, Rfl: 3    cetirizine (ZYRTEC) 10 MG tablet, Take 10 mg by mouth daily, Disp: , Rfl:     lisdexamfetamine (VYVANSE) 40 MG capsule, Take 1 capsule (40 mg) by mouth every morning., Disp: 30 capsule, Rfl: 0    magnesium 250 MG tablet, Take 1 tablet by mouth daily., Disp: , Rfl:     ondansetron (ZOFRAN ODT) 4 MG ODT tab, Dissolve 1 tablet (4 mg) in mouth every 8 (eight) hours as needed for nausea, Disp: 18 tablet, Rfl: 0    Prenatal Vit-Fe Fumarate-FA (PRENATAL VITAMIN) 27-0.8 MG TABS, Take 1 tablet by mouth daily, Disp: , Rfl:     rizatriptan (MAXALT-MLT) 10 MG ODT, Take 1 tablet (10 mg) by mouth at onset of headache for migraine. May repeat in 2 hours. Max 3 tablets/24 hours., Disp: 12 tablet, Rfl: 3    vitamin C with B complex (B COMPLEX-C) tablet, Take 1 tablet by mouth daily, Disp: , Rfl:      Allergies   Allergen Reactions    Gluten Meal Other (See Comments)     Intolerance    Iodine      Topical application    Liquid Adhesive Other (See Comments)     DERMABOND- caused skin sloughing       Past medical, surgical, social and family history were reviewed and updated in [facility].      EXAM:  /84   Pulse 90   Ht 1.651 m (5' 5\")   Wt 97.4 kg (214 lb 11.2 oz)   LMP 03/09/2013   SpO2 100%   BMI 35.73 kg/m     BMI: Body mass index is 35.73 kg/m .  ASCVD risk 0.3%    Constitutional: healthy, alert and no distress  Head: Normocephalic. No masses, lesions, tenderness or abnormalities  Neck: Neck supple. Trachea midline. No adenopathy. Thyroid symmetric, normal size.   Cardiovascular: RRR.   Respiratory: Negative.   Breast: Breasts reveal mild " symmetric fibrocystic densities, but there are no dominant, discrete, fixed or suspicious masses found.  Gastrointestinal: Abdomen soft, non-tender, non-distended. No masses, organomegaly.  :  Vulva:  No external lesions, normal female hair distribution, no inguinal adenopathy.    Urethra:  Midline, non-tender, well supported, no discharge  Vagina:  Moist, pink, no abnormal discharge, no lesions  Uterus:  absent  Cervix: Pink, no abnormal discharge noted, pap smear collected  Ovaries:  No masses appreciated, non-tender, mobile  Rectal Exam: deferred  Musculoskeletal: extremities normal  Skin: no suspicious lesions or rashes  Psychiatric: Affect appropriate, cooperative,mentation appears normal.     ASSESSMENT:  40 year old female with satisfactory annual exam    (Z01.419) Encounter for gynecological examination without abnormal finding  (primary encounter diagnosis)  Comment: without abnormal findings  Plan: RTC in one year or PRN    (Z12.4) Cervical cancer screening  Comment: collected, history of hysterectomy with cervix intact. Hx ASCUS  Plan: HPV and Gynecologic Cytology Panel            (Z12.31) Encounter for screening mammogram for breast cancer  Comment: ordered  Plan: MA Screen Bilateral w/Gurjit            (N95.1) Perimenopause  Comment: Discussed MHT, that systemic estrogen therapy is the gold standard and FDA approved for treating vasomotor symptoms (hot flashes/night sweats) and osteoporosis while local estrogen therapy is FDA approved to treat genitourinary syndrome of menopause (GSM).  When using systemic estrogen MHT there is a rare increased risk of breast cancer, but considered protective for bone health, cardiovascular health, brain health, and decreasing all cause mortality.    -Reviewed the difference in levels of estrogen between contraceptives which is four times the dose of the estrogen in menopause hormone therapy   Discussed progesterone normally used for endometrial protection- she does not  have a uterus but may experience positive results taking progesterone at night for sleep disturbances.  Plan: estradiol (VIVELLE-DOT) 0.05 MG/24HR bi-weekly         patch, progesterone (PROMETRIUM) 100 MG capsule            (N80.9) Endometriosis  Comment: Discussed endometriosis flares. At this point, would recommend endometriosis experts. Will find that information and send to her through [employer].  Plan: Consult with endometriosis expert    RTC in one year or PRN for MHRT check    SYDNI Mixon CNP

## 2025-07-16 NOTE — PATIENT INSTRUCTIONS
Gio Neumann, here are some endometriosis experts in [place], after talking with my OB/GYN colleagues.    Dr. Denise at [facility]  Dr. Esteves in [place], [place]  Dr. Deleon at [place]      Things to Know About Hormone Therapy     Menopause is a normal life event for women - it is not an illness or medical condition.  Every person's experience of menopause is different.  Some women have many symptoms that interfere with life, and others may not experience any charge other than their period stops.    Many women with symptoms often suffer in silence and do not realize how effective and safe hormone therapy can be to improving their symptoms and quality of life.  Most common symptoms  Hot flashes  Night Sweats  Painful sex and/or dry vagina    The most effective way to treat hot flashes, night sweats and painful dry vagina is with hormone therapy.      There are three categories of hormone therapy    If you are still having periods, and it is safe for you, a low dose birth control pill with both estrogen and progesterone can work very well to provide contraception and relieve symptoms.      Estrogen Therapy or ET  means estrogen only therapy.  Estrogen is the hormone that provides the most menopausal symptom relief.  It is a much lower dose than used in a low dose birth control pill.  Estrogen only therapy is only for women who had had their uterus removed with a surgery.    Estrogen Progesterone Therapy or EPT means taking both estrogen and progesterone.  The progesterone protects the uterus from developing uterine cancer from estrogen alone.  This can be from taking a pill, having a Kyleena or Mirena IUD or using a combined product      There are two basic ways to use hormone therapy:    Systemic which means to circulate in the blood stream to all parts of the body.  This is given either as an oral tablet, a patch, a vaginal ring, or a vaginal cream.  Systemic hormones are used to treat hot flashes and night sweats     Local  which means the product only affects a specific or localized area of the body.  This can be given as a cream, a ring or a tablet and is used to treat vaginal symptoms of menopause.  Less risk because the blood level of estrogen and progesterone is not increasing    Fermin time for using systemic hormone therapy    You are having hot flashes and/or night sweats  You are within 10 years of menopause and under age 60    Benefits of using systemic hormone therapy may include    Effectively treats hot flashes, night sweats and vaginal dryness and pain  Helps prevent bone loss  May improve mood swings  May improve sex drive or low libido   Helps reduce risk of future cardiovascular disease, type 2 Diabetes, osteoarthritis, and dementia when used within 10 yrs of menopause     Risks of using systemic hormone therapy     Increased risk of developing a clot, rare, and less increase when using a patch  Increased risk of breast cancer, about the same increase in risks as having a glass or two of wine each night or being overweight.  When using Prometrium, the same progesterone hormone the ovary makes, there is less of an increase in risk     Side Effects     You may have vaginal bleeding again initially, if bleeding persists beyond 6 months please let us know so we can screen you for uterine cancer  Breast tenderness     For more information about Menopause    A book Hot and Bothered by Michael Iniguez  A book The [place] by Dr Sue Vences  A podcast Nancy Henry 'From PMS to Menopause: How to Hack Your Hormones'   The book The New Rules of Menopause; A [facility] Guide to Perimenopause and Beyond by Dr Stefanie Guillory  The North American Menopause Society   The [facility] - Menopause       References:   Menopause Practice; A Clinician's Guide, 6th Edition, The North American Menopause Society   The New Rules of Menopause; A [facility] Guide to Perimenopause and Beyond, Stefanie Guillory M.D., M.B.A., Director  of [facility]     Compiled by Devi TROY CNM, MSCP (Menopause Society Certified Practitioner) 1/9/2024

## 2025-07-17 LAB
HPV HR 12 DNA CVX QL NAA+PROBE: NEGATIVE
HPV16 DNA CVX QL NAA+PROBE: NEGATIVE
HPV18 DNA CVX QL NAA+PROBE: NEGATIVE
HUMAN PAPILLOMA VIRUS FINAL DIAGNOSIS: NORMAL

## 2025-07-21 LAB
BKR AP ASSOCIATED HPV REPORT: NORMAL
BKR LAB AP GYN ADEQUACY: NORMAL
BKR LAB AP GYN INTERPRETATION: NORMAL
BKR LAB AP PREVIOUS ABNL DX: NORMAL
BKR LAB AP PREVIOUS ABNORMAL: NORMAL
PATH REPORT.COMMENTS IMP SPEC: NORMAL
PATH REPORT.COMMENTS IMP SPEC: NORMAL
PATH REPORT.RELEVANT HX SPEC: NORMAL

## 2025-08-10 DIAGNOSIS — F90.2 ATTENTION DEFICIT HYPERACTIVITY DISORDER (ADHD), COMBINED TYPE: ICD-10-CM

## 2025-08-14 RX ORDER — LISDEXAMFETAMINE DIMESYLATE 40 MG/1
40 CAPSULE ORAL EVERY MORNING
Qty: 30 CAPSULE | Refills: 0 | Status: SHIPPED | OUTPATIENT
Start: 2025-08-14

## 2025-09-04 DIAGNOSIS — F90.2 ATTENTION DEFICIT HYPERACTIVITY DISORDER (ADHD), COMBINED TYPE: ICD-10-CM

## 2025-09-04 DIAGNOSIS — F34.1 DYSTHYMIA: ICD-10-CM

## 2025-09-04 DIAGNOSIS — F41.1 GAD (GENERALIZED ANXIETY DISORDER): ICD-10-CM

## 2025-09-04 RX ORDER — BUPROPION HYDROCHLORIDE 150 MG/1
150 TABLET ORAL EVERY MORNING
Qty: 90 TABLET | Refills: 3 | Status: SHIPPED | OUTPATIENT
Start: 2025-09-04

## 2025-09-04 RX ORDER — LISDEXAMFETAMINE DIMESYLATE 40 MG/1
40 CAPSULE ORAL EVERY MORNING
Qty: 30 CAPSULE | Refills: 0 | Status: SHIPPED | OUTPATIENT
Start: 2025-09-04

## (undated) DEVICE — BNDG COBAN 4"X5YDS STERILE

## (undated) DEVICE — ESU PENCIL W/HOLSTER E2350H

## (undated) DEVICE — RX SURGIFLO HEMOSTATIC MATRIX W/THROMBIN 8ML 2994

## (undated) DEVICE — DRSG GAUZE 4X4" 8044

## (undated) DEVICE — ESU GROUND PAD UNIVERSAL W/O CORD

## (undated) DEVICE — CLIP ENDO HEMO-LOC PURPLE LG 544240

## (undated) DEVICE — SUCTION IRR STRYKERFLOW II W/TIP 250-070-520

## (undated) DEVICE — DAVINCI XI DRAPE COLUMN 470341

## (undated) DEVICE — PACK SPINE SM CUSTOM SNE15SSFSK

## (undated) DEVICE — SU VICRYL 0 CT-2 CR 8X18" J727D

## (undated) DEVICE — SU MONOCRYL 4-0 PS-2 18" UND Y496G

## (undated) DEVICE — GLOVE BIOGEL PI ULTRATOUCH SZ 6.5 41165

## (undated) DEVICE — SU WND CLOSURE VLOC 180 ABS 2-0 12" GS-21 VLOCL0315

## (undated) DEVICE — GLOVE PROTEXIS W/NEU-THERA 7.5  2D73TE75

## (undated) DEVICE — SU VICRYL 0 UR-6 27" J603H

## (undated) DEVICE — TUBING CONMED AIRSEAL SMOKE EVAC INSUFFLATION ASM-EVAC

## (undated) DEVICE — DAVINCI XI OBTURATOR BLADELESS 8MM 470359

## (undated) DEVICE — VESSEL LOOPS YELLOW MINI 31145660

## (undated) DEVICE — PREP CHLORAPREP 26ML TINTED HI-LITE ORANGE 930815

## (undated) DEVICE — SPONGE SURGIFOAM 12 1972

## (undated) DEVICE — SU VICRYL 2-0 CT-2 CR 8X18" J726D

## (undated) DEVICE — SU VICRYL 0 TIE 12X18" J906G

## (undated) DEVICE — GLOVE PROTEXIS W/NEU-THERA 8.0  2D73TE80

## (undated) DEVICE — PROTECTOR ARM ONE-STEP TRENDELENBURG 40418

## (undated) DEVICE — LINEN TOWEL PACK X5 5464

## (undated) DEVICE — DAVINCI XI SEAL UNIVERSAL 5-8MM 470361

## (undated) DEVICE — GLOVE PROTEXIS BLUE W/NEU-THERA 6.0  2D73EB60

## (undated) DEVICE — SUCTION CANISTER MEDIVAC LINER 3000ML W/LID 65651-530

## (undated) DEVICE — SU SILK 0 FSL 18" 678H

## (undated) DEVICE — DRAPE SHEET REV FOLD 3/4 9349

## (undated) DEVICE — SYR 10ML FINGER CONTROL W/O NDL 309695

## (undated) DEVICE — DRAPE MAYO STAND 23X54 8337

## (undated) DEVICE — NDL SPINAL 18GA 3.5" 405184

## (undated) DEVICE — POSITIONER PT PRONESAFE HEAD REST W/DERMAPROX INSERT 40599

## (undated) DEVICE — SOL WATER IRRIG 1000ML BOTTLE 2F7114

## (undated) DEVICE — SPONGE COTTONOID 1/2X1/2" 80-1400

## (undated) DEVICE — SU VICRYL 0 CT-1 27" J340H

## (undated) DEVICE — SU WND CLOSURE VLOC 90 ABS 3-0 VIOLET 6" CV-23 VLOCM0804

## (undated) DEVICE — ENDO TROCAR FIRST ENTRY KII FIOS Z-THRD 05X100MM CTF03

## (undated) DEVICE — DRAPE MICROSCOPE LEICA 54X150" AR8033650

## (undated) DEVICE — DAVINCI XI DRAPE ARM 470015

## (undated) DEVICE — GLOVE BIOGEL PI MICRO INDICATOR UNDERGLOVE SZ 7.0 48970

## (undated) DEVICE — NDL INSUFFLATION 120MM VERRES

## (undated) DEVICE — DRAPE IOBAN ISOLATION VERTICAL 320X21CM 6617

## (undated) DEVICE — TAPE DURAPORE 3" SILK 1538-3

## (undated) DEVICE — DRAPE COVER C-ARM SEAMLESS SNAP-KAP 03-KP26 LATEX FREE

## (undated) DEVICE — SU VICRYL 2-0 CT-2 27" UND J269H

## (undated) DEVICE — TOOL DISSECT MIDAS MR8 12CM TELESC MATCH DMD MR8-T12MH30D

## (undated) DEVICE — SOL NACL 0.9% INJ 1000ML BAG 2B1324X

## (undated) DEVICE — SURGICEL HEMOSTAT 2X14" 1951

## (undated) DEVICE — ENDO POUCH UNIV RETRIEVAL SYSTEM INZII 10MM CD001

## (undated) DEVICE — TUBING SUCTION MEDI-VAC SOFT 3/16"X20' N520A

## (undated) DEVICE — SOL NACL 0.9% IRRIG 1000ML BOTTLE 2F7124

## (undated) DEVICE — SU MONOCRYL 3-0 PS-2 27" Y427H

## (undated) DEVICE — PACK DAVINCI UROLOGY SBA15UDFSG

## (undated) DEVICE — DAVINCI HOT SHEARS TIP COVER  400180

## (undated) RX ORDER — FENTANYL CITRATE 0.05 MG/ML
INJECTION, SOLUTION INTRAMUSCULAR; INTRAVENOUS
Status: DISPENSED
Start: 2023-07-05

## (undated) RX ORDER — ONDANSETRON 2 MG/ML
INJECTION INTRAMUSCULAR; INTRAVENOUS
Status: DISPENSED
Start: 2022-03-08

## (undated) RX ORDER — VECURONIUM BROMIDE 1 MG/ML
INJECTION, POWDER, LYOPHILIZED, FOR SOLUTION INTRAVENOUS
Status: DISPENSED
Start: 2022-03-08

## (undated) RX ORDER — ACETAMINOPHEN 325 MG/1
TABLET ORAL
Status: DISPENSED
Start: 2023-07-05

## (undated) RX ORDER — DEXMEDETOMIDINE HYDROCHLORIDE 4 UG/ML
INJECTION, SOLUTION INTRAVENOUS
Status: DISPENSED
Start: 2022-03-08

## (undated) RX ORDER — FENTANYL CITRATE 50 UG/ML
INJECTION, SOLUTION INTRAMUSCULAR; INTRAVENOUS
Status: DISPENSED
Start: 2023-07-05

## (undated) RX ORDER — FENTANYL CITRATE 50 UG/ML
INJECTION, SOLUTION INTRAMUSCULAR; INTRAVENOUS
Status: DISPENSED
Start: 2022-03-08

## (undated) RX ORDER — HYDROMORPHONE HCL IN WATER/PF 6 MG/30 ML
PATIENT CONTROLLED ANALGESIA SYRINGE INTRAVENOUS
Status: DISPENSED
Start: 2022-03-08

## (undated) RX ORDER — BUPIVACAINE HYDROCHLORIDE AND EPINEPHRINE 5; 5 MG/ML; UG/ML
INJECTION, SOLUTION EPIDURAL; INTRACAUDAL; PERINEURAL
Status: DISPENSED
Start: 2023-07-05

## (undated) RX ORDER — METHYLPREDNISOLONE ACETATE 40 MG/ML
INJECTION, SUSPENSION INTRA-ARTICULAR; INTRALESIONAL; INTRAMUSCULAR; SOFT TISSUE
Status: DISPENSED
Start: 2023-07-05

## (undated) RX ORDER — CEFAZOLIN SODIUM/WATER 2 G/20 ML
SYRINGE (ML) INTRAVENOUS
Status: DISPENSED
Start: 2022-03-08

## (undated) RX ORDER — BUPIVACAINE HYDROCHLORIDE 2.5 MG/ML
INJECTION, SOLUTION EPIDURAL; INFILTRATION; INTRACAUDAL
Status: DISPENSED
Start: 2022-03-08

## (undated) RX ORDER — HYDROMORPHONE HYDROCHLORIDE 1 MG/ML
INJECTION, SOLUTION INTRAMUSCULAR; INTRAVENOUS; SUBCUTANEOUS
Status: DISPENSED
Start: 2023-07-05

## (undated) RX ORDER — HYDROMORPHONE HYDROCHLORIDE 1 MG/ML
INJECTION, SOLUTION INTRAMUSCULAR; INTRAVENOUS; SUBCUTANEOUS
Status: DISPENSED
Start: 2022-03-08

## (undated) RX ORDER — ALBUMIN, HUMAN INJ 5% 5 %
SOLUTION INTRAVENOUS
Status: DISPENSED
Start: 2022-03-08

## (undated) RX ORDER — FENTANYL CITRATE 0.05 MG/ML
INJECTION, SOLUTION INTRAMUSCULAR; INTRAVENOUS
Status: DISPENSED
Start: 2022-03-08

## (undated) RX ORDER — GLYCOPYRROLATE 0.2 MG/ML
INJECTION, SOLUTION INTRAMUSCULAR; INTRAVENOUS
Status: DISPENSED
Start: 2022-03-08

## (undated) RX ORDER — EPHEDRINE SULFATE 50 MG/ML
INJECTION, SOLUTION INTRAMUSCULAR; INTRAVENOUS; SUBCUTANEOUS
Status: DISPENSED
Start: 2023-07-05